# Patient Record
Sex: FEMALE | Race: BLACK OR AFRICAN AMERICAN | NOT HISPANIC OR LATINO | Employment: UNEMPLOYED | URBAN - METROPOLITAN AREA
[De-identification: names, ages, dates, MRNs, and addresses within clinical notes are randomized per-mention and may not be internally consistent; named-entity substitution may affect disease eponyms.]

---

## 2017-05-26 RX ORDER — HYDROXYCHLOROQUINE SULFATE 200 MG/1
200 TABLET, FILM COATED ORAL 2 TIMES DAILY
Qty: 60 TABLET | Refills: 11 | Status: SHIPPED | OUTPATIENT
Start: 2017-05-26 | End: 2017-12-04 | Stop reason: SDUPTHER

## 2017-09-06 DIAGNOSIS — M81.0 OSTEOPOROSIS, UNSPECIFIED OSTEOPOROSIS TYPE, UNSPECIFIED PATHOLOGICAL FRACTURE PRESENCE: ICD-10-CM

## 2017-09-06 DIAGNOSIS — M16.0 PRIMARY OSTEOARTHRITIS OF BOTH HIPS: ICD-10-CM

## 2017-09-06 DIAGNOSIS — M06.9 RHEUMATOID ARTHRITIS OF HAND, UNSPECIFIED LATERALITY, UNSPECIFIED RHEUMATOID FACTOR PRESENCE: ICD-10-CM

## 2017-09-06 RX ORDER — IBUPROFEN AND FAMOTIDINE 26.6; 8 MG/1; MG/1
1 TABLET ORAL 3 TIMES DAILY
Qty: 90 TABLET | Refills: 11 | Status: SHIPPED | OUTPATIENT
Start: 2017-09-06 | End: 2018-04-20 | Stop reason: SDUPTHER

## 2017-09-22 ENCOUNTER — OFFICE VISIT (OUTPATIENT)
Dept: RHEUMATOLOGY | Facility: CLINIC | Age: 62
End: 2017-09-22
Payer: COMMERCIAL

## 2017-09-22 VITALS
SYSTOLIC BLOOD PRESSURE: 115 MMHG | WEIGHT: 234.56 LBS | DIASTOLIC BLOOD PRESSURE: 70 MMHG | BODY MASS INDEX: 41.55 KG/M2 | HEART RATE: 69 BPM

## 2017-09-22 DIAGNOSIS — M16.0 PRIMARY OSTEOARTHRITIS OF BOTH HIPS: ICD-10-CM

## 2017-09-22 DIAGNOSIS — M81.0 OSTEOPOROSIS, UNSPECIFIED OSTEOPOROSIS TYPE, UNSPECIFIED PATHOLOGICAL FRACTURE PRESENCE: ICD-10-CM

## 2017-09-22 DIAGNOSIS — M06.9 RHEUMATOID ARTHRITIS FLARE: ICD-10-CM

## 2017-09-22 DIAGNOSIS — K21.00 GASTROESOPHAGEAL REFLUX DISEASE WITH ESOPHAGITIS: ICD-10-CM

## 2017-09-22 PROCEDURE — 99214 OFFICE O/P EST MOD 30 MIN: CPT | Mod: S$GLB,,, | Performed by: INTERNAL MEDICINE

## 2017-09-22 PROCEDURE — 3008F BODY MASS INDEX DOCD: CPT | Mod: S$GLB,,, | Performed by: INTERNAL MEDICINE

## 2017-09-22 PROCEDURE — 99999 PR PBB SHADOW E&M-EST. PATIENT-LVL III: CPT | Mod: PBBFAC,,, | Performed by: INTERNAL MEDICINE

## 2017-09-22 RX ORDER — CHOLECALCIFEROL (VITAMIN D3) 25 MCG
2000 TABLET ORAL DAILY
COMMUNITY

## 2017-09-22 RX ORDER — DEXLANSOPRAZOLE 60 MG/1
60 CAPSULE, DELAYED RELEASE ORAL DAILY
Qty: 30 CAPSULE | Refills: 11 | Status: SHIPPED | OUTPATIENT
Start: 2017-09-22 | End: 2018-04-20 | Stop reason: SDUPTHER

## 2017-09-22 RX ORDER — PREDNISONE 5 MG/1
TABLET ORAL
Qty: 90 TABLET | Refills: 6 | Status: SHIPPED | OUTPATIENT
Start: 2017-09-22 | End: 2018-04-20 | Stop reason: SDUPTHER

## 2017-09-22 RX ORDER — CLOTRIMAZOLE AND BETAMETHASONE DIPROPIONATE 10; .64 MG/G; MG/G
CREAM TOPICAL 2 TIMES DAILY
Qty: 45 G | Refills: 3 | Status: SHIPPED | OUTPATIENT
Start: 2017-09-22 | End: 2019-01-14 | Stop reason: SDUPTHER

## 2017-09-22 RX ORDER — AMLODIPINE BESYLATE 5 MG/1
5 TABLET ORAL DAILY
COMMUNITY
End: 2018-04-20 | Stop reason: SDUPTHER

## 2017-09-22 NOTE — PROGRESS NOTES
Subjective:       Patient ID: Sobeida Valencia is a 62 y.o. female.    Chief Complaint: Follow-up      Follow up   Rheumatoid arthritis   Patient complains of arthralgias and myalgias for which has been present for a few years. Pain is located in multiple joints, both shoulder(s), both elbow(s), both wrist(s), both MCP(s): 1st, 2nd, 3rd, 4th and 5th, both PIP(s): 1st, 2nd, 3rd, 4th and 5th, both DIP(s): 1st and 2nd, both hip(s), both knee(s) and both MTP(s): 1st, 2nd, 3rd, 4th and 5th, is described as aching, pulsating, shooting and throbbing, and is constant, moderate .  Associated symptoms include: crepitation, decreased range of motion, edema, effusion, tenderness and warmth.  The patient is on plaquenil but not taking it routinely and duxesis. .          Review of Systems   Constitutional: Positive for activity change. Negative for appetite change, chills, diaphoresis and unexpected weight change.   HENT: Negative for congestion, dental problem, ear discharge, ear pain, facial swelling, mouth sores, nosebleeds, postnasal drip, rhinorrhea, sinus pressure, sneezing, sore throat, tinnitus and voice change.    Eyes: Negative for photophobia, pain, discharge, redness and itching.   Respiratory: Negative for apnea, cough, chest tightness, shortness of breath and wheezing.    Cardiovascular: Positive for leg swelling. Negative for chest pain and palpitations.   Gastrointestinal: Negative for abdominal distention, abdominal pain, constipation, diarrhea, nausea and vomiting.   Endocrine: Negative for cold intolerance, heat intolerance, polydipsia and polyuria.   Genitourinary: Negative for decreased urine volume, difficulty urinating, flank pain, frequency, hematuria and urgency.   Musculoskeletal: Positive for arthralgias, back pain, gait problem, neck pain and neck stiffness.   Skin: Negative for pallor, rash and wound.   Allergic/Immunologic: Negative for immunocompromised state.   Neurological: Negative for dizziness,  tremors, weakness and numbness.   Hematological: Negative for adenopathy. Does not bruise/bleed easily.   Psychiatric/Behavioral: Negative for sleep disturbance. The patient is not nervous/anxious.          Objective:     /70 (BP Location: Left arm, Patient Position: Sitting, BP Method: Large (Automatic))   Pulse 69   Wt 106.4 kg (234 lb 9.1 oz)   BMI 41.55 kg/m²      Physical Exam   Nursing note and vitals reviewed.  Constitutional: She is oriented to person, place, and time and well-developed, well-nourished, and in no distress.   HENT:   Head: Normocephalic and atraumatic.   Mouth/Throat: Oropharynx is clear and moist.   Eyes: EOM are normal. Pupils are equal, round, and reactive to light.   Neck: Neck supple. No thyromegaly present.   Cardiovascular: Normal rate, regular rhythm and normal heart sounds.  Exam reveals no gallop and no friction rub.    No murmur heard.  Pulmonary/Chest: Breath sounds normal. She has no wheezes. She has no rales. She exhibits no tenderness.   Abdominal: There is no tenderness. There is no rebound and no guarding.       Right Side Rheumatological Exam     Examination finds the elbow normal.    The patient is tender to palpation of the 1st PIP, 1st MCP, 2nd PIP, 2nd MCP, 3rd PIP, 3rd MCP, 4th PIP, 4th MCP, 5th PIP and 5th MCP    She has swelling of the 1st PIP, 1st MCP, 2nd PIP, 2nd MCP, 3rd PIP, 3rd MCP, 4th PIP, 4th MCP, 5th PIP and 5th MCP    Shoulder Exam   Tenderness Location: no tenderness    Range of Motion   Active Abduction: abnormal   Adduction: abnormal  Sensation: normal    Knee Exam   Patellofemoral Crepitus: positive  Effusion: negative  Sensation: normal    Hip Exam   Tenderness Location: posterior  Sensation: normal    Elbow/Wrist Exam   Tenderness Location: no tenderness  Sensation: normal    Left Side Rheumatological Exam     Examination finds the elbow normal.    The patient is tender to palpation of the 1st PIP, 1st MCP, 2nd PIP, 2nd MCP, 3rd PIP, 3rd MCP,  4th PIP, 4th MCP, 5th PIP and 5th MCP.    She has swelling of the 1st PIP, 1st MCP, 2nd PIP, 2nd MCP, 3rd PIP, 3rd MCP, 4th PIP, 4th MCP, 5th PIP and 5th MCP    Shoulder Exam   Tenderness Location: no tenderness    Range of Motion   Active Abduction: abnormal   Sensation: normal    Knee Exam     Patellofemoral Crepitus: positive  Effusion: negative  Sensation: normal    Hip Exam   Tenderness Location: posterior  Sensation: normal    Elbow/Wrist Exam   Sensation: normal      Back/Neck Exam   General Inspection   Gait: normal         Lymphadenopathy:     She has no cervical adenopathy.   Neurological: She is alert and oriented to person, place, and time. Gait normal.   Skin: No rash noted. No erythema. No pallor.     Psychiatric: Mood and affect normal.   Musculoskeletal: She exhibits edema and tenderness.          Assessment:     Plan:     Subjective:         Review of Systems   Constitutional: Positive for fatigue. Negative for fever, chills, weight loss, diaphoresis, activity change, appetite change and unexpected weight change.   HENT: Negative for congestion, dental problem, ear discharge, ear pain, facial swelling, mouth sores, nosebleeds, postnasal drip, rhinorrhea, sinus pressure, sneezing, sore throat, tinnitus, trouble swallowing and voice change.    Eyes: Negative for photophobia, pain, discharge, redness and itching.   Respiratory: Negative for apnea, cough, chest tightness, shortness of breath and wheezing.    Cardiovascular: Positive for leg swelling. Negative for chest pain and palpitations.   Gastrointestinal: Negative for nausea, vomiting, abdominal pain, diarrhea, constipation and abdominal distention.   Endocrine: Negative for cold intolerance, heat intolerance, polydipsia and polyuria.   Genitourinary: Negative for dysuria, urgency, frequency, hematuria, flank pain, decreased urine volume and difficulty urinating.   Musculoskeletal: Positive for myalgias, back pain, joint swelling, arthralgias,  gait problem, stiffness, neck pain and neck stiffness.   Skin: Negative for pallor, rash and wound.   Allergic/Immunologic: Negative for immunocompromised state.   Neurological: Negative for dizziness, tremors, weakness, numbness and headaches.   Hematological: Negative for adenopathy. Does not bruise/bleed easily.   Psychiatric/Behavioral: Negative for sleep disturbance. The patient is not nervous/anxious.          Objective:     /70 (BP Location: Left arm, Patient Position: Sitting, BP Method: Large (Automatic))   Pulse 69   Wt 106.4 kg (234 lb 9.1 oz)   BMI 41.55 kg/m²      Physical Exam   Nursing note and vitals reviewed.  Constitutional: She is oriented to person, place, and time and well-developed, well-nourished, and in no distress.   HENT:   Head: Normocephalic and atraumatic.   Mouth/Throat: Oropharynx is clear and moist.   Eyes: EOM are normal. Pupils are equal, round, and reactive to light.   Neck: Neck supple. No thyromegaly present.   Cardiovascular: Normal rate, regular rhythm and normal heart sounds.  Exam reveals no gallop and no friction rub.    No murmur heard.  Pulmonary/Chest: Breath sounds normal. She has no wheezes. She has no rales. She exhibits no tenderness.   Abdominal: There is no tenderness. There is no rebound and no guarding.       Right Side Rheumatological Exam     Examination finds the elbow normal.    The patient is tender to palpation of the shoulder, wrist, knee, 1st PIP, 1st MCP, 2nd PIP, 2nd MCP, 3rd PIP, 3rd MCP, 4th PIP, 4th MCP, 5th PIP and 5th MCP    She has swelling of the 1st PIP, 1st MCP, 2nd PIP, 2nd MCP, 3rd PIP, 3rd MCP, 4th PIP, 4th MCP, 5th PIP and 5th MCP    Shoulder Exam   Tenderness Location: no tenderness    Range of Motion   Active Abduction: abnormal   Adduction: abnormal  Effusion: negative  Sensation: normal    Knee Exam   Patellofemoral Crepitus: positive  Effusion: negative  Range of Motion: normal range of motion  Sensation: normal    Hip Exam    Tenderness Location: posterior  Sensation: normal    Elbow/Wrist Exam   Tenderness Location: no tenderness  Sensation: normal    Left Side Rheumatological Exam     Examination finds the elbow normal.    The patient is tender to palpation of the shoulder, wrist, knee, 1st PIP, 1st MCP, 2nd PIP, 2nd MCP, 3rd PIP, 3rd MCP, 4th PIP, 4th MCP, 5th PIP and 5th MCP.    She has swelling of the 1st PIP, 1st MCP, 2nd PIP, 2nd MCP, 3rd PIP, 3rd MCP, 4th PIP, 4th MCP, 5th PIP and 5th MCP    Shoulder Exam   Tenderness Location: no tenderness    Range of Motion   Active Abduction: abnormal   Sensation: normal    Knee Exam     Patellofemoral Crepitus: positive  Effusion: negative  Range of Motion: decreased range of motion  Sensation: normal    Hip Exam   Tenderness Location: posterior  Sensation: normal    Elbow/Wrist Exam   Sensation: normal      Back/Neck Exam   General Inspection   Gait: normal         Lymphadenopathy:     She has no cervical adenopathy.   Neurological: She is alert and oriented to person, place, and time. Gait normal.   Skin: No rash noted. No erythema. No pallor.     Psychiatric: Mood and affect normal.      ra 12 esr 25   Assessment:       Encounter Diagnoses   Name Primary?    Primary osteoarthritis of both hips     Osteoporosis, unspecified osteoporosis type, unspecified pathological fracture presence     Gastroesophageal reflux disease with esophagitis     Rheumatoid arthritis flare          Plan:     Sobeida was seen today for follow-up.    Diagnoses and all orders for this visit:    Primary osteoarthritis of both hips  -     clotrimazole-betamethasone 1-0.05% (LOTRISONE) cream; Apply topically 2 (two) times daily.  -     dexlansoprazole (DEXILANT) 60 mg capsule; Take 1 capsule (60 mg total) by mouth once daily.  -     predniSONE (DELTASONE) 5 MG tablet; 1 tab to 3 tab po in prn swelling  -     X-Ray Hand Complete Bilateral; Future  -     DEBO; Future  -     Sjogrens syndrome-A extractable  nuclear antibody; Future  -     Sjogrens syndrome-B extractable nuclear antibody; Future  -     TSH; Future  -     T4, free; Future  -     T3, free; Future  -     CBC auto differential; Future  -     Comprehensive metabolic panel; Future  -     Sedimentation rate, manual; Future  -     Rheumatoid factor; Future  -     C-reactive protein; Future  -     Cyclic citrul peptide antibody, IgG; Future  -     X-Ray Hand Complete Bilateral  -     DEBO  -     Sjogrens syndrome-A extractable nuclear antibody  -     Sjogrens syndrome-B extractable nuclear antibody  -     TSH  -     T4, free  -     T3, free  -     CBC auto differential  -     Comprehensive metabolic panel  -     Sedimentation rate, manual  -     Rheumatoid factor  -     C-reactive protein  -     Cyclic citrul peptide antibody, IgG    Osteoporosis, unspecified osteoporosis type, unspecified pathological fracture presence  -     clotrimazole-betamethasone 1-0.05% (LOTRISONE) cream; Apply topically 2 (two) times daily.  -     dexlansoprazole (DEXILANT) 60 mg capsule; Take 1 capsule (60 mg total) by mouth once daily.  -     predniSONE (DELTASONE) 5 MG tablet; 1 tab to 3 tab po in prn swelling  -     X-Ray Hand Complete Bilateral; Future  -     DEBO; Future  -     Sjogrens syndrome-A extractable nuclear antibody; Future  -     Sjogrens syndrome-B extractable nuclear antibody; Future  -     TSH; Future  -     T4, free; Future  -     T3, free; Future  -     CBC auto differential; Future  -     Comprehensive metabolic panel; Future  -     Sedimentation rate, manual; Future  -     Rheumatoid factor; Future  -     C-reactive protein; Future  -     Cyclic citrul peptide antibody, IgG; Future  -     X-Ray Hand Complete Bilateral  -     DEBO  -     Sjogrens syndrome-A extractable nuclear antibody  -     Sjogrens syndrome-B extractable nuclear antibody  -     TSH  -     T4, free  -     T3, free  -     CBC auto differential  -     Comprehensive metabolic panel  -      Sedimentation rate, manual  -     Rheumatoid factor  -     C-reactive protein  -     Cyclic citrul peptide antibody, IgG    Gastroesophageal reflux disease with esophagitis  -     clotrimazole-betamethasone 1-0.05% (LOTRISONE) cream; Apply topically 2 (two) times daily.  -     dexlansoprazole (DEXILANT) 60 mg capsule; Take 1 capsule (60 mg total) by mouth once daily.  -     predniSONE (DELTASONE) 5 MG tablet; 1 tab to 3 tab po in prn swelling  -     X-Ray Hand Complete Bilateral; Future  -     DEBO; Future  -     Sjogrens syndrome-A extractable nuclear antibody; Future  -     Sjogrens syndrome-B extractable nuclear antibody; Future  -     TSH; Future  -     T4, free; Future  -     T3, free; Future  -     CBC auto differential; Future  -     Comprehensive metabolic panel; Future  -     Sedimentation rate, manual; Future  -     Rheumatoid factor; Future  -     C-reactive protein; Future  -     Cyclic citrul peptide antibody, IgG; Future  -     X-Ray Hand Complete Bilateral  -     DEBO  -     Sjogrens syndrome-A extractable nuclear antibody  -     Sjogrens syndrome-B extractable nuclear antibody  -     TSH  -     T4, free  -     T3, free  -     CBC auto differential  -     Comprehensive metabolic panel  -     Sedimentation rate, manual  -     Rheumatoid factor  -     C-reactive protein  -     Cyclic citrul peptide antibody, IgG    Rheumatoid arthritis flare  -     clotrimazole-betamethasone 1-0.05% (LOTRISONE) cream; Apply topically 2 (two) times daily.  -     dexlansoprazole (DEXILANT) 60 mg capsule; Take 1 capsule (60 mg total) by mouth once daily.  -     predniSONE (DELTASONE) 5 MG tablet; 1 tab to 3 tab po in prn swelling  -     X-Ray Hand Complete Bilateral; Future  -     DEBO; Future  -     Sjogrens syndrome-A extractable nuclear antibody; Future  -     Sjogrens syndrome-B extractable nuclear antibody; Future  -     TSH; Future  -     T4, free; Future  -     T3, free; Future  -     CBC auto differential; Future  -      Comprehensive metabolic panel; Future  -     Sedimentation rate, manual; Future  -     Rheumatoid factor; Future  -     C-reactive protein; Future  -     Cyclic citrul peptide antibody, IgG; Future  -     X-Ray Hand Complete Bilateral  -     DEBO  -     Sjogrens syndrome-A extractable nuclear antibody  -     Sjogrens syndrome-B extractable nuclear antibody  -     TSH  -     T4, free  -     T3, free  -     CBC auto differential  -     Comprehensive metabolic panel  -     Sedimentation rate, manual  -     Rheumatoid factor  -     C-reactive protein  -     Cyclic citrul peptide antibody, IgG    Plaquenil for ra slow progression   prednisone only when bad

## 2017-11-14 ENCOUNTER — HISTORICAL (OUTPATIENT)
Dept: ENDOSCOPY | Facility: HOSPITAL | Age: 62
End: 2017-11-14

## 2017-12-05 RX ORDER — HYDROXYCHLOROQUINE SULFATE 200 MG/1
200 TABLET, FILM COATED ORAL 2 TIMES DAILY
Qty: 60 TABLET | Refills: 11 | Status: SHIPPED | OUTPATIENT
Start: 2017-12-05 | End: 2018-04-20 | Stop reason: SDUPTHER

## 2018-04-19 LAB
ALBUMIN SERPL-MCNC: 4 G/DL (ref 3.6–4.8)
ALBUMIN/GLOB SERPL: 1.4 {RATIO} (ref 1.2–2.2)
ALP SERPL-CCNC: 173 IU/L (ref 39–117)
ALT SERPL-CCNC: 13 IU/L (ref 0–32)
ANA TITR SER IF: NEGATIVE {TITER}
AST SERPL-CCNC: 16 IU/L (ref 0–40)
BASOPHILS # BLD AUTO: 0.1 X10E3/UL (ref 0–0.2)
BASOPHILS NFR BLD AUTO: 1 %
BILIRUB SERPL-MCNC: 0.3 MG/DL (ref 0–1.2)
BUN SERPL-MCNC: 14 MG/DL (ref 8–27)
BUN/CREAT SERPL: 18 (ref 12–28)
CALCIUM SERPL-MCNC: 9.4 MG/DL (ref 8.7–10.3)
CCP IGA+IGG SERPL IA-ACNC: 6 UNITS (ref 0–19)
CHLORIDE SERPL-SCNC: 104 MMOL/L (ref 96–106)
CO2 SERPL-SCNC: 24 MMOL/L (ref 18–29)
CREAT SERPL-MCNC: 0.8 MG/DL (ref 0.57–1)
CRP SERPL-MCNC: 9.4 MG/L (ref 0–4.9)
ENA SS-A AB SER-ACNC: 0.2 AI (ref 0–0.9)
ENA SS-B AB SER-ACNC: <0.2 AI (ref 0–0.9)
EOSINOPHIL # BLD AUTO: 0.6 X10E3/UL (ref 0–0.4)
EOSINOPHIL NFR BLD AUTO: 6 %
ERYTHROCYTE [DISTWIDTH] IN BLOOD BY AUTOMATED COUNT: 14.3 % (ref 12.3–15.4)
ERYTHROCYTE [SEDIMENTATION RATE] IN BLOOD BY WESTERGREN METHOD: 27 MM/HR (ref 0–40)
GFR SERPLBLD CREATININE-BSD FMLA CKD-EPI: 79 ML/MIN/1.73
GFR SERPLBLD CREATININE-BSD FMLA CKD-EPI: 91 ML/MIN/1.73
GLOBULIN SER CALC-MCNC: 2.8 G/DL (ref 1.5–4.5)
GLUCOSE SERPL-MCNC: 92 MG/DL (ref 65–99)
HCT VFR BLD AUTO: 42.1 % (ref 34–46.6)
HGB BLD-MCNC: 13.2 G/DL (ref 11.1–15.9)
LYMPHOCYTES # BLD AUTO: 2 X10E3/UL (ref 0.7–3.1)
LYMPHOCYTES NFR BLD AUTO: 20 %
MCH RBC QN AUTO: 29.6 PG (ref 26.6–33)
MCHC RBC AUTO-ENTMCNC: 31.5 G/DL (ref 31.5–35.7)
MCV RBC AUTO: 94 FL (ref 79–97)
MONOCYTES # BLD AUTO: 0.7 X10E3/UL (ref 0.1–0.9)
MONOCYTES NFR BLD AUTO: 8 %
NEUTROPHILS # BLD AUTO: 6.1 X10E3/UL (ref 1.4–7)
NEUTROPHILS NFR BLD AUTO: 65 %
PLATELET # BLD AUTO: 254 X10E3/UL (ref 150–379)
POTASSIUM SERPL-SCNC: 3.8 MMOL/L (ref 3.5–5.2)
PROT SERPL-MCNC: 6.8 G/DL (ref 6–8.5)
RBC # BLD AUTO: 4.47 X10E6/UL (ref 3.77–5.28)
RHEUMATOID FACT SERPL-ACNC: 14.1 IU/ML (ref 0–13.9)
SODIUM SERPL-SCNC: 142 MMOL/L (ref 134–144)
T3FREE SERPL-MCNC: 3.2 PG/ML (ref 2–4.4)
T4 FREE SERPL-MCNC: 1.22 NG/DL (ref 0.82–1.77)
TSH SERPL DL<=0.005 MIU/L-ACNC: 2.07 UIU/ML (ref 0.45–4.5)
WBC # BLD AUTO: 9.7 X10E3/UL (ref 3.4–10.8)

## 2018-04-20 ENCOUNTER — OFFICE VISIT (OUTPATIENT)
Dept: RHEUMATOLOGY | Facility: CLINIC | Age: 63
End: 2018-04-20
Payer: COMMERCIAL

## 2018-04-20 VITALS
HEIGHT: 63 IN | DIASTOLIC BLOOD PRESSURE: 71 MMHG | BODY MASS INDEX: 41.21 KG/M2 | WEIGHT: 232.56 LBS | SYSTOLIC BLOOD PRESSURE: 141 MMHG | HEART RATE: 70 BPM

## 2018-04-20 DIAGNOSIS — M06.9 RHEUMATOID ARTHRITIS FLARE: ICD-10-CM

## 2018-04-20 DIAGNOSIS — K21.00 GASTROESOPHAGEAL REFLUX DISEASE WITH ESOPHAGITIS: ICD-10-CM

## 2018-04-20 DIAGNOSIS — M16.0 PRIMARY OSTEOARTHRITIS OF BOTH HIPS: ICD-10-CM

## 2018-04-20 DIAGNOSIS — M81.0 OSTEOPOROSIS, UNSPECIFIED OSTEOPOROSIS TYPE, UNSPECIFIED PATHOLOGICAL FRACTURE PRESENCE: ICD-10-CM

## 2018-04-20 DIAGNOSIS — M06.9 RHEUMATOID ARTHRITIS OF HAND, UNSPECIFIED LATERALITY, UNSPECIFIED RHEUMATOID FACTOR PRESENCE: ICD-10-CM

## 2018-04-20 PROCEDURE — 99999 PR PBB SHADOW E&M-EST. PATIENT-LVL III: CPT | Mod: PBBFAC,,, | Performed by: INTERNAL MEDICINE

## 2018-04-20 PROCEDURE — 99214 OFFICE O/P EST MOD 30 MIN: CPT | Mod: S$GLB,,, | Performed by: INTERNAL MEDICINE

## 2018-04-20 RX ORDER — AMLODIPINE BESYLATE 5 MG/1
5 TABLET ORAL DAILY
Qty: 30 TABLET | Refills: 11 | Status: SHIPPED | OUTPATIENT
Start: 2018-04-20 | End: 2019-07-08 | Stop reason: SDUPTHER

## 2018-04-20 RX ORDER — PREDNISONE 5 MG/1
TABLET ORAL
Qty: 90 TABLET | Refills: 6 | Status: SHIPPED | OUTPATIENT
Start: 2018-04-20 | End: 2018-10-26 | Stop reason: SDUPTHER

## 2018-04-20 RX ORDER — DEXLANSOPRAZOLE 60 MG/1
60 CAPSULE, DELAYED RELEASE ORAL DAILY
Qty: 30 CAPSULE | Refills: 11 | Status: SHIPPED | OUTPATIENT
Start: 2018-04-20 | End: 2019-07-08 | Stop reason: SDUPTHER

## 2018-04-20 RX ORDER — IBUPROFEN AND FAMOTIDINE 26.6; 8 MG/1; MG/1
1 TABLET ORAL 3 TIMES DAILY
Qty: 90 TABLET | Refills: 11 | Status: SHIPPED | OUTPATIENT
Start: 2018-04-20 | End: 2018-07-13 | Stop reason: SDUPTHER

## 2018-04-20 RX ORDER — HYDROXYCHLOROQUINE SULFATE 200 MG/1
200 TABLET, FILM COATED ORAL 2 TIMES DAILY
Qty: 60 TABLET | Refills: 11 | Status: SHIPPED | OUTPATIENT
Start: 2018-04-20 | End: 2018-10-26 | Stop reason: SDUPTHER

## 2018-04-20 ASSESSMENT — ROUTINE ASSESSMENT OF PATIENT INDEX DATA (RAPID3)
MDHAQ FUNCTION SCORE: 1
TOTAL RAPID3 SCORE: 4.44
PSYCHOLOGICAL DISTRESS SCORE: 2.2
PATIENT GLOBAL ASSESSMENT SCORE: 5
AM STIFFNESS SCORE: 1, YES
PAIN SCORE: 5
FATIGUE SCORE: 4

## 2018-04-20 NOTE — PROGRESS NOTES
Subjective:       Patient ID: Sobeida Valencia is a 63 y.o. female.    Chief Complaint: Disease Management (6 mth f/u for OA/ RA) and Pain      Follow up   Rheumatoid arthritis   Patient complains of arthralgias and myalgias for which has been present for a few years. Pain is located in multiple joints, both shoulder(s), both elbow(s), both wrist(s), both MCP(s): 1st, 2nd, 3rd, 4th and 5th, both PIP(s): 1st, 2nd, 3rd, 4th and 5th, both DIP(s): 1st and 2nd, both hip(s), both knee(s) and both MTP(s): 1st, 2nd, 3rd, 4th and 5th, is described as aching, pulsating, shooting and throbbing, and is constant, moderate .  Associated symptoms include: crepitation, decreased range of motion, edema, effusion, tenderness and warmth.  The patient is on plaquenil but not taking it routinely and duxesis. .          Review of Systems   Constitutional: Positive for activity change. Negative for appetite change, chills, diaphoresis and unexpected weight change.   HENT: Negative for congestion, dental problem, ear discharge, ear pain, facial swelling, mouth sores, nosebleeds, postnasal drip, rhinorrhea, sinus pressure, sneezing, sore throat, tinnitus and voice change.    Eyes: Negative for photophobia, pain, discharge, redness and itching.   Respiratory: Negative for apnea, cough, chest tightness, shortness of breath and wheezing.    Cardiovascular: Positive for leg swelling. Negative for chest pain and palpitations.   Gastrointestinal: Negative for abdominal distention, abdominal pain, constipation, diarrhea, nausea and vomiting.   Endocrine: Negative for cold intolerance, heat intolerance, polydipsia and polyuria.   Genitourinary: Negative for decreased urine volume, difficulty urinating, flank pain, frequency, hematuria and urgency.   Musculoskeletal: Positive for arthralgias, back pain, gait problem, neck pain and neck stiffness.   Skin: Negative for pallor, rash and wound.   Allergic/Immunologic: Negative for immunocompromised  "state.   Neurological: Negative for dizziness, tremors, weakness and numbness.   Hematological: Negative for adenopathy. Does not bruise/bleed easily.   Psychiatric/Behavioral: Negative for sleep disturbance. The patient is not nervous/anxious.          Objective:     BP (!) 141/71 (BP Location: Left leg, Patient Position: Sitting, BP Method: Large (Automatic))   Pulse 70   Ht 5' 3" (1.6 m)   Wt 105.5 kg (232 lb 9.4 oz)   BMI 41.20 kg/m²      Physical Exam   Nursing note and vitals reviewed.  Constitutional: She is oriented to person, place, and time and well-developed, well-nourished, and in no distress.   HENT:   Head: Normocephalic and atraumatic.   Mouth/Throat: Oropharynx is clear and moist.   Eyes: EOM are normal. Pupils are equal, round, and reactive to light.   Neck: Neck supple. No thyromegaly present.   Cardiovascular: Normal rate, regular rhythm and normal heart sounds.  Exam reveals no gallop and no friction rub.    No murmur heard.  Pulmonary/Chest: Breath sounds normal. She has no wheezes. She has no rales. She exhibits no tenderness.   Abdominal: There is no tenderness. There is no rebound and no guarding.       Right Side Rheumatological Exam     Examination finds the elbow normal.    The patient is tender to palpation of the 1st PIP, 1st MCP, 2nd PIP, 2nd MCP, 3rd PIP, 3rd MCP, 4th PIP, 4th MCP, 5th PIP and 5th MCP    She has swelling of the 1st PIP, 1st MCP, 2nd PIP, 2nd MCP, 3rd PIP, 3rd MCP, 4th PIP, 4th MCP, 5th PIP and 5th MCP    Shoulder Exam   Tenderness Location: no tenderness    Range of Motion   Active Abduction: abnormal   Adduction: abnormal  Sensation: normal    Knee Exam   Patellofemoral Crepitus: positive  Effusion: negative  Sensation: normal    Hip Exam   Tenderness Location: posterior  Sensation: normal    Elbow/Wrist Exam   Tenderness Location: no tenderness  Sensation: normal    Left Side Rheumatological Exam     Examination finds the elbow normal.    The patient is tender to " palpation of the 1st PIP, 1st MCP, 2nd PIP, 2nd MCP, 3rd PIP, 3rd MCP, 4th PIP, 4th MCP, 5th PIP and 5th MCP.    She has swelling of the 1st PIP, 1st MCP, 2nd PIP, 2nd MCP, 3rd PIP, 3rd MCP, 4th PIP, 4th MCP, 5th PIP and 5th MCP    Shoulder Exam   Tenderness Location: no tenderness    Range of Motion   Active Abduction: abnormal   Sensation: normal    Knee Exam     Patellofemoral Crepitus: positive  Effusion: negative  Sensation: normal    Hip Exam   Tenderness Location: posterior  Sensation: normal    Elbow/Wrist Exam   Sensation: normal      Back/Neck Exam   General Inspection   Gait: normal         Lymphadenopathy:     She has no cervical adenopathy.   Neurological: She is alert and oriented to person, place, and time. Gait normal.   Skin: No rash noted. No erythema. No pallor.     Psychiatric: Mood and affect normal.   Musculoskeletal: She exhibits edema and tenderness.                  Assessment:       Encounter Diagnoses   Name Primary?    Rheumatoid arthritis flare     Primary osteoarthritis of both hips     Osteoporosis, unspecified osteoporosis type, unspecified pathological fracture presence     Gastroesophageal reflux disease with esophagitis     Rheumatoid arthritis of hand, unspecified laterality, unspecified rheumatoid factor presence          Plan:     Sobeida was seen today for disease management and pain.    Diagnoses and all orders for this visit:    Rheumatoid arthritis flare  -     predniSONE (DELTASONE) 5 MG tablet; 1 tab to 3 tab po in prn swelling  -     dexlansoprazole (DEXILANT) 60 mg capsule; Take 1 capsule (60 mg total) by mouth once daily.  -     CBC auto differential; Future  -     Comprehensive metabolic panel; Future  -     C-reactive protein; Future  -     Sedimentation rate, manual; Future  -     Rheumatoid factor; Future  -     Cyclic citrul peptide antibody, IgG; Future  -     Vitamin D; Future  -     TSH; Future  -     CBC auto differential  -     Comprehensive metabolic  panel  -     C-reactive protein  -     Sedimentation rate, manual  -     Rheumatoid factor  -     Cyclic citrul peptide antibody, IgG  -     Vitamin D  -     TSH    Primary osteoarthritis of both hips  -     predniSONE (DELTASONE) 5 MG tablet; 1 tab to 3 tab po in prn swelling  -     ibuprofen-famotidine (DUEXIS) 800-26.6 mg Tab; Take 1 tablet by mouth 3 (three) times daily.  -     dexlansoprazole (DEXILANT) 60 mg capsule; Take 1 capsule (60 mg total) by mouth once daily.  -     CBC auto differential; Future  -     Comprehensive metabolic panel; Future  -     C-reactive protein; Future  -     Sedimentation rate, manual; Future  -     Rheumatoid factor; Future  -     Cyclic citrul peptide antibody, IgG; Future  -     Vitamin D; Future  -     TSH; Future  -     CBC auto differential  -     Comprehensive metabolic panel  -     C-reactive protein  -     Sedimentation rate, manual  -     Rheumatoid factor  -     Cyclic citrul peptide antibody, IgG  -     Vitamin D  -     TSH    Osteoporosis, unspecified osteoporosis type, unspecified pathological fracture presence  -     predniSONE (DELTASONE) 5 MG tablet; 1 tab to 3 tab po in prn swelling  -     ibuprofen-famotidine (DUEXIS) 800-26.6 mg Tab; Take 1 tablet by mouth 3 (three) times daily.  -     dexlansoprazole (DEXILANT) 60 mg capsule; Take 1 capsule (60 mg total) by mouth once daily.  -     CBC auto differential; Future  -     Comprehensive metabolic panel; Future  -     C-reactive protein; Future  -     Sedimentation rate, manual; Future  -     Rheumatoid factor; Future  -     Cyclic citrul peptide antibody, IgG; Future  -     Vitamin D; Future  -     TSH; Future  -     CBC auto differential  -     Comprehensive metabolic panel  -     C-reactive protein  -     Sedimentation rate, manual  -     Rheumatoid factor  -     Cyclic citrul peptide antibody, IgG  -     Vitamin D  -     TSH    Gastroesophageal reflux disease with esophagitis  -     predniSONE (DELTASONE) 5 MG  tablet; 1 tab to 3 tab po in prn swelling  -     dexlansoprazole (DEXILANT) 60 mg capsule; Take 1 capsule (60 mg total) by mouth once daily.  -     CBC auto differential; Future  -     Comprehensive metabolic panel; Future  -     C-reactive protein; Future  -     Sedimentation rate, manual; Future  -     Rheumatoid factor; Future  -     Cyclic citrul peptide antibody, IgG; Future  -     Vitamin D; Future  -     TSH; Future  -     CBC auto differential  -     Comprehensive metabolic panel  -     C-reactive protein  -     Sedimentation rate, manual  -     Rheumatoid factor  -     Cyclic citrul peptide antibody, IgG  -     Vitamin D  -     TSH    Rheumatoid arthritis of hand, unspecified laterality, unspecified rheumatoid factor presence  -     ibuprofen-famotidine (DUEXIS) 800-26.6 mg Tab; Take 1 tablet by mouth 3 (three) times daily.  -     CBC auto differential; Future  -     Comprehensive metabolic panel; Future  -     C-reactive protein; Future  -     Sedimentation rate, manual; Future  -     Rheumatoid factor; Future  -     Cyclic citrul peptide antibody, IgG; Future  -     Vitamin D; Future  -     TSH; Future  -     CBC auto differential  -     Comprehensive metabolic panel  -     C-reactive protein  -     Sedimentation rate, manual  -     Rheumatoid factor  -     Cyclic citrul peptide antibody, IgG  -     Vitamin D  -     TSH    Other orders  -     hydroxychloroquine (PLAQUENIL) 200 mg tablet; Take 1 tablet (200 mg total) by mouth 2 (two) times daily.  -     amLODIPine (NORVASC) 5 MG tablet; Take 1 tablet (5 mg total) by mouth once daily.    Plaquenil for ra slow progression   prednisone only when bad

## 2018-05-03 ENCOUNTER — TELEPHONE (OUTPATIENT)
Dept: RHEUMATOLOGY | Facility: CLINIC | Age: 63
End: 2018-05-03

## 2018-05-03 NOTE — TELEPHONE ENCOUNTER
----- Message from Clinton Pulido MD sent at 4/29/2018  7:12 PM CDT -----  Your results are essential normal. Except RA factor and crp are mildly elevated. Please inflrm pt and fax results to her PCP in [place]

## 2018-05-03 NOTE — TELEPHONE ENCOUNTER
Spoke to pt about results. She inquires what else can she do to manage her RA and inflammation? She reports taking the plaquenil as directed, duexis 3 times daily, and prednisone 10mg daily.

## 2018-07-13 DIAGNOSIS — M16.0 PRIMARY OSTEOARTHRITIS OF BOTH HIPS: ICD-10-CM

## 2018-07-13 DIAGNOSIS — M81.0 OSTEOPOROSIS, UNSPECIFIED OSTEOPOROSIS TYPE, UNSPECIFIED PATHOLOGICAL FRACTURE PRESENCE: ICD-10-CM

## 2018-07-13 DIAGNOSIS — M06.9 RHEUMATOID ARTHRITIS OF HAND, UNSPECIFIED LATERALITY, UNSPECIFIED RHEUMATOID FACTOR PRESENCE: ICD-10-CM

## 2018-07-19 RX ORDER — IBUPROFEN AND FAMOTIDINE 26.6; 8 MG/1; MG/1
1 TABLET ORAL 3 TIMES DAILY
Qty: 90 TABLET | Refills: 11 | Status: SHIPPED | OUTPATIENT
Start: 2018-07-19 | End: 2019-07-08 | Stop reason: SDUPTHER

## 2018-10-26 ENCOUNTER — TELEPHONE (OUTPATIENT)
Dept: RHEUMATOLOGY | Facility: CLINIC | Age: 63
End: 2018-10-26

## 2018-10-26 DIAGNOSIS — M16.0 PRIMARY OSTEOARTHRITIS OF BOTH HIPS: ICD-10-CM

## 2018-10-26 DIAGNOSIS — K21.00 GASTROESOPHAGEAL REFLUX DISEASE WITH ESOPHAGITIS: ICD-10-CM

## 2018-10-26 DIAGNOSIS — M06.9 RHEUMATOID ARTHRITIS FLARE: ICD-10-CM

## 2018-10-26 DIAGNOSIS — M81.0 OSTEOPOROSIS, UNSPECIFIED OSTEOPOROSIS TYPE, UNSPECIFIED PATHOLOGICAL FRACTURE PRESENCE: ICD-10-CM

## 2018-10-26 RX ORDER — PREDNISONE 5 MG/1
TABLET ORAL
Qty: 90 TABLET | Refills: 6 | Status: SHIPPED | OUTPATIENT
Start: 2018-10-26 | End: 2019-07-08 | Stop reason: SDUPTHER

## 2018-10-26 RX ORDER — HYDROXYCHLOROQUINE SULFATE 200 MG/1
200 TABLET, FILM COATED ORAL 2 TIMES DAILY
Qty: 60 TABLET | Refills: 11 | Status: SHIPPED | OUTPATIENT
Start: 2018-10-26 | End: 2019-07-08 | Stop reason: SDUPTHER

## 2018-10-26 NOTE — TELEPHONE ENCOUNTER
----- Message from Ed De Jesus sent at 10/26/2018  3:53 PM CDT -----  Type:  Pharmacy Calling to Clarify an RX    Name of Caller:  Ida  Pharmacy Name:    [facility] [place] [id number] [facility]  [address] [place] [address]  [place] [address]  Phone: [phone] Fax: [phone]    Prescription Name:  predniSONE (DELTASONE) 5 MG tablet       What do they need to clarify?:  Directions, 1-3 by mouth or 1, 3XDay  Best Call Back Number:  [phone]   Additional Information:  Please call to advise

## 2019-01-14 DIAGNOSIS — M81.0 OSTEOPOROSIS, UNSPECIFIED OSTEOPOROSIS TYPE, UNSPECIFIED PATHOLOGICAL FRACTURE PRESENCE: ICD-10-CM

## 2019-01-14 DIAGNOSIS — M06.9 RHEUMATOID ARTHRITIS FLARE: ICD-10-CM

## 2019-01-14 DIAGNOSIS — M16.0 PRIMARY OSTEOARTHRITIS OF BOTH HIPS: ICD-10-CM

## 2019-01-14 DIAGNOSIS — K21.00 GASTROESOPHAGEAL REFLUX DISEASE WITH ESOPHAGITIS: ICD-10-CM

## 2019-01-14 DIAGNOSIS — R21 RASH: Primary | ICD-10-CM

## 2019-01-15 RX ORDER — CLOTRIMAZOLE AND BETAMETHASONE DIPROPIONATE 10; .64 MG/G; MG/G
CREAM TOPICAL 2 TIMES DAILY
Qty: 45 G | Refills: 3 | Status: SHIPPED | OUTPATIENT
Start: 2019-01-15 | End: 2019-07-18 | Stop reason: SDUPTHER

## 2019-07-02 ENCOUNTER — TELEPHONE (OUTPATIENT)
Dept: RHEUMATOLOGY | Facility: CLINIC | Age: 64
End: 2019-07-02

## 2019-07-02 NOTE — TELEPHONE ENCOUNTER
----- Message from Sarah Moralez sent at 7/2/2019  9:11 AM CDT -----   Type:  Sooner Apoointment Request    Caller is requesting a sooner appointment.  Caller declined first available appointment listed below.  Caller will not accept being placed on the waitlist and is requesting a message be sent to doctor.    Name of Caller:  pt  When is the first available appointment?   Several  Months  out  Symptoms:   rhemu arthritis   Best Call Back Number:  [phone]  Additional Information:   Pt  Is asking to  Be  Fitted in around the third   Week of this month // please call  For all details

## 2019-07-02 NOTE — TELEPHONE ENCOUNTER
Contacted patient and informed there is appointment available Monday the 8th at 1130 am to see Dr Pulido. Patient stated she could make that appointment. Appointment scheduled

## 2019-07-08 ENCOUNTER — OFFICE VISIT (OUTPATIENT)
Dept: RHEUMATOLOGY | Facility: CLINIC | Age: 64
End: 2019-07-08
Payer: COMMERCIAL

## 2019-07-08 VITALS
BODY MASS INDEX: 36.32 KG/M2 | HEART RATE: 59 BPM | SYSTOLIC BLOOD PRESSURE: 146 MMHG | WEIGHT: 205 LBS | HEIGHT: 63 IN | DIASTOLIC BLOOD PRESSURE: 86 MMHG

## 2019-07-08 DIAGNOSIS — M81.0 OSTEOPOROSIS, UNSPECIFIED OSTEOPOROSIS TYPE, UNSPECIFIED PATHOLOGICAL FRACTURE PRESENCE: ICD-10-CM

## 2019-07-08 DIAGNOSIS — M16.0 PRIMARY OSTEOARTHRITIS OF BOTH HIPS: ICD-10-CM

## 2019-07-08 DIAGNOSIS — M06.9 RHEUMATOID ARTHRITIS FLARE: ICD-10-CM

## 2019-07-08 DIAGNOSIS — M06.9 RHEUMATOID ARTHRITIS OF HAND, UNSPECIFIED LATERALITY, UNSPECIFIED RHEUMATOID FACTOR PRESENCE: ICD-10-CM

## 2019-07-08 DIAGNOSIS — K21.00 GASTROESOPHAGEAL REFLUX DISEASE WITH ESOPHAGITIS: ICD-10-CM

## 2019-07-08 PROCEDURE — 3008F BODY MASS INDEX DOCD: CPT | Mod: CPTII,S$GLB,, | Performed by: INTERNAL MEDICINE

## 2019-07-08 PROCEDURE — 99999 PR PBB SHADOW E&M-EST. PATIENT-LVL III: CPT | Mod: PBBFAC,,, | Performed by: INTERNAL MEDICINE

## 2019-07-08 PROCEDURE — 99999 PR PBB SHADOW E&M-EST. PATIENT-LVL III: ICD-10-PCS | Mod: PBBFAC,,, | Performed by: INTERNAL MEDICINE

## 2019-07-08 PROCEDURE — 3008F PR BODY MASS INDEX (BMI) DOCUMENTED: ICD-10-PCS | Mod: CPTII,S$GLB,, | Performed by: INTERNAL MEDICINE

## 2019-07-08 PROCEDURE — 99214 PR OFFICE/OUTPT VISIT, EST, LEVL IV, 30-39 MIN: ICD-10-PCS | Mod: S$GLB,,, | Performed by: INTERNAL MEDICINE

## 2019-07-08 PROCEDURE — 99214 OFFICE O/P EST MOD 30 MIN: CPT | Mod: S$GLB,,, | Performed by: INTERNAL MEDICINE

## 2019-07-08 RX ORDER — DEXLANSOPRAZOLE 60 MG/1
60 CAPSULE, DELAYED RELEASE ORAL DAILY
Qty: 30 CAPSULE | Refills: 11 | Status: SHIPPED | OUTPATIENT
Start: 2019-07-08 | End: 2019-12-31 | Stop reason: SDUPTHER

## 2019-07-08 RX ORDER — AZELASTINE 1 MG/ML
SPRAY, METERED NASAL
COMMUNITY
Start: 2019-05-01

## 2019-07-08 RX ORDER — HYDROXYCHLOROQUINE SULFATE 200 MG/1
200 TABLET, FILM COATED ORAL 2 TIMES DAILY
Qty: 60 TABLET | Refills: 11 | Status: SHIPPED | OUTPATIENT
Start: 2019-07-08 | End: 2019-12-31 | Stop reason: SDUPTHER

## 2019-07-08 RX ORDER — IBUPROFEN AND FAMOTIDINE 26.6; 8 MG/1; MG/1
1 TABLET ORAL 3 TIMES DAILY
Qty: 90 TABLET | Refills: 11 | Status: SHIPPED | OUTPATIENT
Start: 2019-07-08 | End: 2019-09-04 | Stop reason: SDUPTHER

## 2019-07-08 RX ORDER — MONTELUKAST SODIUM 10 MG/1
TABLET ORAL
COMMUNITY
Start: 2019-05-01 | End: 2023-12-28

## 2019-07-08 RX ORDER — PREDNISONE 5 MG/1
TABLET ORAL
Qty: 90 TABLET | Refills: 6 | Status: SHIPPED | OUTPATIENT
Start: 2019-07-08 | End: 2019-12-31 | Stop reason: SDUPTHER

## 2019-07-08 RX ORDER — RISEDRONATE SODIUM 30.1; 4.9 MG/1; MG/1
1 TABLET, DELAYED RELEASE ORAL
Qty: 4 TABLET | Refills: 6 | Status: SHIPPED | OUTPATIENT
Start: 2019-07-08 | End: 2019-12-31 | Stop reason: SDUPTHER

## 2019-07-08 RX ORDER — AMLODIPINE BESYLATE 5 MG/1
5 TABLET ORAL DAILY
Qty: 30 TABLET | Refills: 11 | Status: SHIPPED | OUTPATIENT
Start: 2019-07-08 | End: 2019-12-31 | Stop reason: SDUPTHER

## 2019-07-08 NOTE — PROGRESS NOTES
Subjective:       Patient ID: Sobeida Valencia is a 64 y.o. female.    Chief Complaint: Rheumatoid Arthritis and Osteoarthritis (of both hips )      Follow up   Rheumatoid arthritis   She had some episode  Falling but improved with anthistimine.  Pain is located in multiple joints, both shoulder(s), both elbow(s), both wrist(s), both MCP(s): 1st, 2nd, 3rd, 4th and 5th, both PIP(s): 1st, 2nd, 3rd, 4th and 5th, both DIP(s): 1st and 2nd, both hip(s), both knee(s) and both MTP(s): 1st, 2nd, 3rd, 4th and 5th, is described as aching, pulsating, shooting and throbbing, and is constant, moderate .  Associated symptoms include: crepitation, decreased range of motion, edema, effusion, tenderness and warmth.  The patient is on plaquenil but not taking it routinely and duxesis. .        Review of Systems   Constitutional: Positive for activity change. Negative for appetite change and unexpected weight change.   HENT: Negative for dental problem, ear discharge, ear pain, facial swelling, mouth sores, nosebleeds, postnasal drip, rhinorrhea, sinus pressure, sneezing, tinnitus and voice change.    Eyes: Negative for photophobia, pain, discharge, redness and itching.   Respiratory: Negative for apnea, chest tightness, shortness of breath and wheezing.    Cardiovascular: Positive for leg swelling. Negative for palpitations.   Gastrointestinal: Negative for abdominal distention, constipation and diarrhea.   Endocrine: Negative for cold intolerance, heat intolerance, polydipsia and polyuria.   Genitourinary: Negative for decreased urine volume, difficulty urinating, flank pain, frequency, hematuria and urgency.   Musculoskeletal: Positive for back pain, gait problem and neck stiffness.   Skin: Negative for pallor and wound.   Allergic/Immunologic: Negative for immunocompromised state.   Neurological: Negative for dizziness and tremors.   Hematological: Negative for adenopathy. Does not bruise/bleed easily.   Psychiatric/Behavioral:  "Negative for sleep disturbance. The patient is not nervous/anxious.          Objective:     BP (!) 146/86 (BP Location: Left arm, Patient Position: Sitting, BP Method: Small (Automatic))   Pulse (!) 59   Ht 5' 3" (1.6 m)   Wt 93 kg (205 lb)   BMI 36.31 kg/m²      Physical Exam   Nursing note and vitals reviewed.  Constitutional: She is oriented to person, place, and time and well-developed, well-nourished, and in no distress.   HENT:   Head: Normocephalic and atraumatic.   Mouth/Throat: Oropharynx is clear and moist.   Eyes: EOM are normal. Pupils are equal, round, and reactive to light.   Neck: Neck supple. No thyromegaly present.   Cardiovascular: Normal rate, regular rhythm and normal heart sounds.  Exam reveals no gallop and no friction rub.    No murmur heard.  Pulmonary/Chest: Breath sounds normal. She has no wheezes. She has no rales. She exhibits no tenderness.   Abdominal: There is no tenderness. There is no rebound and no guarding.       Right Side Rheumatological Exam     Examination finds the elbow normal.    The patient is tender to palpation of the 1st PIP, 1st MCP, 2nd PIP, 2nd MCP, 3rd PIP, 3rd MCP, 4th PIP, 4th MCP, 5th PIP and 5th MCP    She has swelling of the 1st PIP, 1st MCP, 2nd PIP, 2nd MCP, 3rd PIP, 3rd MCP, 4th PIP, 4th MCP, 5th PIP and 5th MCP    Shoulder Exam   Tenderness Location: no tenderness    Range of Motion   Active abduction: abnormal   Adduction: abnormal  Sensation: normal    Knee Exam   Patellofemoral Crepitus: positive  Effusion: negative  Sensation: normal    Hip Exam   Tenderness Location: posterior  Sensation: normal    Elbow/Wrist Exam   Tenderness Location: no tenderness  Sensation: normal    Left Side Rheumatological Exam     Examination finds the elbow normal.    The patient is tender to palpation of the 1st PIP, 1st MCP, 2nd PIP, 2nd MCP, 3rd PIP, 3rd MCP, 4th PIP, 4th MCP, 5th PIP and 5th MCP.    She has swelling of the 1st PIP, 1st MCP, 2nd PIP, 2nd MCP, 3rd PIP, " 3rd MCP, 4th PIP, 4th MCP, 5th PIP and 5th MCP    Shoulder Exam   Tenderness Location: no tenderness    Range of Motion   Active abduction: abnormal   Sensation: normal    Knee Exam     Patellofemoral Crepitus: positive  Effusion: negative  Sensation: normal    Hip Exam   Tenderness Location: posterior  Sensation: normal    Elbow/Wrist Exam   Sensation: normal      Back/Neck Exam   General Inspection   Gait: normal         Lymphadenopathy:     She has no cervical adenopathy.   Neurological: She is alert and oriented to person, place, and time. Gait normal.   Skin: No rash noted. No erythema. No pallor.     Psychiatric: Mood and affect normal.   Musculoskeletal: She exhibits edema and tenderness.                  Assessment:       Encounter Diagnoses   Name Primary?    Rheumatoid arthritis of hand, unspecified laterality, unspecified rheumatoid factor presence     Primary osteoarthritis of both hips     Osteoporosis, unspecified osteoporosis type, unspecified pathological fracture presence     Rheumatoid arthritis flare     Gastroesophageal reflux disease with esophagitis          Plan:     Sobeida was seen today for rheumatoid arthritis and osteoarthritis.    Diagnoses and all orders for this visit:    Rheumatoid arthritis of hand, unspecified laterality, unspecified rheumatoid factor presence  -     amLODIPine (NORVASC) 5 MG tablet; Take 1 tablet (5 mg total) by mouth once daily.  -     ATELVIA 35 mg TbEC; Take 1 tablet (35 mg total) by mouth every 7 days.  -     ibuprofen-famotidine (DUEXIS) 800-26.6 mg Tab; Take 1 tablet by mouth 3 (three) times daily.  -     predniSONE (DELTASONE) 5 MG tablet; 1 tab to 3 tab po in prn swelling  -     hydroxychloroquine (PLAQUENIL) 200 mg tablet; Take 1 tablet (200 mg total) by mouth 2 (two) times daily.  -     CBC auto differential; Future  -     Comprehensive metabolic panel; Future  -     C-reactive protein; Future  -     Sedimentation rate, automated; Future  -      Rheumatoid factor; Future  -     Cyclic citrul peptide antibody, IgG; Future  -     Hemoglobin A1c; Future  -     Lipid panel; Future  -     TSH; Future  -     Vitamin D; Future  -     CBC auto differential  -     Comprehensive metabolic panel  -     C-reactive protein  -     Sedimentation rate, automated  -     Rheumatoid factor  -     Cyclic citrul peptide antibody, IgG  -     Hemoglobin A1c  -     Lipid panel  -     TSH  -     Vitamin D    Primary osteoarthritis of both hips  -     amLODIPine (NORVASC) 5 MG tablet; Take 1 tablet (5 mg total) by mouth once daily.  -     ATELVIA 35 mg TbEC; Take 1 tablet (35 mg total) by mouth every 7 days.  -     ibuprofen-famotidine (DUEXIS) 800-26.6 mg Tab; Take 1 tablet by mouth 3 (three) times daily.  -     predniSONE (DELTASONE) 5 MG tablet; 1 tab to 3 tab po in prn swelling  -     hydroxychloroquine (PLAQUENIL) 200 mg tablet; Take 1 tablet (200 mg total) by mouth 2 (two) times daily.  -     CBC auto differential; Future  -     Comprehensive metabolic panel; Future  -     C-reactive protein; Future  -     Sedimentation rate, automated; Future  -     Rheumatoid factor; Future  -     Cyclic citrul peptide antibody, IgG; Future  -     Hemoglobin A1c; Future  -     Lipid panel; Future  -     TSH; Future  -     Vitamin D; Future  -     CBC auto differential  -     Comprehensive metabolic panel  -     C-reactive protein  -     Sedimentation rate, automated  -     Rheumatoid factor  -     Cyclic citrul peptide antibody, IgG  -     Hemoglobin A1c  -     Lipid panel  -     TSH  -     Vitamin D  -     dexlansoprazole (DEXILANT) 60 mg capsule; Take 1 capsule (60 mg total) by mouth once daily.    Osteoporosis, unspecified osteoporosis type, unspecified pathological fracture presence  -     amLODIPine (NORVASC) 5 MG tablet; Take 1 tablet (5 mg total) by mouth once daily.  -     ATELVIA 35 mg TbEC; Take 1 tablet (35 mg total) by mouth every 7 days.  -     ibuprofen-famotidine (DUEXIS)  800-26.6 mg Tab; Take 1 tablet by mouth 3 (three) times daily.  -     predniSONE (DELTASONE) 5 MG tablet; 1 tab to 3 tab po in prn swelling  -     hydroxychloroquine (PLAQUENIL) 200 mg tablet; Take 1 tablet (200 mg total) by mouth 2 (two) times daily.  -     CBC auto differential; Future  -     Comprehensive metabolic panel; Future  -     C-reactive protein; Future  -     Sedimentation rate, automated; Future  -     Rheumatoid factor; Future  -     Cyclic citrul peptide antibody, IgG; Future  -     Hemoglobin A1c; Future  -     Lipid panel; Future  -     TSH; Future  -     Vitamin D; Future  -     CBC auto differential  -     Comprehensive metabolic panel  -     C-reactive protein  -     Sedimentation rate, automated  -     Rheumatoid factor  -     Cyclic citrul peptide antibody, IgG  -     Hemoglobin A1c  -     Lipid panel  -     TSH  -     Vitamin D  -     dexlansoprazole (DEXILANT) 60 mg capsule; Take 1 capsule (60 mg total) by mouth once daily.    Rheumatoid arthritis flare  -     predniSONE (DELTASONE) 5 MG tablet; 1 tab to 3 tab po in prn swelling  -     hydroxychloroquine (PLAQUENIL) 200 mg tablet; Take 1 tablet (200 mg total) by mouth 2 (two) times daily.  -     CBC auto differential; Future  -     Comprehensive metabolic panel; Future  -     C-reactive protein; Future  -     Sedimentation rate, automated; Future  -     Rheumatoid factor; Future  -     Cyclic citrul peptide antibody, IgG; Future  -     Hemoglobin A1c; Future  -     Lipid panel; Future  -     TSH; Future  -     Vitamin D; Future  -     CBC auto differential  -     Comprehensive metabolic panel  -     C-reactive protein  -     Sedimentation rate, automated  -     Rheumatoid factor  -     Cyclic citrul peptide antibody, IgG  -     Hemoglobin A1c  -     Lipid panel  -     TSH  -     Vitamin D  -     dexlansoprazole (DEXILANT) 60 mg capsule; Take 1 capsule (60 mg total) by mouth once daily.    Gastroesophageal reflux disease with esophagitis  -      amLODIPine (NORVASC) 5 MG tablet; Take 1 tablet (5 mg total) by mouth once daily.  -     ATELVIA 35 mg TbEC; Take 1 tablet (35 mg total) by mouth every 7 days.  -     ibuprofen-famotidine (DUEXIS) 800-26.6 mg Tab; Take 1 tablet by mouth 3 (three) times daily.  -     predniSONE (DELTASONE) 5 MG tablet; 1 tab to 3 tab po in prn swelling  -     hydroxychloroquine (PLAQUENIL) 200 mg tablet; Take 1 tablet (200 mg total) by mouth 2 (two) times daily.  -     CBC auto differential; Future  -     Comprehensive metabolic panel; Future  -     C-reactive protein; Future  -     Sedimentation rate, automated; Future  -     Rheumatoid factor; Future  -     Cyclic citrul peptide antibody, IgG; Future  -     Hemoglobin A1c; Future  -     Lipid panel; Future  -     TSH; Future  -     Vitamin D; Future  -     CBC auto differential  -     Comprehensive metabolic panel  -     C-reactive protein  -     Sedimentation rate, automated  -     Rheumatoid factor  -     Cyclic citrul peptide antibody, IgG  -     Hemoglobin A1c  -     Lipid panel  -     TSH  -     Vitamin D  -     dexlansoprazole (DEXILANT) 60 mg capsule; Take 1 capsule (60 mg total) by mouth once daily.    Plaquenil for ra slow progression   prednisone only when bad

## 2019-07-11 LAB
25(OH)D3+25(OH)D2 SERPL-MCNC: 38.6 NG/ML (ref 30–100)
ALBUMIN SERPL-MCNC: 4.5 G/DL (ref 3.6–4.8)
ALBUMIN/GLOB SERPL: 1.5 {RATIO} (ref 1.2–2.2)
ALP SERPL-CCNC: 130 IU/L (ref 39–117)
ALT SERPL-CCNC: 12 IU/L (ref 0–32)
AST SERPL-CCNC: 18 IU/L (ref 0–40)
BASOPHILS # BLD AUTO: 0.1 X10E3/UL (ref 0–0.2)
BASOPHILS NFR BLD AUTO: 0 %
BILIRUB SERPL-MCNC: 0.4 MG/DL (ref 0–1.2)
BUN SERPL-MCNC: 17 MG/DL (ref 8–27)
BUN/CREAT SERPL: 25 (ref 12–28)
CALCIUM SERPL-MCNC: 10.1 MG/DL (ref 8.7–10.3)
CCP IGA+IGG SERPL IA-ACNC: 60 UNITS (ref 0–19)
CHLORIDE SERPL-SCNC: 105 MMOL/L (ref 96–106)
CHOLEST SERPL-MCNC: 203 MG/DL (ref 100–199)
CO2 SERPL-SCNC: 22 MMOL/L (ref 20–29)
CREAT SERPL-MCNC: 0.67 MG/DL (ref 0.57–1)
CRP SERPL-MCNC: 3 MG/L (ref 0–10)
EOSINOPHIL # BLD AUTO: 0.1 X10E3/UL (ref 0–0.4)
EOSINOPHIL NFR BLD AUTO: 0 %
ERYTHROCYTE [DISTWIDTH] IN BLOOD BY AUTOMATED COUNT: 13.6 % (ref 12.3–15.4)
ERYTHROCYTE [SEDIMENTATION RATE] IN BLOOD BY WESTERGREN METHOD: 30 MM/HR (ref 0–40)
GLOBULIN SER CALC-MCNC: 3.1 G/DL (ref 1.5–4.5)
GLUCOSE SERPL-MCNC: 102 MG/DL (ref 65–99)
HBA1C MFR BLD: 5.4 % (ref 4.8–5.6)
HCT VFR BLD AUTO: 45.5 % (ref 34–46.6)
HDLC SERPL-MCNC: 81 MG/DL
HGB BLD-MCNC: 15 G/DL (ref 11.1–15.9)
LDLC SERPL CALC-MCNC: 110 MG/DL (ref 0–99)
LYMPHOCYTES # BLD AUTO: 1.8 X10E3/UL (ref 0.7–3.1)
LYMPHOCYTES NFR BLD AUTO: 10 %
MCH RBC QN AUTO: 30.5 PG (ref 26.6–33)
MCHC RBC AUTO-ENTMCNC: 33 G/DL (ref 31.5–35.7)
MCV RBC AUTO: 93 FL (ref 79–97)
MONOCYTES # BLD AUTO: 1.1 X10E3/UL (ref 0.1–0.9)
MONOCYTES NFR BLD AUTO: 6 %
NEUTROPHILS # BLD AUTO: 15.1 X10E3/UL (ref 1.4–7)
NEUTROPHILS NFR BLD AUTO: 84 %
PLATELET # BLD AUTO: 302 X10E3/UL (ref 150–450)
POTASSIUM SERPL-SCNC: 4 MMOL/L (ref 3.5–5.2)
PROT SERPL-MCNC: 7.6 G/DL (ref 6–8.5)
RBC # BLD AUTO: 4.91 X10E6/UL (ref 3.77–5.28)
RHEUMATOID FACT SERPL-ACNC: 14.1 IU/ML (ref 0–13.9)
SODIUM SERPL-SCNC: 144 MMOL/L (ref 134–144)
TRIGL SERPL-MCNC: 60 MG/DL (ref 0–149)
TSH SERPL DL<=0.005 MIU/L-ACNC: 0.49 UIU/ML (ref 0.45–4.5)
VLDLC SERPL CALC-MCNC: 12 MG/DL (ref 5–40)
WBC # BLD AUTO: 18.4 X10E3/UL (ref 3.4–10.8)

## 2019-07-18 DIAGNOSIS — R21 RASH: ICD-10-CM

## 2019-07-19 RX ORDER — CLOTRIMAZOLE AND BETAMETHASONE DIPROPIONATE 10; .64 MG/G; MG/G
CREAM TOPICAL 2 TIMES DAILY
Qty: 45 G | Refills: 3 | Status: SHIPPED | OUTPATIENT
Start: 2019-07-19 | End: 2020-07-01 | Stop reason: SDUPTHER

## 2019-07-24 ENCOUNTER — TELEPHONE (OUTPATIENT)
Dept: RHEUMATOLOGY | Facility: CLINIC | Age: 64
End: 2019-07-24

## 2019-07-24 DIAGNOSIS — D72.829 LEUKOCYTOSIS, UNSPECIFIED TYPE: Primary | ICD-10-CM

## 2019-07-24 NOTE — TELEPHONE ENCOUNTER
States feeling fine at this time and at time of blood draw was feeling fine. Pt states she has minor seasonal allergies, but again was feeling at time of blood draw.   Pt advised to star milk thistle. Requested results be mailed to confirmed address. Results mailed.

## 2019-07-24 NOTE — TELEPHONE ENCOUNTER
----- Message from Clinton Pulido MD sent at 7/13/2019 11:04 PM CDT -----  Pt wbc count is elevated, ccp is elevated and alk phos that is elevated, add milk thistle and she may need an abx

## 2019-08-08 NOTE — TELEPHONE ENCOUNTER
Spoke with pt, advises she will need to repeat CBS to reacheck labs. Pt is currently in [place] with family. She would like Lab order mailed to her as she will not be back home until maybe November. Pt provided address of [address] [place] [address]. Order printed and placed in mail.

## 2019-08-22 ENCOUNTER — TELEPHONE (OUTPATIENT)
Dept: RHEUMATOLOGY | Facility: CLINIC | Age: 64
End: 2019-08-22

## 2019-08-22 NOTE — TELEPHONE ENCOUNTER
----- Message from Giovanna Bowman sent at 8/22/2019  9:07 AM CDT -----  Type: Needs Medical Advice    Who Called:  Patient  Pharmacy name and phone #:    [email or web address] - [place] - [address] [facility]  [address] [place] [address]  [place] [address]  Phone: [phone] Fax: [phone]    Additional Information: Patient is out of town and will be for awhile. It is getting expensive for her  to keep mailing her medications to her. She is asking for all the medications that Dr. Pulido prescribes for her to be sent for a 90 day supply so that it will be easier for her to get. Please call to advise.

## 2019-08-22 NOTE — TELEPHONE ENCOUNTER
Pt notified that pharmacy advised that 90 day supply for medicaitons approved. Pt verbalized understanding.

## 2019-08-23 ENCOUNTER — DOCUMENTATION ONLY (OUTPATIENT)
Dept: RHEUMATOLOGY | Facility: CLINIC | Age: 64
End: 2019-08-23

## 2019-08-23 ENCOUNTER — TELEPHONE (OUTPATIENT)
Dept: RHEUMATOLOGY | Facility: CLINIC | Age: 64
End: 2019-08-23

## 2019-08-23 NOTE — TELEPHONE ENCOUNTER
----- Message from Desiree Adan sent at 8/23/2019 10:06 AM CDT -----  Contact: [facility]  Type:  Pharmacy Calling to Clarify an RX    Name of Caller:  Shaniqua  Pharmacy Name:  [facility]  Prescription Name:  ibuprofen-famotidine (DUEXIS) 800-26.6 mg Tab  What do they need to clarify?:  Medication needs PA--will be faxing over  Best Call Back Number:  [phone]  Additional Information:  Will be faxing over PA--please advise--thank you

## 2019-08-23 NOTE — PROGRESS NOTES
PA started for duexis via telephone with [facility] awaiting insurance decision. [facility] pharmacy contacted and informed PA is process

## 2019-08-23 NOTE — TELEPHONE ENCOUNTER
Returned pharmacy call regarding PA on Duexis. Nurse confirmed pharmacy has correct fax number. Advised will try to get it approved. Office was informed patient is waiting to have it mailed. Needed to know once PA went through so medication can be sent.

## 2019-08-26 ENCOUNTER — DOCUMENTATION ONLY (OUTPATIENT)
Dept: RHEUMATOLOGY | Facility: CLINIC | Age: 64
End: 2019-08-26

## 2019-08-26 NOTE — PROGRESS NOTES
Received denial letter for Duexis. Patient needs to try 3 NSAIDs with acid blocker separately. Letter sent to scan

## 2019-09-04 DIAGNOSIS — M16.0 PRIMARY OSTEOARTHRITIS OF BOTH HIPS: ICD-10-CM

## 2019-09-04 DIAGNOSIS — M81.0 OSTEOPOROSIS, UNSPECIFIED OSTEOPOROSIS TYPE, UNSPECIFIED PATHOLOGICAL FRACTURE PRESENCE: ICD-10-CM

## 2019-09-04 DIAGNOSIS — K21.00 GASTROESOPHAGEAL REFLUX DISEASE WITH ESOPHAGITIS: ICD-10-CM

## 2019-09-04 DIAGNOSIS — M06.9 RHEUMATOID ARTHRITIS OF HAND, UNSPECIFIED LATERALITY, UNSPECIFIED RHEUMATOID FACTOR PRESENCE: ICD-10-CM

## 2019-09-04 NOTE — TELEPHONE ENCOUNTER
----- Message from Yamileth Chamberlain sent at 9/4/2019  3:16 PM CDT -----  Contact: self   Patient need to speak to nurse regarding insurance denied ibuprofen-famotidine (DUEXIS) 800-26.6 mg Tab     Patient requesting to speak to nurse       Patient contact is [phone] (home)

## 2019-09-05 RX ORDER — IBUPROFEN AND FAMOTIDINE 26.6; 8 MG/1; MG/1
1 TABLET ORAL 3 TIMES DAILY PRN
Qty: 90 TABLET | Refills: 5 | Status: SHIPPED | OUTPATIENT
Start: 2019-09-05 | End: 2019-11-13 | Stop reason: SDUPTHER

## 2019-09-09 ENCOUNTER — TELEPHONE (OUTPATIENT)
Dept: RHEUMATOLOGY | Facility: CLINIC | Age: 64
End: 2019-09-09

## 2019-09-09 DIAGNOSIS — D72.829 LEUKOCYTOSIS, UNSPECIFIED TYPE: ICD-10-CM

## 2019-09-09 DIAGNOSIS — M06.9 RHEUMATOID ARTHRITIS OF HAND, UNSPECIFIED LATERALITY, UNSPECIFIED RHEUMATOID FACTOR PRESENCE: Primary | ICD-10-CM

## 2019-09-09 NOTE — TELEPHONE ENCOUNTER
Contacted patient regarding lab results. Informed WBC still elevated but improving. Nurse asked if she had lab orders for next appointment. Patient stated she does not have lab orders would like orders sent to [facility]. Stated will be home around thanksgiving and will have them done then.

## 2019-10-17 ENCOUNTER — TELEPHONE (OUTPATIENT)
Dept: RHEUMATOLOGY | Facility: CLINIC | Age: 64
End: 2019-10-17

## 2019-10-17 NOTE — TELEPHONE ENCOUNTER
----- Message from Jam Chacon sent at 10/17/2019 12:43 PM CDT -----  Type: Needs Medical Advice    Who Called: self   Symptoms (please be specific):    How long has patient had these symptoms:    Pharmacy name and phone #:  [facility]  Best Call Back Number: [phone]  Additional Information: Patient states the insurance will not cover rx duexis

## 2019-10-21 NOTE — TELEPHONE ENCOUNTER
----- Message from Meek Preston sent at 10/21/2019  2:03 PM CDT -----  Contact: Patient  Type: Needs Medical Advice    Who Called:  Patient  Best Call Back Number: [phone]  Additional Information: Patient would like to verify status of previous message. Please call to advise. Thanks!

## 2019-10-21 NOTE — TELEPHONE ENCOUNTER
Spoke with [facility] for Duexis, she advises [facility] is not under the [facility] program only [facility] is under program in [place]. [facility] # [phone]. Contacted [facility], [place]  Advises they have been shipping to , last filled 09/09/19. Confirmed that labs were mailed to her in [place], we will also fax them to [facility]. No further questions.

## 2019-11-13 DIAGNOSIS — M16.0 PRIMARY OSTEOARTHRITIS OF BOTH HIPS: ICD-10-CM

## 2019-11-13 DIAGNOSIS — M06.9 RHEUMATOID ARTHRITIS OF HAND, UNSPECIFIED LATERALITY, UNSPECIFIED RHEUMATOID FACTOR PRESENCE: ICD-10-CM

## 2019-11-13 NOTE — TELEPHONE ENCOUNTER
----- Message from Ankitjl Al sent at 11/13/2019  4:05 PM CST -----  Contact: [facility]   [facility] called, she need to speak with a nurse regarding a prior authorization for rx ibuprofen-famotidine (DUEXIS) 800-26.6 mg Tab. Please call back at [phone] or fax over the last chart notes

## 2019-11-14 RX ORDER — IBUPROFEN AND FAMOTIDINE 26.6; 8 MG/1; MG/1
1 TABLET ORAL 3 TIMES DAILY PRN
Qty: 90 TABLET | Refills: 3 | Status: SHIPPED | OUTPATIENT
Start: 2019-11-14 | End: 2020-06-18 | Stop reason: SDUPTHER

## 2019-12-12 ENCOUNTER — DOCUMENTATION ONLY (OUTPATIENT)
Dept: RHEUMATOLOGY | Facility: CLINIC | Age: 64
End: 2019-12-12

## 2019-12-12 ENCOUNTER — TELEPHONE (OUTPATIENT)
Dept: RHEUMATOLOGY | Facility: CLINIC | Age: 64
End: 2019-12-12

## 2019-12-12 NOTE — TELEPHONE ENCOUNTER
Contacted patient regarding lab orders. Nurse informed orders will be printed and faxed to [facility] in [place] at [phone]. Patient verbalized understanding.

## 2019-12-12 NOTE — TELEPHONE ENCOUNTER
----- Message from Trupti Szymanski sent at 12/12/2019 11:10 AM CST -----  Contact: pt  ..Type: Needs Medical Advice    Who Called: Pt  Best Call Back Number: .[phone] (home)   Additional Information: Pt requesting to speak with you regarding her lab orders. She stated she never received her orders in the mail.  Please call to advise.  Thanks

## 2019-12-16 ENCOUNTER — TELEPHONE (OUTPATIENT)
Dept: RHEUMATOLOGY | Facility: CLINIC | Age: 64
End: 2019-12-16

## 2019-12-16 NOTE — TELEPHONE ENCOUNTER
----- Message from Ventura Crespo sent at 12/16/2019  1:30 PM CST -----  Contact: same  Patient called in and stated she spoke to nurse last week about faxing over lab orders to [facility] in [place], [place].  Patient stated she went today & [facility] did not have them.    Patient would like a call back at [phone]

## 2019-12-16 NOTE — TELEPHONE ENCOUNTER
Returned patient regarding lab orders. Nurse informed orders were faxed to [phone]. Patient stated that is the correct fax number. Patient requested lab orders to be mailed to home address instead. Will be home soon and will have them done on Saturday.

## 2019-12-23 LAB
ALBUMIN SERPL-MCNC: 4.1 G/DL (ref 3.6–4.8)
ALBUMIN/GLOB SERPL: 1.5 {RATIO} (ref 1.2–2.2)
ALP SERPL-CCNC: 113 IU/L (ref 39–117)
ALT SERPL-CCNC: 11 IU/L (ref 0–32)
AST SERPL-CCNC: 17 IU/L (ref 0–40)
BASOPHILS # BLD AUTO: 0.1 X10E3/UL (ref 0–0.2)
BASOPHILS NFR BLD AUTO: 1 %
BILIRUB SERPL-MCNC: 0.3 MG/DL (ref 0–1.2)
BUN SERPL-MCNC: 16 MG/DL (ref 8–27)
BUN/CREAT SERPL: 25 (ref 12–28)
CALCIUM SERPL-MCNC: 9.7 MG/DL (ref 8.7–10.3)
CHLORIDE SERPL-SCNC: 105 MMOL/L (ref 96–106)
CO2 SERPL-SCNC: 25 MMOL/L (ref 20–29)
CREAT SERPL-MCNC: 0.65 MG/DL (ref 0.57–1)
CRP SERPL-MCNC: 7 MG/L (ref 0–10)
EOSINOPHIL # BLD AUTO: 0.4 X10E3/UL (ref 0–0.4)
EOSINOPHIL NFR BLD AUTO: 3 %
ERYTHROCYTE [DISTWIDTH] IN BLOOD BY AUTOMATED COUNT: 13.2 % (ref 12.3–15.4)
ERYTHROCYTE [SEDIMENTATION RATE] IN BLOOD BY WESTERGREN METHOD: 27 MM/HR (ref 0–40)
GLOBULIN SER CALC-MCNC: 2.7 G/DL (ref 1.5–4.5)
GLUCOSE SERPL-MCNC: 96 MG/DL (ref 65–99)
HCT VFR BLD AUTO: 44.1 % (ref 34–46.6)
HGB BLD-MCNC: 13.8 G/DL (ref 11.1–15.9)
LYMPHOCYTES # BLD AUTO: 2.3 X10E3/UL (ref 0.7–3.1)
LYMPHOCYTES NFR BLD AUTO: 17 %
MCH RBC QN AUTO: 29.3 PG (ref 26.6–33)
MCHC RBC AUTO-ENTMCNC: 31.3 G/DL (ref 31.5–35.7)
MCV RBC AUTO: 94 FL (ref 79–97)
MONOCYTES # BLD AUTO: 1.1 X10E3/UL (ref 0.1–0.9)
MONOCYTES NFR BLD AUTO: 8 %
NEUTROPHILS # BLD AUTO: 9.2 X10E3/UL (ref 1.4–7)
NEUTROPHILS NFR BLD AUTO: 71 %
PLATELET # BLD AUTO: 252 X10E3/UL (ref 150–450)
POTASSIUM SERPL-SCNC: 3.9 MMOL/L (ref 3.5–5.2)
PROT SERPL-MCNC: 6.8 G/DL (ref 6–8.5)
RBC # BLD AUTO: 4.71 X10E6/UL (ref 3.77–5.28)
SODIUM SERPL-SCNC: 147 MMOL/L (ref 134–144)
WBC # BLD AUTO: 13.1 X10E3/UL (ref 3.4–10.8)

## 2019-12-27 ENCOUNTER — TELEPHONE (OUTPATIENT)
Dept: RHEUMATOLOGY | Facility: CLINIC | Age: 64
End: 2019-12-27

## 2019-12-27 NOTE — TELEPHONE ENCOUNTER
----- Message from Jam Chacon sent at 12/27/2019 12:02 PM CST -----  Type:  Patient Returning Call    Who Called:  Self   Who Left Message for Patient: Karla Does the patient know what this is regarding?:    Best Call Back Number:  [phone]  Additional Information:

## 2019-12-27 NOTE — TELEPHONE ENCOUNTER
Spoke to pt confirmed her and sister's appts for 12/31/19 with Dr. Pulido. Moved appts closer together for 11am and 11:30am.

## 2019-12-31 ENCOUNTER — OFFICE VISIT (OUTPATIENT)
Dept: RHEUMATOLOGY | Facility: CLINIC | Age: 64
End: 2019-12-31
Payer: COMMERCIAL

## 2019-12-31 VITALS
BODY MASS INDEX: 35.2 KG/M2 | DIASTOLIC BLOOD PRESSURE: 87 MMHG | WEIGHT: 198.63 LBS | HEART RATE: 58 BPM | HEIGHT: 63 IN | SYSTOLIC BLOOD PRESSURE: 154 MMHG

## 2019-12-31 DIAGNOSIS — M06.9 RHEUMATOID ARTHRITIS OF HAND, UNSPECIFIED LATERALITY, UNSPECIFIED RHEUMATOID FACTOR PRESENCE: Primary | ICD-10-CM

## 2019-12-31 DIAGNOSIS — K21.00 GASTROESOPHAGEAL REFLUX DISEASE WITH ESOPHAGITIS: ICD-10-CM

## 2019-12-31 DIAGNOSIS — M81.0 OSTEOPOROSIS, UNSPECIFIED OSTEOPOROSIS TYPE, UNSPECIFIED PATHOLOGICAL FRACTURE PRESENCE: ICD-10-CM

## 2019-12-31 DIAGNOSIS — M16.0 PRIMARY OSTEOARTHRITIS OF BOTH HIPS: ICD-10-CM

## 2019-12-31 PROCEDURE — 96372 THER/PROPH/DIAG INJ SC/IM: CPT | Mod: S$GLB,,, | Performed by: INTERNAL MEDICINE

## 2019-12-31 PROCEDURE — 3008F PR BODY MASS INDEX (BMI) DOCUMENTED: ICD-10-PCS | Mod: CPTII,S$GLB,, | Performed by: INTERNAL MEDICINE

## 2019-12-31 PROCEDURE — 99214 OFFICE O/P EST MOD 30 MIN: CPT | Mod: 25,S$GLB,, | Performed by: INTERNAL MEDICINE

## 2019-12-31 PROCEDURE — 3008F BODY MASS INDEX DOCD: CPT | Mod: CPTII,S$GLB,, | Performed by: INTERNAL MEDICINE

## 2019-12-31 PROCEDURE — 96372 PR INJECTION,THERAP/PROPH/DIAG2ST, IM OR SUBCUT: ICD-10-PCS | Mod: S$GLB,,, | Performed by: INTERNAL MEDICINE

## 2019-12-31 PROCEDURE — 99999 PR PBB SHADOW E&M-EST. PATIENT-LVL IV: ICD-10-PCS | Mod: PBBFAC,,, | Performed by: INTERNAL MEDICINE

## 2019-12-31 PROCEDURE — 99999 PR PBB SHADOW E&M-EST. PATIENT-LVL IV: CPT | Mod: PBBFAC,,, | Performed by: INTERNAL MEDICINE

## 2019-12-31 PROCEDURE — 99214 PR OFFICE/OUTPT VISIT, EST, LEVL IV, 30-39 MIN: ICD-10-PCS | Mod: 25,S$GLB,, | Performed by: INTERNAL MEDICINE

## 2019-12-31 RX ORDER — AMLODIPINE BESYLATE 5 MG/1
5 TABLET ORAL DAILY
Qty: 30 TABLET | Refills: 11 | Status: SHIPPED | OUTPATIENT
Start: 2019-12-31 | End: 2020-07-07 | Stop reason: SDUPTHER

## 2019-12-31 RX ORDER — RISEDRONATE SODIUM 30.1; 4.9 MG/1; MG/1
1 TABLET, DELAYED RELEASE ORAL
Qty: 4 TABLET | Refills: 6 | Status: SHIPPED | OUTPATIENT
Start: 2019-12-31 | End: 2020-07-07 | Stop reason: SDUPTHER

## 2019-12-31 RX ORDER — HYDROXYCHLOROQUINE SULFATE 200 MG/1
200 TABLET, FILM COATED ORAL 2 TIMES DAILY
Qty: 60 TABLET | Refills: 11 | Status: SHIPPED | OUTPATIENT
Start: 2019-12-31 | End: 2020-07-07 | Stop reason: SDUPTHER

## 2019-12-31 RX ORDER — DEXLANSOPRAZOLE 60 MG/1
60 CAPSULE, DELAYED RELEASE ORAL DAILY
Qty: 30 CAPSULE | Refills: 11 | Status: SHIPPED | OUTPATIENT
Start: 2019-12-31 | End: 2020-06-23 | Stop reason: SDUPTHER

## 2019-12-31 RX ORDER — METHOCARBAMOL 750 MG/1
750 TABLET, FILM COATED ORAL 3 TIMES DAILY
Qty: 90 TABLET | Refills: 3 | Status: SHIPPED | OUTPATIENT
Start: 2019-12-31 | End: 2020-01-30

## 2019-12-31 RX ORDER — KETOROLAC TROMETHAMINE 30 MG/ML
60 INJECTION, SOLUTION INTRAMUSCULAR; INTRAVENOUS
Status: COMPLETED | OUTPATIENT
Start: 2019-12-31 | End: 2019-12-31

## 2019-12-31 RX ORDER — PREDNISONE 5 MG/1
TABLET ORAL
Qty: 90 TABLET | Refills: 6 | Status: SHIPPED | OUTPATIENT
Start: 2019-12-31 | End: 2020-11-27 | Stop reason: SDUPTHER

## 2019-12-31 RX ORDER — NITROFURANTOIN 25; 75 MG/1; MG/1
100 CAPSULE ORAL 2 TIMES DAILY
Qty: 20 CAPSULE | Refills: 0 | Status: SHIPPED | OUTPATIENT
Start: 2019-12-31 | End: 2020-01-10

## 2019-12-31 RX ORDER — METHYLPREDNISOLONE ACETATE 80 MG/ML
160 INJECTION, SUSPENSION INTRA-ARTICULAR; INTRALESIONAL; INTRAMUSCULAR; SOFT TISSUE
Status: COMPLETED | OUTPATIENT
Start: 2019-12-31 | End: 2019-12-31

## 2019-12-31 RX ADMIN — KETOROLAC TROMETHAMINE 60 MG: 30 INJECTION, SOLUTION INTRAMUSCULAR; INTRAVENOUS at 02:12

## 2019-12-31 RX ADMIN — METHYLPREDNISOLONE ACETATE 160 MG: 80 INJECTION, SUSPENSION INTRA-ARTICULAR; INTRALESIONAL; INTRAMUSCULAR; SOFT TISSUE at 02:12

## 2019-12-31 ASSESSMENT — ROUTINE ASSESSMENT OF PATIENT INDEX DATA (RAPID3)
PAIN SCORE: 8
PATIENT GLOBAL ASSESSMENT SCORE: 7
MDHAQ FUNCTION SCORE: .3
TOTAL RAPID3 SCORE: 5.33
FATIGUE SCORE: 2.2
PSYCHOLOGICAL DISTRESS SCORE: 0

## 2019-12-31 NOTE — PROGRESS NOTES
Administered 2 cc ( 80 mg/ml ) of depomedrol to the right upper outer gluteal. Informed of s/s to report verbalized understanding. No adverse reactions noted.    Lot # [id number]  Expiration 05/2021    Administered 2 cc ( 30 mg/ml ) of toradol to the left upper outer gluteal. Informed of s/s to report verbalized understanding. No adverse reactions noted.    Lot # [id number]  Expiration 1 aug 2020

## 2019-12-31 NOTE — PROGRESS NOTES
Subjective:       Patient ID: Sobeida Valencia is a 64 y.o. female.    Chief Complaint: Disease Management       Rheumatoid arthritis   she has  RA on duexis and plaquenil, Pain is located in multiple joints, both shoulder(s), both elbow(s), both wrist(s), both MCP(s): 1st, 2nd, 3rd, 4th and 5th, both PIP(s): 1st, 2nd, 3rd, 4th and 5th, both DIP(s): 1st and 2nd, both hip(s), both knee(s) and both MTP(s): 1st, 2nd, 3rd, 4th and 5th, is described as aching, pulsating, shooting and throbbing, and is constant, moderate .  Associated symptoms include: crepitation, decreased range of motion, edema, effusion, tenderness and warmth.                Associated symptoms include fatigue.         Review of Systems   Constitutional: Positive for activity change and fatigue. Negative for appetite change and unexpected weight change.   HENT: Negative for dental problem, ear discharge, ear pain, facial swelling, mouth sores, nosebleeds, postnasal drip, rhinorrhea, sinus pressure, sneezing, tinnitus and voice change.    Eyes: Negative for photophobia, pain, discharge, redness and itching.   Respiratory: Negative for apnea, chest tightness, shortness of breath and wheezing.    Cardiovascular: Positive for leg swelling. Negative for palpitations.   Gastrointestinal: Negative for abdominal distention, constipation and diarrhea.   Endocrine: Negative for cold intolerance, heat intolerance, polydipsia and polyuria.   Genitourinary: Negative for decreased urine volume, difficulty urinating, flank pain, frequency, hematuria and urgency.   Musculoskeletal: Positive for back pain, gait problem and neck stiffness.   Skin: Negative for pallor and wound.   Allergic/Immunologic: Negative for immunocompromised state.   Neurological: Negative for dizziness and tremors.   Hematological: Negative for adenopathy. Does not bruise/bleed easily.   Psychiatric/Behavioral: Negative for sleep disturbance. The patient is not nervous/anxious.       "    Objective:     BP (!) 154/87 (BP Location: Left arm, Patient Position: Sitting, BP Method: Medium (Automatic))   Pulse (!) 58   Ht 5' 3" (1.6 m)   Wt 90.1 kg (198 lb 10.2 oz)   BMI 35.19 kg/m²      Physical Exam   Nursing note and vitals reviewed.  Constitutional: She is oriented to person, place, and time and well-developed, well-nourished, and in no distress.   HENT:   Head: Normocephalic and atraumatic.   Mouth/Throat: Oropharynx is clear and moist.   Eyes: EOM are normal. Pupils are equal, round, and reactive to light.   Neck: Neck supple. No thyromegaly present.   Cardiovascular: Normal rate, regular rhythm and normal heart sounds.  Exam reveals no gallop and no friction rub.    No murmur heard.  Pulmonary/Chest: Breath sounds normal. She has no wheezes. She has no rales. She exhibits no tenderness.   Abdominal: There is no tenderness. There is no rebound and no guarding.       Right Side Rheumatological Exam     Examination finds the elbow normal.    The patient is tender to palpation of the 1st PIP, 1st MCP, 2nd PIP, 2nd MCP, 3rd PIP, 3rd MCP, 4th PIP, 4th MCP, 5th PIP and 5th MCP    She has swelling of the 1st PIP, 1st MCP, 2nd PIP, 2nd MCP, 3rd PIP, 3rd MCP, 4th PIP, 4th MCP, 5th PIP and 5th MCP    Shoulder Exam   Tenderness Location: no tenderness    Range of Motion   Active abduction: abnormal   Adduction: abnormal  Sensation: normal    Knee Exam   Patellofemoral Crepitus: positive  Effusion: negative  Sensation: normal    Hip Exam   Tenderness Location: posterior  Sensation: normal    Elbow/Wrist Exam   Tenderness Location: no tenderness  Sensation: normal    Left Side Rheumatological Exam     Examination finds the elbow normal.    The patient is tender to palpation of the 1st PIP, 1st MCP, 2nd PIP, 2nd MCP, 3rd PIP, 3rd MCP, 4th PIP, 4th MCP, 5th PIP and 5th MCP.    She has swelling of the 1st PIP, 1st MCP, 2nd PIP, 2nd MCP, 3rd PIP, 3rd MCP, 4th PIP, 4th MCP, 5th PIP and 5th MCP    Shoulder " Exam   Tenderness Location: no tenderness    Range of Motion   Active abduction: abnormal   Sensation: normal    Knee Exam     Patellofemoral Crepitus: positive  Effusion: negative  Sensation: normal    Hip Exam   Tenderness Location: posterior  Sensation: normal    Elbow/Wrist Exam   Sensation: normal      Back/Neck Exam   General Inspection   Gait: normal         Lymphadenopathy:     She has no cervical adenopathy.   Neurological: She is alert and oriented to person, place, and time. Gait normal.   Skin: No rash noted. No erythema. No pallor.     Psychiatric: Mood and affect normal.   Musculoskeletal: She exhibits edema, tenderness and deformity.          Results for orders placed or performed in visit on 09/09/19   CBC auto differential   Result Value Ref Range    WBC 13.1 (H) 3.4 - 10.8 x10E3/uL    RBC 4.71 3.77 - 5.28 x10E6/uL    Hemoglobin 13.8 11.1 - 15.9 g/dL    Hematocrit 44.1 34.0 - 46.6 %    Mean Corpuscular Volume 94 79 - 97 fL    Mean Corpuscular Hemoglobin 29.3 26.6 - 33.0 pg    Mean Corpuscular Hemoglobin Conc 31.3 (L) 31.5 - 35.7 g/dL    RDW 13.2 12.3 - 15.4 %    Platelets 252 150 - 450 x10E3/uL    Neutrophils 71 Not Estab. %    Lymph% 17 Not Estab. %    Mono% 8 Not Estab. %    Eosinophil% 3 Not Estab. %    Basophil% 1 Not Estab. %    Neutrophils Absolute 9.2 (H) 1.4 - 7.0 x10E3/uL    Lymph # 2.3 0.7 - 3.1 x10E3/uL    Mono # 1.1 (H) 0.1 - 0.9 x10E3/uL    Eos # 0.4 0.0 - 0.4 x10E3/uL    Baso # 0.1 0.0 - 0.2 x10E3/uL   Comprehensive metabolic panel   Result Value Ref Range    Glucose 96 65 - 99 mg/dL    BUN, Bld 16 8 - 27 mg/dL    Creatinine 0.65 0.57 - 1.00 mg/dL    eGFR if non African American 94 >59 mL/min/1.73    eGFR if African American 109 >59 mL/min/1.73    BUN/Creatinine Ratio 25 12 - 28    Sodium 147 (H) 134 - 144 mmol/L    Potassium 3.9 3.5 - 5.2 mmol/L    Chloride 105 96 - 106 mmol/L    CO2 25 20 - 29 mmol/L    Calcium 9.7 8.7 - 10.3 mg/dL    Total Protein 6.8 6.0 - 8.5 g/dL    Albumin 4.1  3.6 - 4.8 g/dL    Globulin, Total 2.7 1.5 - 4.5 g/dL    Albumin/Globulin Ratio 1.5 1.2 - 2.2    Total Bilirubin 0.3 0.0 - 1.2 mg/dL    Alkaline Phosphatase 113 39 - 117 IU/L    AST 17 0 - 40 IU/L    ALT 11 0 - 32 IU/L   C-reactive protein   Result Value Ref Range    CRP 7 0 - 10 mg/L   Sedimentation rate, automated   Result Value Ref Range    Sed Rate 27 0 - 40 mm/hr       reviewed labs with patient during this visit           Assessment:       Encounter Diagnoses   Name Primary?    Rheumatoid arthritis of hand, unspecified laterality, unspecified rheumatoid factor presence Yes    Primary osteoarthritis of both hips     Gastroesophageal reflux disease with esophagitis     Osteoporosis, unspecified osteoporosis type, unspecified pathological fracture presence          Plan:     Sobeida was seen today for disease management.    Diagnoses and all orders for this visit:    Rheumatoid arthritis of hand, unspecified laterality, unspecified rheumatoid factor presence  -     Urinalysis; Future  -     Urine culture; Future  -     Urinalysis  -     Urine culture  -     nitrofurantoin, macrocrystal-monohydrate, (MACROBID) 100 MG capsule; Take 1 capsule (100 mg total) by mouth 2 (two) times daily. for 10 days  -     predniSONE (DELTASONE) 5 MG tablet; 1 tab to 3 tab po in prn swelling  -     hydroxychloroquine (PLAQUENIL) 200 mg tablet; Take 1 tablet (200 mg total) by mouth 2 (two) times daily.  -     amLODIPine (NORVASC) 5 MG tablet; Take 1 tablet (5 mg total) by mouth once daily.  -     ATELVIA 35 mg TbEC; Take 1 tablet (35 mg total) by mouth every 7 days.  -     Cancel: DXA Bone Density Spine And Hip; Future  -     Cancel: DXA Bone Density Spine And Hip  -     methocarbamol (ROBAXIN) 750 MG Tab; Take 1 tablet (750 mg total) by mouth 3 (three) times daily.  -     methylPREDNISolone acetate injection 160 mg  -     ketorolac injection 60 mg  -     Uric acid; Future  -     Uric acid  -     DXA Bone Density Spine And Hip;  Future    Primary osteoarthritis of both hips  -     Urinalysis; Future  -     Urine culture; Future  -     Urinalysis  -     Urine culture  -     nitrofurantoin, macrocrystal-monohydrate, (MACROBID) 100 MG capsule; Take 1 capsule (100 mg total) by mouth 2 (two) times daily. for 10 days  -     predniSONE (DELTASONE) 5 MG tablet; 1 tab to 3 tab po in prn swelling  -     hydroxychloroquine (PLAQUENIL) 200 mg tablet; Take 1 tablet (200 mg total) by mouth 2 (two) times daily.  -     amLODIPine (NORVASC) 5 MG tablet; Take 1 tablet (5 mg total) by mouth once daily.  -     dexlansoprazole (DEXILANT) 60 mg capsule; Take 1 capsule (60 mg total) by mouth once daily.  -     ATELVIA 35 mg TbEC; Take 1 tablet (35 mg total) by mouth every 7 days.  -     Cancel: DXA Bone Density Spine And Hip; Future  -     Cancel: DXA Bone Density Spine And Hip  -     methocarbamol (ROBAXIN) 750 MG Tab; Take 1 tablet (750 mg total) by mouth 3 (three) times daily.  -     methylPREDNISolone acetate injection 160 mg  -     ketorolac injection 60 mg  -     Uric acid; Future  -     Uric acid  -     DXA Bone Density Spine And Hip; Future    Gastroesophageal reflux disease with esophagitis  -     Urinalysis; Future  -     Urine culture; Future  -     Urinalysis  -     Urine culture  -     nitrofurantoin, macrocrystal-monohydrate, (MACROBID) 100 MG capsule; Take 1 capsule (100 mg total) by mouth 2 (two) times daily. for 10 days  -     predniSONE (DELTASONE) 5 MG tablet; 1 tab to 3 tab po in prn swelling  -     hydroxychloroquine (PLAQUENIL) 200 mg tablet; Take 1 tablet (200 mg total) by mouth 2 (two) times daily.  -     amLODIPine (NORVASC) 5 MG tablet; Take 1 tablet (5 mg total) by mouth once daily.  -     dexlansoprazole (DEXILANT) 60 mg capsule; Take 1 capsule (60 mg total) by mouth once daily.  -     ATELVIA 35 mg TbEC; Take 1 tablet (35 mg total) by mouth every 7 days.  -     Cancel: DXA Bone Density Spine And Hip; Future  -     Cancel: DXA Bone  Density Spine And Hip  -     methocarbamol (ROBAXIN) 750 MG Tab; Take 1 tablet (750 mg total) by mouth 3 (three) times daily.  -     methylPREDNISolone acetate injection 160 mg  -     ketorolac injection 60 mg  -     Uric acid; Future  -     Uric acid  -     DXA Bone Density Spine And Hip; Future    Osteoporosis, unspecified osteoporosis type, unspecified pathological fracture presence  -     predniSONE (DELTASONE) 5 MG tablet; 1 tab to 3 tab po in prn swelling  -     hydroxychloroquine (PLAQUENIL) 200 mg tablet; Take 1 tablet (200 mg total) by mouth 2 (two) times daily.  -     amLODIPine (NORVASC) 5 MG tablet; Take 1 tablet (5 mg total) by mouth once daily.  -     dexlansoprazole (DEXILANT) 60 mg capsule; Take 1 capsule (60 mg total) by mouth once daily.  -     ATELVIA 35 mg TbEC; Take 1 tablet (35 mg total) by mouth every 7 days.  -     Cancel: DXA Bone Density Spine And Hip; Future  -     Cancel: DXA Bone Density Spine And Hip  -     methocarbamol (ROBAXIN) 750 MG Tab; Take 1 tablet (750 mg total) by mouth 3 (three) times daily.  -     methylPREDNISolone acetate injection 160 mg  -     ketorolac injection 60 mg  -     Uric acid; Future  -     Uric acid  -     DXA Bone Density Spine And Hip; Future    Plaquenil for ra slow progression   prednisone only when bad

## 2020-01-13 ENCOUNTER — TELEPHONE (OUTPATIENT)
Dept: RHEUMATOLOGY | Facility: CLINIC | Age: 65
End: 2020-01-13

## 2020-01-13 NOTE — TELEPHONE ENCOUNTER
Spoke to pt, she can not remember which medication Dr. Pulido wanted her to hold off on taking until after her labs. Reviewed which rxns she received at her visit. Advised will clarify with Dr. Pulido.

## 2020-01-13 NOTE — TELEPHONE ENCOUNTER
----- Message from Mary Tristan sent at 1/13/2020  3:00 PM CST -----  Contact: self  Type: Needs Medical Advice    Who Called:  self  Symptoms (please be specific):    How long has patient had these symptoms:    Pharmacy name and phone #:    Best Call Back Number: [phone] (home)   Additional Information: Patient would like a call back regarding the labs and medication she spoke to the doctor about on 12/31/19. Please call patient to advise. Thanks!

## 2020-01-15 NOTE — TELEPHONE ENCOUNTER
Spoke to pt, advised priscila Pulido to not take the indomethacin and duexis together. Pt advises she is no longer taking the indometacin. Advised she can take the Macrobid and Robaxin. Pt will fill these tomorrow. She advises she had lab work done yesterday 1/14/2020. Advised as soon as we receive results we will call back with further advisement. Pt verbalized understanding.

## 2020-01-16 LAB
APPEARANCE UR: CLEAR
BACTERIA UR CULT: ABNORMAL
BACTERIA UR CULT: ABNORMAL
BILIRUB UR QL STRIP: NEGATIVE
COLOR UR: YELLOW
GLUCOSE UR QL: NEGATIVE
HGB UR QL STRIP: NEGATIVE
KETONES UR QL STRIP: NEGATIVE
LEUKOCYTE ESTERASE UR QL STRIP: NEGATIVE
MICRO URNS: NORMAL
NITRITE UR QL STRIP: NEGATIVE
OTHER ANTIBIOTIC SUSC ISLT: ABNORMAL
PH UR STRIP: 5.5 [PH] (ref 5–7.5)
PROT UR QL STRIP: NORMAL
SP GR UR: 1.03 (ref 1–1.03)
URATE SERPL-MCNC: 3.9 MG/DL (ref 2.5–7.1)
UROBILINOGEN UR STRIP-MCNC: 0.2 MG/DL (ref 0.2–1)

## 2020-06-18 DIAGNOSIS — M06.9 RHEUMATOID ARTHRITIS OF HAND, UNSPECIFIED LATERALITY, UNSPECIFIED RHEUMATOID FACTOR PRESENCE: ICD-10-CM

## 2020-06-18 DIAGNOSIS — M16.0 PRIMARY OSTEOARTHRITIS OF BOTH HIPS: ICD-10-CM

## 2020-06-22 RX ORDER — IBUPROFEN AND FAMOTIDINE 800; 26.6 MG/1; MG/1
1 TABLET, COATED ORAL 3 TIMES DAILY PRN
Qty: 90 TABLET | Refills: 3 | Status: SHIPPED | OUTPATIENT
Start: 2020-06-22 | End: 2020-07-07 | Stop reason: SDUPTHER

## 2020-06-23 DIAGNOSIS — M81.0 OSTEOPOROSIS, UNSPECIFIED OSTEOPOROSIS TYPE, UNSPECIFIED PATHOLOGICAL FRACTURE PRESENCE: ICD-10-CM

## 2020-06-23 DIAGNOSIS — K21.00 GASTROESOPHAGEAL REFLUX DISEASE WITH ESOPHAGITIS: ICD-10-CM

## 2020-06-23 DIAGNOSIS — M16.0 PRIMARY OSTEOARTHRITIS OF BOTH HIPS: ICD-10-CM

## 2020-06-23 NOTE — TELEPHONE ENCOUNTER
----- Message from Gisele Roe sent at 6/23/2020  9:44 AM CDT -----  Regarding: refill  Contact: CHRISTOFER TORREZ [[id number]]  CHRISTOFER TORREZ [[id number]]    Contact # [phone]     Patient requesting a refill on DEXILANT.    Patient will be using [facility] # [phone].     Thanks!

## 2020-06-24 RX ORDER — DEXLANSOPRAZOLE 60 MG/1
60 CAPSULE, DELAYED RELEASE ORAL DAILY
Qty: 30 CAPSULE | Refills: 3 | Status: SHIPPED | OUTPATIENT
Start: 2020-06-24 | End: 2021-03-05 | Stop reason: SDUPTHER

## 2020-07-01 DIAGNOSIS — R21 RASH: ICD-10-CM

## 2020-07-02 RX ORDER — CLOTRIMAZOLE AND BETAMETHASONE DIPROPIONATE 10; .64 MG/G; MG/G
CREAM TOPICAL 2 TIMES DAILY
Qty: 45 G | Refills: 3 | Status: SHIPPED | OUTPATIENT
Start: 2020-07-02 | End: 2021-10-04 | Stop reason: SDUPTHER

## 2020-07-07 ENCOUNTER — TELEPHONE (OUTPATIENT)
Dept: RHEUMATOLOGY | Facility: CLINIC | Age: 65
End: 2020-07-07

## 2020-07-07 DIAGNOSIS — D72.829 LEUKOCYTOSIS, UNSPECIFIED TYPE: ICD-10-CM

## 2020-07-07 DIAGNOSIS — M06.9 RHEUMATOID ARTHRITIS OF HAND, UNSPECIFIED LATERALITY, UNSPECIFIED RHEUMATOID FACTOR PRESENCE: Primary | ICD-10-CM

## 2020-07-07 DIAGNOSIS — M81.0 OSTEOPOROSIS, UNSPECIFIED OSTEOPOROSIS TYPE, UNSPECIFIED PATHOLOGICAL FRACTURE PRESENCE: ICD-10-CM

## 2020-07-07 DIAGNOSIS — M16.0 PRIMARY OSTEOARTHRITIS OF BOTH HIPS: ICD-10-CM

## 2020-07-07 DIAGNOSIS — K21.00 GASTROESOPHAGEAL REFLUX DISEASE WITH ESOPHAGITIS: ICD-10-CM

## 2020-07-07 RX ORDER — AMLODIPINE BESYLATE 5 MG/1
5 TABLET ORAL DAILY
Qty: 30 TABLET | Refills: 11 | Status: SHIPPED | OUTPATIENT
Start: 2020-07-07 | End: 2021-07-29 | Stop reason: SDUPTHER

## 2020-07-07 RX ORDER — RISEDRONATE SODIUM 30.1; 4.9 MG/1; MG/1
1 TABLET, DELAYED RELEASE ORAL
Qty: 4 TABLET | Refills: 6 | Status: SHIPPED | OUTPATIENT
Start: 2020-07-07 | End: 2022-02-04 | Stop reason: SDUPTHER

## 2020-07-07 RX ORDER — HYDROXYCHLOROQUINE SULFATE 200 MG/1
200 TABLET, FILM COATED ORAL 2 TIMES DAILY
Qty: 60 TABLET | Refills: 11 | Status: SHIPPED | OUTPATIENT
Start: 2020-07-07 | End: 2020-11-27 | Stop reason: SDUPTHER

## 2020-07-07 RX ORDER — IBUPROFEN AND FAMOTIDINE 800; 26.6 MG/1; MG/1
1 TABLET, COATED ORAL 3 TIMES DAILY PRN
Qty: 90 TABLET | Refills: 3 | Status: SHIPPED | OUTPATIENT
Start: 2020-07-07 | End: 2020-11-27 | Stop reason: SDUPTHER

## 2020-07-09 ENCOUNTER — TELEPHONE (OUTPATIENT)
Dept: RHEUMATOLOGY | Facility: CLINIC | Age: 65
End: 2020-07-09

## 2020-07-09 DIAGNOSIS — M54.2 CERVICALGIA: ICD-10-CM

## 2020-07-09 DIAGNOSIS — M48.02 SPINAL STENOSIS, CERVICAL REGION: ICD-10-CM

## 2020-07-09 NOTE — TELEPHONE ENCOUNTER
Pt reports had to go to ED in [place] for neck pain and unable to turn neck for 3 days. She reports cervical xray indicated degenerative detieriation and advised to see ortho and have MRI. She is still in [place], [place] and would like referrals for near there. She reports she is in significant pain and would like done as soon as possible.

## 2020-07-09 NOTE — TELEPHONE ENCOUNTER
----- Message from Estlela Berman sent at 7/9/2020 10:09 AM CDT -----  Regarding: Need medical advice  Contact: pt  Type: Need medical advice      Who Called:  pt  Best Call Back Number:  [phone]  Additional Information:   Pt would like a call back, pt said she have a pain in her neck and had to go to the ED last night

## 2020-07-12 NOTE — TELEPHONE ENCOUNTER
She already had a bone density scan MRI please however she has not come in town my understanding is until November and she also lives in the [place] area so her test may need to be scheduled in [place]

## 2020-07-14 NOTE — TELEPHONE ENCOUNTER
Spoke with pt, advised that we have order for MRI cervical. She advises it has been more than 2 years on the Bone density. She advises the she is set up to see a PCP on 7/25 and she will get the information for us to fax orders over there. Pt will be having her bloodwork done and ask that as soon as we have results to please call with advisement.

## 2020-07-17 LAB
25(OH)D3+25(OH)D2 SERPL-MCNC: 28.2 NG/ML (ref 30–100)
ALBUMIN SERPL-MCNC: 4 G/DL (ref 3.8–4.8)
ALBUMIN/GLOB SERPL: 1.5 {RATIO} (ref 1.2–2.2)
ALP SERPL-CCNC: 124 IU/L (ref 39–117)
ALT SERPL-CCNC: 10 IU/L (ref 0–32)
ANA TITR SER IF: NEGATIVE {TITER}
AST SERPL-CCNC: 13 IU/L (ref 0–40)
BASOPHILS # BLD AUTO: 0.1 X10E3/UL (ref 0–0.2)
BASOPHILS NFR BLD AUTO: 1 %
BILIRUB SERPL-MCNC: 0.2 MG/DL (ref 0–1.2)
BUN SERPL-MCNC: 17 MG/DL (ref 8–27)
BUN/CREAT SERPL: 23 (ref 12–28)
CALCIUM SERPL-MCNC: 9.7 MG/DL (ref 8.7–10.3)
CCP IGA+IGG SERPL IA-ACNC: 5 UNITS (ref 0–19)
CHLORIDE SERPL-SCNC: 106 MMOL/L (ref 96–106)
CO2 SERPL-SCNC: 27 MMOL/L (ref 20–29)
CREAT SERPL-MCNC: 0.75 MG/DL (ref 0.57–1)
CRP SERPL-MCNC: 5 MG/L (ref 0–10)
DSDNA AB SER-ACNC: <1 IU/ML (ref 0–9)
EOSINOPHIL # BLD AUTO: 0.4 X10E3/UL (ref 0–0.4)
EOSINOPHIL NFR BLD AUTO: 3 %
ERYTHROCYTE [DISTWIDTH] IN BLOOD BY AUTOMATED COUNT: 13.2 % (ref 11.7–15.4)
ERYTHROCYTE [SEDIMENTATION RATE] IN BLOOD BY WESTERGREN METHOD: 21 MM/HR (ref 0–40)
GLOBULIN SER CALC-MCNC: 2.7 G/DL (ref 1.5–4.5)
GLUCOSE SERPL-MCNC: 85 MG/DL (ref 65–99)
HBA1C MFR BLD: 5.6 % (ref 4.8–5.6)
HCT VFR BLD AUTO: 43.4 % (ref 34–46.6)
HGB BLD-MCNC: 14.2 G/DL (ref 11.1–15.9)
IMM GRANULOCYTES # BLD AUTO: 0.1 X10E3/UL (ref 0–0.1)
IMM GRANULOCYTES NFR BLD AUTO: 1 %
LABORATORY COMMENT REPORT: NORMAL
LYMPHOCYTES # BLD AUTO: 4.1 X10E3/UL (ref 0.7–3.1)
LYMPHOCYTES NFR BLD AUTO: 29 %
MCH RBC QN AUTO: 30.1 PG (ref 26.6–33)
MCHC RBC AUTO-ENTMCNC: 32.7 G/DL (ref 31.5–35.7)
MCV RBC AUTO: 92 FL (ref 79–97)
MONOCYTES # BLD AUTO: 1.1 X10E3/UL (ref 0.1–0.9)
MONOCYTES NFR BLD AUTO: 8 %
NEUTROPHILS # BLD AUTO: 8.5 X10E3/UL (ref 1.4–7)
NEUTROPHILS NFR BLD AUTO: 58 %
PLATELET # BLD AUTO: 271 X10E3/UL (ref 150–450)
POTASSIUM SERPL-SCNC: 4.1 MMOL/L (ref 3.5–5.2)
PROT SERPL-MCNC: 6.7 G/DL (ref 6–8.5)
RBC # BLD AUTO: 4.71 X10E6/UL (ref 3.77–5.28)
RHEUMATOID FACT SERPL-ACNC: 19.9 IU/ML (ref 0–13.9)
SODIUM SERPL-SCNC: 147 MMOL/L (ref 134–144)
T3FREE SERPL-MCNC: 3.1 PG/ML (ref 2–4.4)
T4 FREE SERPL-MCNC: 1.24 NG/DL (ref 0.82–1.77)
TSH SERPL DL<=0.005 MIU/L-ACNC: 3.17 UIU/ML (ref 0.45–4.5)
WBC # BLD AUTO: 14.2 X10E3/UL (ref 3.4–10.8)

## 2020-11-16 ENCOUNTER — OFFICE VISIT (OUTPATIENT)
Dept: RHEUMATOLOGY | Facility: CLINIC | Age: 65
End: 2020-11-16

## 2020-11-16 DIAGNOSIS — M54.2 CERVICALGIA: ICD-10-CM

## 2020-11-16 DIAGNOSIS — M48.02 SPINAL STENOSIS, CERVICAL REGION: ICD-10-CM

## 2020-11-16 DIAGNOSIS — M81.0 OSTEOPOROSIS, UNSPECIFIED OSTEOPOROSIS TYPE, UNSPECIFIED PATHOLOGICAL FRACTURE PRESENCE: ICD-10-CM

## 2020-11-16 DIAGNOSIS — K21.00 GASTROESOPHAGEAL REFLUX DISEASE WITH ESOPHAGITIS WITHOUT HEMORRHAGE: ICD-10-CM

## 2020-11-16 DIAGNOSIS — M16.0 PRIMARY OSTEOARTHRITIS OF BOTH HIPS: ICD-10-CM

## 2020-11-16 DIAGNOSIS — M06.9 RHEUMATOID ARTHRITIS FLARE: Primary | ICD-10-CM

## 2020-11-16 PROCEDURE — 99214 PR OFFICE/OUTPT VISIT, EST, LEVL IV, 30-39 MIN: ICD-10-PCS | Mod: 95,,, | Performed by: INTERNAL MEDICINE

## 2020-11-16 PROCEDURE — 99214 OFFICE O/P EST MOD 30 MIN: CPT | Mod: 95,,, | Performed by: INTERNAL MEDICINE

## 2020-11-16 RX ORDER — IBUPROFEN AND FAMOTIDINE 800; 26.6 MG/1; MG/1
1 TABLET, COATED ORAL 3 TIMES DAILY PRN
Qty: 90 TABLET | Refills: 12 | Status: CANCELLED | OUTPATIENT
Start: 2020-11-16

## 2020-11-16 RX ORDER — HYDROXYCHLOROQUINE SULFATE 200 MG/1
200 TABLET, FILM COATED ORAL 2 TIMES DAILY
Qty: 60 TABLET | Refills: 11 | Status: CANCELLED | OUTPATIENT
Start: 2020-11-16

## 2020-11-16 RX ORDER — PREDNISONE 5 MG/1
TABLET ORAL
Qty: 90 TABLET | Refills: 6 | Status: CANCELLED | OUTPATIENT
Start: 2020-11-16

## 2020-11-27 RX ORDER — HYDROXYCHLOROQUINE SULFATE 200 MG/1
200 TABLET, FILM COATED ORAL 2 TIMES DAILY
Qty: 60 TABLET | Refills: 11 | Status: SHIPPED | OUTPATIENT
Start: 2020-11-27 | End: 2021-09-24 | Stop reason: SDUPTHER

## 2020-11-27 RX ORDER — PREDNISONE 5 MG/1
TABLET ORAL
Qty: 90 TABLET | Refills: 6 | Status: SHIPPED | OUTPATIENT
Start: 2020-11-27 | End: 2022-10-28

## 2020-11-27 RX ORDER — IBUPROFEN AND FAMOTIDINE 800; 26.6 MG/1; MG/1
1 TABLET, COATED ORAL 3 TIMES DAILY PRN
Qty: 90 TABLET | Refills: 12 | Status: SHIPPED | OUTPATIENT
Start: 2020-11-27 | End: 2021-09-24 | Stop reason: SDUPTHER

## 2020-11-27 NOTE — PROGRESS NOTES
Subjective:       Patient ID: Sobeida Valencia is a 65 y.o. female.    Chief Complaint: No chief complaint on file.       Rheumatoid arthritis   she has  RA on duexis and plaquenil, patient [place] rheumatologist [place] was taken off of her Plaquenil she ended up in the ER in pain now she has restarted her treatment Pain is located in multiple joints, both shoulder(s), both elbow(s), both wrist(s), both MCP(s): 1st, 2nd, 3rd, 4th and 5th, both PIP(s): 1st, 2nd, 3rd, 4th and 5th, both DIP(s): 1st and 2nd, both hip(s), both knee(s) and both MTP(s): 1st, 2nd, 3rd, 4th and 5th, is described as aching, pulsating, shooting and throbbing, and is constant, moderate .  Associated symptoms include: crepitation, decreased range of motion, edema, effusion, tenderness and warmth.                  She complains of joint swelling. Associated symptoms include fatigue and myalgias. Pertinent negatives include no dysuria, fever, trouble swallowing or headaches.         Review of Systems   Constitutional: Positive for activity change and fatigue. Negative for appetite change, chills, diaphoresis, fever and unexpected weight change.   HENT: Negative for congestion, dental problem, ear discharge, ear pain, facial swelling, mouth sores, nosebleeds, postnasal drip, rhinorrhea, sinus pressure, sneezing, sore throat, tinnitus, trouble swallowing and voice change.    Eyes: Negative for photophobia, pain, discharge, redness and itching.   Respiratory: Negative for apnea, cough, chest tightness, shortness of breath and wheezing.    Cardiovascular: Positive for leg swelling. Negative for chest pain and palpitations.   Gastrointestinal: Positive for abdominal pain. Negative for abdominal distention, constipation, diarrhea, nausea and vomiting.   Endocrine: Negative for cold intolerance, heat intolerance, polydipsia and polyuria.   Genitourinary: Negative for decreased urine volume, difficulty urinating, dysuria, flank pain, frequency,  hematuria and urgency.   Musculoskeletal: Positive for arthralgias, back pain, gait problem, joint swelling, myalgias, neck pain and neck stiffness.   Skin: Negative for pallor, rash and wound.   Allergic/Immunologic: Negative for immunocompromised state.   Neurological: Positive for weakness. Negative for dizziness, tremors, numbness and headaches.   Hematological: Negative for adenopathy. Does not bruise/bleed easily.   Psychiatric/Behavioral: Negative for sleep disturbance. The patient is not nervous/anxious.          Objective:     There were no vitals taken for this visit.     Physical Exam   Constitutional: She is oriented to person, place, and time and well-developed, well-nourished, and in no distress.   Neurological: She is alert and oriented to person, place, and time.   Psychiatric: Mood, affect and judgment normal.        Results for orders placed or performed in visit on 07/07/20   DEBO by IFA, w/Rflx   Result Value Ref Range    DEBO Negative     Please Note Comment    CBC auto differential   Result Value Ref Range    WBC 14.2 (H) 3.4 - 10.8 x10E3/uL    RBC 4.71 3.77 - 5.28 x10E6/uL    Hemoglobin 14.2 11.1 - 15.9 g/dL    Hematocrit 43.4 34.0 - 46.6 %    MCV 92 79 - 97 fL    MCH 30.1 26.6 - 33.0 pg    MCHC 32.7 31.5 - 35.7 g/dL    RDW 13.2 11.7 - 15.4 %    Platelets 271 150 - 450 x10E3/uL    Neutrophils 58 Not Estab. %    Lymph % 29 Not Estab. %    Mono % 8 Not Estab. %    Eosinophil % 3 Not Estab. %    Basophil % 1 Not Estab. %    Neutrophils Absolute 8.5 (H) 1.4 - 7.0 x10E3/uL    Lymph # 4.1 (H) 0.7 - 3.1 x10E3/uL    Mono # 1.1 (H) 0.1 - 0.9 x10E3/uL    Eos # 0.4 0.0 - 0.4 x10E3/uL    Baso # 0.1 0.0 - 0.2 x10E3/uL    Immature Granulocytes 1 Not Estab. %    Immature Grans (Abs) 0.1 0.0 - 0.1 x10E3/uL   Comprehensive metabolic panel   Result Value Ref Range    Glucose 85 65 - 99 mg/dL    BUN 17 8 - 27 mg/dL    Creatinine 0.75 0.57 - 1.00 mg/dL    eGFR if non African American 84 >59 mL/min/1.73    eGFR if   97 >59 mL/min/1.73    BUN/Creatinine Ratio 23 12 - 28    Sodium 147 (H) 134 - 144 mmol/L    Potassium 4.1 3.5 - 5.2 mmol/L    Chloride 106 96 - 106 mmol/L    CO2 27 20 - 29 mmol/L    Calcium 9.7 8.7 - 10.3 mg/dL    Total Protein 6.7 6.0 - 8.5 g/dL    Albumin 4.0 3.8 - 4.8 g/dL    Globulin, Total 2.7 1.5 - 4.5 g/dL    Albumin/Globulin Ratio 1.5 1.2 - 2.2    Total Bilirubin 0.2 0.0 - 1.2 mg/dL    Alkaline Phosphatase 124 (H) 39 - 117 IU/L    AST 13 0 - 40 IU/L    ALT 10 0 - 32 IU/L   Hemoglobin A1C   Result Value Ref Range    Hemoglobin A1C 5.6 4.8 - 5.6 %   T4, free   Result Value Ref Range    T4, Free 1.24 0.82 - 1.77 ng/dL   TSH   Result Value Ref Range    TSH 3.170 0.450 - 4.500 uIU/mL   Rheumatoid factor   Result Value Ref Range    Rheumatoid Factor 19.9 (H) 0.0 - 13.9 IU/mL   Vitamin D   Result Value Ref Range    Vit D, 25-Hydroxy 28.2 (L) 30.0 - 100.0 ng/mL   Anti-DNA antibody, double-stranded   Result Value Ref Range    ds DNA Ab <1 0 - 9 IU/mL   Cyclic Citrullinated Peptide Antibody, IgG   Result Value Ref Range    CCP Antibodies IgG/IgA 5 0 - 19 units   Sedimentation rate, automated   Result Value Ref Range    Sed Rate 21 0 - 40 mm/hr   C-Reactive Protein   Result Value Ref Range    CRP 5 0 - 10 mg/L   T3, free   Result Value Ref Range    T3, Free 3.1 2.0 - 4.4 pg/mL       reviewed labs with patient during this visit           Assessment:       Encounter Diagnoses   Name Primary?    Rheumatoid arthritis flare Yes    Spinal stenosis, cervical region     Primary osteoarthritis of both hips     Cervicalgia     Osteoporosis, unspecified osteoporosis type, unspecified pathological fracture presence     Gastroesophageal reflux disease with esophagitis     Rheumatoid arthritis of hand          Plan:     Diagnoses and all orders for this visit:    Rheumatoid arthritis flare  -     CBC Auto Differential; Future  -     Comprehensive Metabolic Panel; Future  -     C-Reactive Protein; Future  -      Sedimentation rate; Future  -     Rheumatoid Factor; Future  -     Sjogrens syndrome-A extractable nuclear antibody; Future  -     Sjogrens syndrome-B extractable nuclear antibody; Future    Spinal stenosis, cervical region  -     CBC Auto Differential; Future  -     Comprehensive Metabolic Panel; Future  -     C-Reactive Protein; Future  -     Sedimentation rate; Future  -     Rheumatoid Factor; Future  -     Sjogrens syndrome-A extractable nuclear antibody; Future  -     Sjogrens syndrome-B extractable nuclear antibody; Future    Primary osteoarthritis of both hips  -     CBC Auto Differential; Future  -     Comprehensive Metabolic Panel; Future  -     C-Reactive Protein; Future  -     Sedimentation rate; Future  -     Rheumatoid Factor; Future  -     Sjogrens syndrome-A extractable nuclear antibody; Future  -     Sjogrens syndrome-B extractable nuclear antibody; Future  -     predniSONE (DELTASONE) 5 MG tablet; 1 tab to 3 tab po in prn swelling  -     ibuprofen-famotidine (DUEXIS) 800-26.6 mg Tab; Take 1 tablet by mouth 3 (three) times daily as needed.  -     hydrOXYchloroQUINE (PLAQUENIL) 200 mg tablet; Take 1 tablet (200 mg total) by mouth 2 (two) times daily.    Cervicalgia  -     CBC Auto Differential; Future  -     Comprehensive Metabolic Panel; Future  -     C-Reactive Protein; Future  -     Sedimentation rate; Future  -     Rheumatoid Factor; Future  -     Sjogrens syndrome-A extractable nuclear antibody; Future  -     Sjogrens syndrome-B extractable nuclear antibody; Future    Osteoporosis, unspecified osteoporosis type, unspecified pathological fracture presence  -     predniSONE (DELTASONE) 5 MG tablet; 1 tab to 3 tab po in prn swelling  -     hydrOXYchloroQUINE (PLAQUENIL) 200 mg tablet; Take 1 tablet (200 mg total) by mouth 2 (two) times daily.    Gastroesophageal reflux disease with esophagitis  -     predniSONE (DELTASONE) 5 MG tablet; 1 tab to 3 tab po in prn swelling  -     hydrOXYchloroQUINE  (PLAQUENIL) 200 mg tablet; Take 1 tablet (200 mg total) by mouth 2 (two) times daily.    Rheumatoid arthritis of hand  -     predniSONE (DELTASONE) 5 MG tablet; 1 tab to 3 tab po in prn swelling  -     ibuprofen-famotidine (DUEXIS) 800-26.6 mg Tab; Take 1 tablet by mouth 3 (three) times daily as needed.  -     hydrOXYchloroQUINE (PLAQUENIL) 200 mg tablet; Take 1 tablet (200 mg total) by mouth 2 (two) times daily.    Other orders  -     Cancel: predniSONE (DELTASONE) 5 MG tablet; 1 tab to 3 tab po in prn swelling  -     Cancel: hydrOXYchloroQUINE (PLAQUENIL) 200 mg tablet; Take 1 tablet (200 mg total) by mouth 2 (two) times daily.  -     Cancel: ibuprofen-famotidine (DUEXIS) 800-26.6 mg Tab; Take 1 tablet by mouth 3 (three) times daily as needed.    Plaquenil for ra slow progression   prednisone only when bad    The patient location is: home  The chief complaint leading to consultation is: ra    Visit type: audiovisual    Face to Face time with patient: 25   minutes of total time spent on the encounter, which includes face to face time and non-face to face time preparing to see the patient (eg, review of tests), Obtaining and/or reviewing separately obtained history, Documenting clinical information in the electronic or other health record, Independently interpreting results (not separately reported) and communicating results to the patient/family/caregiver, or Care coordination (not separately reported).         Each patient to whom he or she provides medical services by telemedicine is:  (1) informed of the relationship between the physician and patient and the respective role of any other health care provider with respect to management of the patient; and (2) notified that he or she may decline to receive medical services by telemedicine and may withdraw from such care at any time.    Notes:

## 2021-03-05 DIAGNOSIS — K21.00 GASTROESOPHAGEAL REFLUX DISEASE WITH ESOPHAGITIS: ICD-10-CM

## 2021-03-05 RX ORDER — DEXLANSOPRAZOLE 60 MG/1
60 CAPSULE, DELAYED RELEASE ORAL DAILY
Qty: 30 CAPSULE | Refills: 3 | Status: SHIPPED | OUTPATIENT
Start: 2021-03-05 | End: 2021-09-03 | Stop reason: SDUPTHER

## 2021-03-05 RX ORDER — DEXLANSOPRAZOLE 60 MG/1
60 CAPSULE, DELAYED RELEASE ORAL DAILY
Qty: 30 CAPSULE | Refills: 3 | Status: CANCELLED | OUTPATIENT
Start: 2021-03-05 | End: 2021-07-03

## 2021-07-01 ENCOUNTER — PATIENT MESSAGE (OUTPATIENT)
Dept: ADMINISTRATIVE | Facility: OTHER | Age: 66
End: 2021-07-01

## 2021-07-29 DIAGNOSIS — M81.0 OSTEOPOROSIS, UNSPECIFIED OSTEOPOROSIS TYPE, UNSPECIFIED PATHOLOGICAL FRACTURE PRESENCE: ICD-10-CM

## 2021-07-29 DIAGNOSIS — M16.0 PRIMARY OSTEOARTHRITIS OF BOTH HIPS: ICD-10-CM

## 2021-07-29 DIAGNOSIS — K21.00 GASTROESOPHAGEAL REFLUX DISEASE WITH ESOPHAGITIS: ICD-10-CM

## 2021-07-29 DIAGNOSIS — M06.9 RHEUMATOID ARTHRITIS OF HAND: ICD-10-CM

## 2021-07-29 DIAGNOSIS — I10 HYPERTENSION, UNSPECIFIED TYPE: Primary | ICD-10-CM

## 2021-08-03 RX ORDER — AMLODIPINE BESYLATE 5 MG/1
5 TABLET ORAL DAILY
Qty: 30 TABLET | Refills: 5 | Status: SHIPPED | OUTPATIENT
Start: 2021-08-03 | End: 2022-01-28 | Stop reason: SDUPTHER

## 2021-09-03 ENCOUNTER — PATIENT MESSAGE (OUTPATIENT)
Dept: RHEUMATOLOGY | Facility: CLINIC | Age: 66
End: 2021-09-03

## 2021-09-03 RX ORDER — DEXLANSOPRAZOLE 60 MG/1
60 CAPSULE, DELAYED RELEASE ORAL DAILY
Qty: 30 CAPSULE | Refills: 1 | Status: SHIPPED | OUTPATIENT
Start: 2021-09-03 | End: 2021-09-24 | Stop reason: SDUPTHER

## 2021-09-07 ENCOUNTER — TELEPHONE (OUTPATIENT)
Dept: RHEUMATOLOGY | Facility: CLINIC | Age: 66
End: 2021-09-07

## 2021-09-24 ENCOUNTER — OFFICE VISIT (OUTPATIENT)
Dept: RHEUMATOLOGY | Facility: CLINIC | Age: 66
End: 2021-09-24
Payer: MEDICARE

## 2021-09-24 VITALS
HEART RATE: 59 BPM | SYSTOLIC BLOOD PRESSURE: 156 MMHG | WEIGHT: 205 LBS | BODY MASS INDEX: 36.32 KG/M2 | HEIGHT: 63 IN | DIASTOLIC BLOOD PRESSURE: 80 MMHG

## 2021-09-24 DIAGNOSIS — M54.2 CERVICALGIA: ICD-10-CM

## 2021-09-24 DIAGNOSIS — M16.0 PRIMARY OSTEOARTHRITIS OF BOTH HIPS: ICD-10-CM

## 2021-09-24 DIAGNOSIS — R94.6 ABNORMAL RESULTS OF THYROID FUNCTION STUDIES: ICD-10-CM

## 2021-09-24 DIAGNOSIS — K21.00 GASTROESOPHAGEAL REFLUX DISEASE WITH ESOPHAGITIS, UNSPECIFIED WHETHER HEMORRHAGE: ICD-10-CM

## 2021-09-24 DIAGNOSIS — M06.9 RHEUMATOID ARTHRITIS FLARE: ICD-10-CM

## 2021-09-24 DIAGNOSIS — M81.0 OSTEOPOROSIS, UNSPECIFIED OSTEOPOROSIS TYPE, UNSPECIFIED PATHOLOGICAL FRACTURE PRESENCE: ICD-10-CM

## 2021-09-24 DIAGNOSIS — M47.817 LUMBAR AND SACRAL OSTEOARTHRITIS: Primary | ICD-10-CM

## 2021-09-24 DIAGNOSIS — K21.00 GASTROESOPHAGEAL REFLUX DISEASE WITH ESOPHAGITIS WITHOUT HEMORRHAGE: ICD-10-CM

## 2021-09-24 PROCEDURE — 99999 PR PBB SHADOW E&M-EST. PATIENT-LVL III: ICD-10-PCS | Mod: PBBFAC,,, | Performed by: INTERNAL MEDICINE

## 2021-09-24 PROCEDURE — 1125F PR PAIN SEVERITY QUANTIFIED, PAIN PRESENT: ICD-10-PCS | Mod: CPTII,S$GLB,, | Performed by: INTERNAL MEDICINE

## 2021-09-24 PROCEDURE — 96372 THER/PROPH/DIAG INJ SC/IM: CPT | Mod: S$GLB,,, | Performed by: INTERNAL MEDICINE

## 2021-09-24 PROCEDURE — 1159F PR MEDICATION LIST DOCUMENTED IN MEDICAL RECORD: ICD-10-PCS | Mod: CPTII,S$GLB,, | Performed by: INTERNAL MEDICINE

## 2021-09-24 PROCEDURE — 3079F PR MOST RECENT DIASTOLIC BLOOD PRESSURE 80-89 MM HG: ICD-10-PCS | Mod: CPTII,S$GLB,, | Performed by: INTERNAL MEDICINE

## 2021-09-24 PROCEDURE — 3008F BODY MASS INDEX DOCD: CPT | Mod: CPTII,S$GLB,, | Performed by: INTERNAL MEDICINE

## 2021-09-24 PROCEDURE — 3077F SYST BP >= 140 MM HG: CPT | Mod: CPTII,S$GLB,, | Performed by: INTERNAL MEDICINE

## 2021-09-24 PROCEDURE — 1159F MED LIST DOCD IN RCRD: CPT | Mod: CPTII,S$GLB,, | Performed by: INTERNAL MEDICINE

## 2021-09-24 PROCEDURE — 3008F PR BODY MASS INDEX (BMI) DOCUMENTED: ICD-10-PCS | Mod: CPTII,S$GLB,, | Performed by: INTERNAL MEDICINE

## 2021-09-24 PROCEDURE — 3079F DIAST BP 80-89 MM HG: CPT | Mod: CPTII,S$GLB,, | Performed by: INTERNAL MEDICINE

## 2021-09-24 PROCEDURE — 3288F PR FALLS RISK ASSESSMENT DOCUMENTED: ICD-10-PCS | Mod: CPTII,S$GLB,, | Performed by: INTERNAL MEDICINE

## 2021-09-24 PROCEDURE — 1101F PR PT FALLS ASSESS DOC 0-1 FALLS W/OUT INJ PAST YR: ICD-10-PCS | Mod: CPTII,S$GLB,, | Performed by: INTERNAL MEDICINE

## 2021-09-24 PROCEDURE — 3077F PR MOST RECENT SYSTOLIC BLOOD PRESSURE >= 140 MM HG: ICD-10-PCS | Mod: CPTII,S$GLB,, | Performed by: INTERNAL MEDICINE

## 2021-09-24 PROCEDURE — 99999 PR PBB SHADOW E&M-EST. PATIENT-LVL III: CPT | Mod: PBBFAC,,, | Performed by: INTERNAL MEDICINE

## 2021-09-24 PROCEDURE — 96372 PR INJECTION,THERAP/PROPH/DIAG2ST, IM OR SUBCUT: ICD-10-PCS | Mod: S$GLB,,, | Performed by: INTERNAL MEDICINE

## 2021-09-24 PROCEDURE — 3288F FALL RISK ASSESSMENT DOCD: CPT | Mod: CPTII,S$GLB,, | Performed by: INTERNAL MEDICINE

## 2021-09-24 PROCEDURE — 1101F PT FALLS ASSESS-DOCD LE1/YR: CPT | Mod: CPTII,S$GLB,, | Performed by: INTERNAL MEDICINE

## 2021-09-24 PROCEDURE — 1125F AMNT PAIN NOTED PAIN PRSNT: CPT | Mod: CPTII,S$GLB,, | Performed by: INTERNAL MEDICINE

## 2021-09-24 PROCEDURE — 99214 OFFICE O/P EST MOD 30 MIN: CPT | Mod: 25,S$GLB,, | Performed by: INTERNAL MEDICINE

## 2021-09-24 PROCEDURE — 99214 PR OFFICE/OUTPT VISIT, EST, LEVL IV, 30-39 MIN: ICD-10-PCS | Mod: 25,S$GLB,, | Performed by: INTERNAL MEDICINE

## 2021-09-24 RX ORDER — HYDROXYCHLOROQUINE SULFATE 200 MG/1
200 TABLET, FILM COATED ORAL 2 TIMES DAILY
Qty: 180 TABLET | Refills: 3 | Status: SHIPPED | OUTPATIENT
Start: 2021-09-24 | End: 2022-11-02

## 2021-09-24 RX ORDER — DULOXETIN HYDROCHLORIDE 30 MG/1
30 CAPSULE, DELAYED RELEASE ORAL DAILY
Qty: 90 CAPSULE | Refills: 3 | Status: SHIPPED | OUTPATIENT
Start: 2021-09-24 | End: 2022-01-28

## 2021-09-24 RX ORDER — IBUPROFEN AND FAMOTIDINE 26.6; 8 MG/1; MG/1
1 TABLET ORAL 3 TIMES DAILY PRN
Qty: 90 TABLET | Refills: 12 | Status: SHIPPED | OUTPATIENT
Start: 2021-09-24 | End: 2022-01-28 | Stop reason: SDUPTHER

## 2021-09-24 RX ORDER — KETOROLAC TROMETHAMINE 30 MG/ML
60 INJECTION, SOLUTION INTRAMUSCULAR; INTRAVENOUS
Status: COMPLETED | OUTPATIENT
Start: 2021-09-24 | End: 2021-09-24

## 2021-09-24 RX ORDER — DICLOFENAC SODIUM AND MISOPROSTOL 75; 200 MG/1; UG/1
1 TABLET, DELAYED RELEASE ORAL 2 TIMES DAILY
Qty: 60 TABLET | Refills: 11 | Status: SHIPPED | OUTPATIENT
Start: 2021-09-24 | End: 2022-01-28

## 2021-09-24 RX ORDER — DEXLANSOPRAZOLE 60 MG/1
60 CAPSULE, DELAYED RELEASE ORAL DAILY
Qty: 90 CAPSULE | Refills: 3 | Status: SHIPPED | OUTPATIENT
Start: 2021-09-24 | End: 2022-05-27 | Stop reason: SDUPTHER

## 2021-09-24 RX ORDER — GABAPENTIN 100 MG/1
100 CAPSULE ORAL 3 TIMES DAILY
Qty: 90 CAPSULE | Refills: 11 | Status: SHIPPED | OUTPATIENT
Start: 2021-09-24 | End: 2022-11-18

## 2021-09-24 RX ADMIN — KETOROLAC TROMETHAMINE 60 MG: 30 INJECTION, SOLUTION INTRAMUSCULAR; INTRAVENOUS at 10:09

## 2021-09-24 ASSESSMENT — ROUTINE ASSESSMENT OF PATIENT INDEX DATA (RAPID3)
TOTAL RAPID3 SCORE: 3.56
FATIGUE SCORE: 2.2
PATIENT GLOBAL ASSESSMENT SCORE: 3
PAIN SCORE: 6
MDHAQ FUNCTION SCORE: 0.5
PSYCHOLOGICAL DISTRESS SCORE: 0

## 2021-10-04 DIAGNOSIS — R21 RASH: ICD-10-CM

## 2021-10-06 RX ORDER — CLOTRIMAZOLE AND BETAMETHASONE DIPROPIONATE 10; .64 MG/G; MG/G
CREAM TOPICAL 2 TIMES DAILY
Qty: 45 G | Refills: 3 | Status: SHIPPED | OUTPATIENT
Start: 2021-10-06 | End: 2022-05-10 | Stop reason: SDUPTHER

## 2021-11-08 DIAGNOSIS — M16.0 PRIMARY OSTEOARTHRITIS OF BOTH HIPS: Primary | ICD-10-CM

## 2021-11-08 DIAGNOSIS — M47.817 LUMBAR AND SACRAL OSTEOARTHRITIS: ICD-10-CM

## 2021-11-08 DIAGNOSIS — M06.9 RHEUMATOID ARTHRITIS FLARE: ICD-10-CM

## 2021-12-14 ENCOUNTER — TELEPHONE (OUTPATIENT)
Dept: RHEUMATOLOGY | Facility: CLINIC | Age: 66
End: 2021-12-14
Payer: MEDICARE

## 2022-01-28 ENCOUNTER — OFFICE VISIT (OUTPATIENT)
Dept: RHEUMATOLOGY | Facility: CLINIC | Age: 67
End: 2022-01-28
Payer: MEDICARE

## 2022-01-28 VITALS
DIASTOLIC BLOOD PRESSURE: 78 MMHG | HEART RATE: 54 BPM | WEIGHT: 210.75 LBS | HEIGHT: 63 IN | SYSTOLIC BLOOD PRESSURE: 139 MMHG | BODY MASS INDEX: 37.34 KG/M2

## 2022-01-28 DIAGNOSIS — M81.0 OSTEOPOROSIS, UNSPECIFIED OSTEOPOROSIS TYPE, UNSPECIFIED PATHOLOGICAL FRACTURE PRESENCE: ICD-10-CM

## 2022-01-28 DIAGNOSIS — Z79.899 HIGH RISK MEDICATION USE: ICD-10-CM

## 2022-01-28 DIAGNOSIS — R42 VERTIGO: ICD-10-CM

## 2022-01-28 DIAGNOSIS — M05.9 SEROPOSITIVE RHEUMATOID ARTHRITIS: Primary | ICD-10-CM

## 2022-01-28 DIAGNOSIS — I10 PRIMARY HYPERTENSION: ICD-10-CM

## 2022-01-28 DIAGNOSIS — M19.90 OSTEOARTHRITIS, UNSPECIFIED OSTEOARTHRITIS TYPE, UNSPECIFIED SITE: ICD-10-CM

## 2022-01-28 PROCEDURE — 99999 PR PBB SHADOW E&M-EST. PATIENT-LVL IV: CPT | Mod: PBBFAC,,, | Performed by: PHYSICIAN ASSISTANT

## 2022-01-28 PROCEDURE — 3078F DIAST BP <80 MM HG: CPT | Mod: CPTII,S$GLB,, | Performed by: PHYSICIAN ASSISTANT

## 2022-01-28 PROCEDURE — 1159F MED LIST DOCD IN RCRD: CPT | Mod: CPTII,S$GLB,, | Performed by: PHYSICIAN ASSISTANT

## 2022-01-28 PROCEDURE — 99214 OFFICE O/P EST MOD 30 MIN: CPT | Mod: 25,S$GLB,, | Performed by: PHYSICIAN ASSISTANT

## 2022-01-28 PROCEDURE — 3288F FALL RISK ASSESSMENT DOCD: CPT | Mod: CPTII,S$GLB,, | Performed by: PHYSICIAN ASSISTANT

## 2022-01-28 PROCEDURE — 1100F PR PT FALLS ASSESS DOC 2+ FALLS/FALL W/INJURY/YR: ICD-10-PCS | Mod: CPTII,S$GLB,, | Performed by: PHYSICIAN ASSISTANT

## 2022-01-28 PROCEDURE — 99214 PR OFFICE/OUTPT VISIT, EST, LEVL IV, 30-39 MIN: ICD-10-PCS | Mod: 25,S$GLB,, | Performed by: PHYSICIAN ASSISTANT

## 2022-01-28 PROCEDURE — 1125F PR PAIN SEVERITY QUANTIFIED, PAIN PRESENT: ICD-10-PCS | Mod: CPTII,S$GLB,, | Performed by: PHYSICIAN ASSISTANT

## 2022-01-28 PROCEDURE — 99999 PR PBB SHADOW E&M-EST. PATIENT-LVL IV: ICD-10-PCS | Mod: PBBFAC,,, | Performed by: PHYSICIAN ASSISTANT

## 2022-01-28 PROCEDURE — 3075F SYST BP GE 130 - 139MM HG: CPT | Mod: CPTII,S$GLB,, | Performed by: PHYSICIAN ASSISTANT

## 2022-01-28 PROCEDURE — 3075F PR MOST RECENT SYSTOLIC BLOOD PRESS GE 130-139MM HG: ICD-10-PCS | Mod: CPTII,S$GLB,, | Performed by: PHYSICIAN ASSISTANT

## 2022-01-28 PROCEDURE — 1100F PTFALLS ASSESS-DOCD GE2>/YR: CPT | Mod: CPTII,S$GLB,, | Performed by: PHYSICIAN ASSISTANT

## 2022-01-28 PROCEDURE — 1159F PR MEDICATION LIST DOCUMENTED IN MEDICAL RECORD: ICD-10-PCS | Mod: CPTII,S$GLB,, | Performed by: PHYSICIAN ASSISTANT

## 2022-01-28 PROCEDURE — 3288F PR FALLS RISK ASSESSMENT DOCUMENTED: ICD-10-PCS | Mod: CPTII,S$GLB,, | Performed by: PHYSICIAN ASSISTANT

## 2022-01-28 PROCEDURE — 1160F RVW MEDS BY RX/DR IN RCRD: CPT | Mod: CPTII,S$GLB,, | Performed by: PHYSICIAN ASSISTANT

## 2022-01-28 PROCEDURE — 3078F PR MOST RECENT DIASTOLIC BLOOD PRESSURE < 80 MM HG: ICD-10-PCS | Mod: CPTII,S$GLB,, | Performed by: PHYSICIAN ASSISTANT

## 2022-01-28 PROCEDURE — 1125F AMNT PAIN NOTED PAIN PRSNT: CPT | Mod: CPTII,S$GLB,, | Performed by: PHYSICIAN ASSISTANT

## 2022-01-28 PROCEDURE — 96372 PR INJECTION,THERAP/PROPH/DIAG2ST, IM OR SUBCUT: ICD-10-PCS | Mod: S$GLB,,, | Performed by: PHYSICIAN ASSISTANT

## 2022-01-28 PROCEDURE — 1160F PR REVIEW ALL MEDS BY PRESCRIBER/CLIN PHARMACIST DOCUMENTED: ICD-10-PCS | Mod: CPTII,S$GLB,, | Performed by: PHYSICIAN ASSISTANT

## 2022-01-28 PROCEDURE — 96372 THER/PROPH/DIAG INJ SC/IM: CPT | Mod: S$GLB,,, | Performed by: PHYSICIAN ASSISTANT

## 2022-01-28 PROCEDURE — 3008F PR BODY MASS INDEX (BMI) DOCUMENTED: ICD-10-PCS | Mod: CPTII,S$GLB,, | Performed by: PHYSICIAN ASSISTANT

## 2022-01-28 PROCEDURE — 3008F BODY MASS INDEX DOCD: CPT | Mod: CPTII,S$GLB,, | Performed by: PHYSICIAN ASSISTANT

## 2022-01-28 RX ORDER — KETOROLAC TROMETHAMINE 30 MG/ML
30 INJECTION, SOLUTION INTRAMUSCULAR; INTRAVENOUS
Status: CANCELLED | OUTPATIENT
Start: 2022-01-28 | End: 2022-01-31

## 2022-01-28 RX ORDER — MECLIZINE HYDROCHLORIDE 25 MG/1
25 TABLET ORAL 3 TIMES DAILY PRN
Qty: 90 TABLET | Refills: 0 | Status: SHIPPED | OUTPATIENT
Start: 2022-01-28

## 2022-01-28 RX ORDER — KETOROLAC TROMETHAMINE 30 MG/ML
30 INJECTION, SOLUTION INTRAMUSCULAR; INTRAVENOUS ONCE
Status: COMPLETED | OUTPATIENT
Start: 2022-01-28 | End: 2022-01-28

## 2022-01-28 RX ORDER — ERGOCALCIFEROL 1.25 MG/1
50000 CAPSULE ORAL
Qty: 12 CAPSULE | Refills: 1 | Status: SHIPPED | OUTPATIENT
Start: 2022-01-28 | End: 2022-07-28 | Stop reason: SDUPTHER

## 2022-01-28 RX ORDER — KETOROLAC TROMETHAMINE 30 MG/ML
30 INJECTION, SOLUTION INTRAMUSCULAR; INTRAVENOUS
Status: COMPLETED | OUTPATIENT
Start: 2022-01-28 | End: 2022-01-28

## 2022-01-28 RX ORDER — DEXAMETHASONE SODIUM PHOSPHATE 4 MG/ML
4 INJECTION, SOLUTION INTRA-ARTICULAR; INTRALESIONAL; INTRAMUSCULAR; INTRAVENOUS; SOFT TISSUE
Status: COMPLETED | OUTPATIENT
Start: 2022-01-28 | End: 2022-01-28

## 2022-01-28 RX ORDER — KETOROLAC TROMETHAMINE 30 MG/ML
30 INJECTION, SOLUTION INTRAMUSCULAR; INTRAVENOUS
Status: CANCELLED | OUTPATIENT
Start: 2022-01-28 | End: 2022-01-28

## 2022-01-28 RX ORDER — AMLODIPINE BESYLATE 5 MG/1
5 TABLET ORAL DAILY
Qty: 90 TABLET | Refills: 1 | Status: SHIPPED | OUTPATIENT
Start: 2022-01-28 | End: 2022-07-28 | Stop reason: SDUPTHER

## 2022-01-28 RX ORDER — IBUPROFEN AND FAMOTIDINE 26.6; 8 MG/1; MG/1
1 TABLET ORAL 3 TIMES DAILY PRN
Qty: 90 TABLET | Refills: 5 | Status: SHIPPED | OUTPATIENT
Start: 2022-01-28 | End: 2022-05-27 | Stop reason: SDUPTHER

## 2022-01-28 RX ADMIN — KETOROLAC TROMETHAMINE 30 MG: 30 INJECTION, SOLUTION INTRAMUSCULAR; INTRAVENOUS at 08:01

## 2022-01-28 RX ADMIN — DEXAMETHASONE SODIUM PHOSPHATE 4 MG: 4 INJECTION, SOLUTION INTRA-ARTICULAR; INTRALESIONAL; INTRAMUSCULAR; INTRAVENOUS; SOFT TISSUE at 08:01

## 2022-01-28 ASSESSMENT — ROUTINE ASSESSMENT OF PATIENT INDEX DATA (RAPID3)
FATIGUE SCORE: 2.2
PSYCHOLOGICAL DISTRESS SCORE: 1.1
PATIENT GLOBAL ASSESSMENT SCORE: 6
PAIN SCORE: 6
TOTAL RAPID3 SCORE: 4.89
MDHAQ FUNCTION SCORE: 0.8

## 2022-01-28 NOTE — PROGRESS NOTES
Adminstered Decadron 4mg, 1cc in Right Upper Gluteal    Administered Ketorolac 60mg, 2cc's in Left Upper Gluteal

## 2022-01-28 NOTE — PROGRESS NOTES
Subjective:       Patient ID: Gwendoleparadise Valencia is a 66 y.o. female.    Chief Complaint: Disease Management    Mrs. Valencia is a 66 year old female who presents to clinic for follow up on seropositive rheumatoid arthritis. She is a new patient to me. She is doing fair overall. She has vertigo--had a fall ended up in ER and CT scan head was negative. Meclizine helps some but causes drowsiness. She is taking duexis and plaquenil twice daily and feels her arthritis is stable on this regimen. Arthrotec was added at last visit and I advised her not to take this with duexis but instead of if she wants to continue. Gabapentin helps some with L sided sciatic pain--only tried 1 tab nightly thus far. Not interested in PT. Cymbalta did not agree with her. She is concerned she may have kidney stones. She has noticed more L shoulder pain recently. We reviewed her recent labs in detail.      Current tx:  1. Plaquenil  2. Duexis    Review of Systems   Constitutional: Positive for activity change. Negative for appetite change, chills, fatigue and fever.   Eyes: Negative for visual disturbance.   Respiratory: Negative for cough and shortness of breath.    Cardiovascular: Negative for chest pain, palpitations and leg swelling.   Gastrointestinal: Negative for abdominal pain, constipation, diarrhea, nausea and vomiting.   Musculoskeletal: Positive for arthralgias and joint swelling.   Neurological: Negative for dizziness, weakness, light-headedness and headaches.         Objective:     Vitals:    01/28/22 0806   BP: 139/78   Pulse: (!) 54       Past Medical History:   Diagnosis Date    Acid reflux     GERD (gastroesophageal reflux disease)     Hypertension     Osteoporosis     Rheumatoid arthritis(714.0)     Rheumatoid arthritis     Past Surgical History:   Procedure Laterality Date    ADENOIDECTOMY      ANKLE LIGAMENT RECONSTRUCTION      CARPAL TUNNEL RELEASE Right     CHOLECYSTECTOMY      HYSTERECTOMY            Physical Exam    Constitutional: She is well-developed, well-nourished, and in no distress.   Eyes: Right conjunctiva is not injected. Left conjunctiva is not injected. Right eye exhibits normal extraocular motion. Left eye exhibits normal extraocular motion.   Neck: No JVD present. No thyromegaly present.   Cardiovascular: Normal rate and regular rhythm. Exam reveals no decreased pulses.   Pulmonary/Chest: She has no wheezes. She has no rhonchi. She has no rales.   Abdominal: There is no hepatosplenomegaly.   Musculoskeletal:      Right shoulder: Normal.      Left shoulder: Tenderness present.      Right elbow: Normal.      Left elbow: Normal.      Right wrist: Normal.      Left wrist: Normal.      Right knee: Normal.      Left knee: Normal.   Lymphadenopathy:     She has no cervical adenopathy.   Neurological: Gait normal.   Skin: No rash noted. No cyanosis.   Psychiatric: Mood and affect normal.       Right Side Rheumatological Exam     Examination finds the shoulder, elbow, wrist, knee, 1st MCP, 4th MCP and 5th MCP normal.    The patient is tender to palpation of the 2nd MCP and 3rd MCP    She has swelling of the 2nd MCP and 3rd MCP    The patient has an enlarged 1st PIP, 2nd PIP, 3rd PIP, 4th PIP and 5th PIP    Left Side Rheumatological Exam     Examination finds the elbow, wrist, knee, 1st MCP, 2nd MCP, 3rd MCP, 4th MCP and 5th MCP normal.    The patient is tender to palpation of the shoulder.    The patient has an enlarged 1st PIP, 2nd PIP, 3rd PIP, 4th PIP and 5th PIP.       Labs:  Component      Latest Ref Rng & Units 7/14/2020   WBC      3.4 - 10.8 x10E3/uL 14.2 (H)   RBC      3.77 - 5.28 x10E6/uL 4.71   Hemoglobin      11.1 - 15.9 g/dL 14.2   Hematocrit      34.0 - 46.6 % 43.4   MCV      79 - 97 fL 92   MCH      26.6 - 33.0 pg 30.1   MCHC      31.5 - 35.7 g/dL 32.7   RDW      11.7 - 15.4 % 13.2   Platelets      150 - 450 x10E3/uL 271   Neutrophils      Not Estab. % 58   Lymph %      Not Estab. % 29   Mono %      Not  Estab. % 8   Eosinophil %      Not Estab. % 3   Basophil %      Not Estab. % 1   Neutrophils, Abs      1.4 - 7.0 x10E3/uL 8.5 (H)   Lymph #      0.7 - 3.1 x10E3/uL 4.1 (H)   Mono #      0.1 - 0.9 x10E3/uL 1.1 (H)   Eos #      0.0 - 0.4 x10E3/uL 0.4   Baso #      0.0 - 0.2 x10E3/uL 0.1   Immature Granulocytes      Not Estab. % 1   Immature Grans (Abs)      0.0 - 0.1 x10E3/uL 0.1   Glucose      65 - 99 mg/dL 85   BUN      8 - 27 mg/dL 17   Creatinine      0.57 - 1.00 mg/dL 0.75   eGFR if non African American      >59 mL/min/1.73 84   eGFR if       >59 mL/min/1.73 97   BUN/CREAT RATIO      12 - 28 23   Sodium      134 - 144 mmol/L 147 (H)   Potassium      3.5 - 5.2 mmol/L 4.1   Chloride      96 - 106 mmol/L 106   CO2      20 - 29 mmol/L 27   Calcium      8.7 - 10.3 mg/dL 9.7   PROTEIN TOTAL      6.0 - 8.5 g/dL 6.7   Albumin      3.8 - 4.8 g/dL 4.0   Globulin, Total      1.5 - 4.5 g/dL 2.7   Albumin/Globulin Ratio      1.2 - 2.2 1.5   BILIRUBIN TOTAL      0.0 - 1.2 mg/dL 0.2   Alkaline Phosphatase      39 - 117 IU/L 124 (H)   AST      0 - 40 IU/L 13   ALT      0 - 32 IU/L 10   DEBO       Negative   Please Note:       Comment   Hemoglobin A1C External      4.8 - 5.6 % 5.6   T4, Free      0.82 - 1.77 ng/dL 1.24   TSH      0.450 - 4.500 uIU/mL 3.170   Rheumatoid Factor      0.0 - 13.9 IU/mL 19.9 (H)   Vit D, 25-Hydroxy      30.0 - 100.0 ng/mL 28.2 (L)   ds DNA Ab      0 - 9 IU/mL <1   CCP Antibodies IgG/IgA      0 - 19 units 5   Sed Rate      0 - 40 mm/hr 21   CRP      0 - 10 mg/L 5   T3, Free      2.0 - 4.4 pg/mL 3.1      Ext labs reviewed: WBC 11.4, CMP normal, RF 15.4, Vit D 33, thyroid wnl, amylase/lipase wnl, ESR 43  Assessment:       1. Seropositive rheumatoid arthritis    2. Osteoarthritis, unspecified osteoarthritis type, unspecified site    3. Osteoporosis, unspecified osteoporosis type, unspecified pathological fracture presence    4. High risk medication use    5. Vertigo    6. Primary  hypertension            Plan:       Seropositive rheumatoid arthritis  -     ibuprofen-famotidine (DUEXIS) 800-26.6 mg Tab; Take 1 tablet by mouth 3 (three) times daily as needed.  Dispense: 90 tablet; Refill: 5  -     ketorolac injection 30 mg  -     dexamethasone injection 4 mg    Osteoarthritis, unspecified osteoarthritis type, unspecified site    Osteoporosis, unspecified osteoporosis type, unspecified pathological fracture presence  -     ergocalciferol (ERGOCALCIFEROL) 50,000 unit Cap; Take 1 capsule (50,000 Units total) by mouth every 7 days.  Dispense: 12 capsule; Refill: 1    High risk medication use    Vertigo  -     meclizine (ANTIVERT) 25 mg tablet; Take 1 tablet (25 mg total) by mouth 3 (three) times daily as needed for Dizziness.  Dispense: 90 tablet; Refill: 0    Primary hypertension  -     amLODIPine (NORVASC) 5 MG tablet; Take 1 tablet (5 mg total) by mouth once daily.  Dispense: 90 tablet; Refill: 1        Assessment:  66 year old female  Seropositive (+RF) rheumatoid arthritis on plaquenil  --vitamin D insufficiency  --HTN    Plan:  1. toradol 30, dexa 4 given today  2. Cont plaquenil 200 mg bid  3. Cont duexis tid PRN. D/C arthrotec  4. Norvasc and meclizine refilled  5. Ergo 50,000 once weekly    Follow leukocytosis    Follow up:  4 mo Dr. Pulido w/labs prior

## 2022-02-04 DIAGNOSIS — M81.0 OSTEOPOROSIS, UNSPECIFIED OSTEOPOROSIS TYPE, UNSPECIFIED PATHOLOGICAL FRACTURE PRESENCE: ICD-10-CM

## 2022-02-04 DIAGNOSIS — M06.9 RHEUMATOID ARTHRITIS OF HAND: ICD-10-CM

## 2022-02-04 DIAGNOSIS — K21.00 GASTROESOPHAGEAL REFLUX DISEASE WITH ESOPHAGITIS: ICD-10-CM

## 2022-02-04 DIAGNOSIS — M16.0 PRIMARY OSTEOARTHRITIS OF BOTH HIPS: ICD-10-CM

## 2022-02-04 NOTE — TELEPHONE ENCOUNTER
Pharmacy requesting refill on Atelvia DR 35mg  Pt's LOV 01/28/2022  Pt's NOV 05/27/2022  Medication pending

## 2022-02-07 RX ORDER — RISEDRONATE SODIUM 30.1; 4.9 MG/1; MG/1
1 TABLET, DELAYED RELEASE ORAL
Qty: 4 TABLET | Refills: 6 | Status: SHIPPED | OUTPATIENT
Start: 2022-02-07 | End: 2022-07-20 | Stop reason: SDUPTHER

## 2022-03-10 ENCOUNTER — PATIENT MESSAGE (OUTPATIENT)
Dept: RHEUMATOLOGY | Facility: CLINIC | Age: 67
End: 2022-03-10
Payer: MEDICARE

## 2022-05-10 DIAGNOSIS — R21 RASH: ICD-10-CM

## 2022-05-10 RX ORDER — CLOTRIMAZOLE AND BETAMETHASONE DIPROPIONATE 10; .64 MG/G; MG/G
CREAM TOPICAL 2 TIMES DAILY
Qty: 45 G | Refills: 3 | Status: SHIPPED | OUTPATIENT
Start: 2022-05-10 | End: 2022-10-28 | Stop reason: SDUPTHER

## 2022-05-10 NOTE — TELEPHONE ENCOUNTER
Pharmacy requesting refill on Clotrimazole/Bethamethasone 45gm  Pt's LOV 01/28/2022  Pt's NOV 05/27/2022  Medication pending

## 2022-05-27 ENCOUNTER — OFFICE VISIT (OUTPATIENT)
Dept: RHEUMATOLOGY | Facility: CLINIC | Age: 67
End: 2022-05-27
Payer: MEDICARE

## 2022-05-27 VITALS
WEIGHT: 226.19 LBS | SYSTOLIC BLOOD PRESSURE: 150 MMHG | HEART RATE: 64 BPM | DIASTOLIC BLOOD PRESSURE: 87 MMHG | BODY MASS INDEX: 40.08 KG/M2 | HEIGHT: 63 IN

## 2022-05-27 DIAGNOSIS — R93.9 DIAGNOSTIC IMAGING INCONCLUSIVE DUE TO EXCESS BODY FAT OF PATIENT: ICD-10-CM

## 2022-05-27 DIAGNOSIS — M05.9 SEROPOSITIVE RHEUMATOID ARTHRITIS: Primary | ICD-10-CM

## 2022-05-27 DIAGNOSIS — M06.9 RHEUMATOID ARTHRITIS FLARE: ICD-10-CM

## 2022-05-27 DIAGNOSIS — M19.90 OSTEOARTHRITIS, UNSPECIFIED OSTEOARTHRITIS TYPE, UNSPECIFIED SITE: ICD-10-CM

## 2022-05-27 DIAGNOSIS — K21.00 GASTROESOPHAGEAL REFLUX DISEASE WITH ESOPHAGITIS, UNSPECIFIED WHETHER HEMORRHAGE: ICD-10-CM

## 2022-05-27 DIAGNOSIS — M81.0 OSTEOPOROSIS, UNSPECIFIED OSTEOPOROSIS TYPE, UNSPECIFIED PATHOLOGICAL FRACTURE PRESENCE: ICD-10-CM

## 2022-05-27 DIAGNOSIS — G89.4 CHRONIC PAIN SYNDROME: ICD-10-CM

## 2022-05-27 DIAGNOSIS — M48.02 SPINAL STENOSIS, CERVICAL REGION: ICD-10-CM

## 2022-05-27 PROCEDURE — 1101F PT FALLS ASSESS-DOCD LE1/YR: CPT | Mod: CPTII,S$GLB,, | Performed by: INTERNAL MEDICINE

## 2022-05-27 PROCEDURE — 3008F BODY MASS INDEX DOCD: CPT | Mod: CPTII,S$GLB,, | Performed by: INTERNAL MEDICINE

## 2022-05-27 PROCEDURE — 1125F AMNT PAIN NOTED PAIN PRSNT: CPT | Mod: CPTII,S$GLB,, | Performed by: INTERNAL MEDICINE

## 2022-05-27 PROCEDURE — 99999 PR PBB SHADOW E&M-EST. PATIENT-LVL V: CPT | Mod: PBBFAC,,, | Performed by: INTERNAL MEDICINE

## 2022-05-27 PROCEDURE — 1159F PR MEDICATION LIST DOCUMENTED IN MEDICAL RECORD: ICD-10-PCS | Mod: CPTII,S$GLB,, | Performed by: INTERNAL MEDICINE

## 2022-05-27 PROCEDURE — 3288F FALL RISK ASSESSMENT DOCD: CPT | Mod: CPTII,S$GLB,, | Performed by: INTERNAL MEDICINE

## 2022-05-27 PROCEDURE — 99999 PR PBB SHADOW E&M-EST. PATIENT-LVL V: ICD-10-PCS | Mod: PBBFAC,,, | Performed by: INTERNAL MEDICINE

## 2022-05-27 PROCEDURE — 96372 PR INJECTION,THERAP/PROPH/DIAG2ST, IM OR SUBCUT: ICD-10-PCS | Mod: S$GLB,,, | Performed by: INTERNAL MEDICINE

## 2022-05-27 PROCEDURE — 1159F MED LIST DOCD IN RCRD: CPT | Mod: CPTII,S$GLB,, | Performed by: INTERNAL MEDICINE

## 2022-05-27 PROCEDURE — 96372 THER/PROPH/DIAG INJ SC/IM: CPT | Mod: S$GLB,,, | Performed by: INTERNAL MEDICINE

## 2022-05-27 PROCEDURE — 99215 PR OFFICE/OUTPT VISIT, EST, LEVL V, 40-54 MIN: ICD-10-PCS | Mod: 25,S$GLB,, | Performed by: INTERNAL MEDICINE

## 2022-05-27 PROCEDURE — 3008F PR BODY MASS INDEX (BMI) DOCUMENTED: ICD-10-PCS | Mod: CPTII,S$GLB,, | Performed by: INTERNAL MEDICINE

## 2022-05-27 PROCEDURE — 1125F PR PAIN SEVERITY QUANTIFIED, PAIN PRESENT: ICD-10-PCS | Mod: CPTII,S$GLB,, | Performed by: INTERNAL MEDICINE

## 2022-05-27 PROCEDURE — 3288F PR FALLS RISK ASSESSMENT DOCUMENTED: ICD-10-PCS | Mod: CPTII,S$GLB,, | Performed by: INTERNAL MEDICINE

## 2022-05-27 PROCEDURE — 1101F PR PT FALLS ASSESS DOC 0-1 FALLS W/OUT INJ PAST YR: ICD-10-PCS | Mod: CPTII,S$GLB,, | Performed by: INTERNAL MEDICINE

## 2022-05-27 PROCEDURE — 99215 OFFICE O/P EST HI 40 MIN: CPT | Mod: 25,S$GLB,, | Performed by: INTERNAL MEDICINE

## 2022-05-27 RX ORDER — KETOROLAC TROMETHAMINE 30 MG/ML
60 INJECTION, SOLUTION INTRAMUSCULAR; INTRAVENOUS
Status: COMPLETED | OUTPATIENT
Start: 2022-05-27 | End: 2022-05-27

## 2022-05-27 RX ORDER — CYANOCOBALAMIN 1000 UG/ML
1000 INJECTION, SOLUTION INTRAMUSCULAR; SUBCUTANEOUS
Status: COMPLETED | OUTPATIENT
Start: 2022-05-27 | End: 2022-05-27

## 2022-05-27 RX ORDER — DEXAMETHASONE SODIUM PHOSPHATE 4 MG/ML
4 INJECTION, SOLUTION INTRA-ARTICULAR; INTRALESIONAL; INTRAMUSCULAR; INTRAVENOUS; SOFT TISSUE
Status: COMPLETED | OUTPATIENT
Start: 2022-05-27 | End: 2022-05-27

## 2022-05-27 RX ORDER — TRAMADOL HYDROCHLORIDE 50 MG/1
50 TABLET ORAL EVERY 6 HOURS PRN
COMMUNITY
Start: 2022-05-23 | End: 2022-10-28

## 2022-05-27 RX ORDER — IBUPROFEN AND FAMOTIDINE 26.6; 8 MG/1; MG/1
1 TABLET ORAL 3 TIMES DAILY PRN
Qty: 90 TABLET | Refills: 5 | Status: SHIPPED | OUTPATIENT
Start: 2022-05-27 | End: 2023-02-24 | Stop reason: SDUPTHER

## 2022-05-27 RX ORDER — DOXYCYCLINE HYCLATE 100 MG
100 TABLET ORAL 2 TIMES DAILY
Qty: 20 TABLET | Refills: 0 | Status: SHIPPED | OUTPATIENT
Start: 2022-05-27 | End: 2022-06-06

## 2022-05-27 RX ORDER — DEXLANSOPRAZOLE 60 MG/1
60 CAPSULE, DELAYED RELEASE ORAL DAILY
Qty: 90 CAPSULE | Refills: 3 | Status: SHIPPED | OUTPATIENT
Start: 2022-05-27 | End: 2023-05-27

## 2022-05-27 RX ADMIN — CYANOCOBALAMIN 1000 MCG: 1000 INJECTION, SOLUTION INTRAMUSCULAR; SUBCUTANEOUS at 10:05

## 2022-05-27 RX ADMIN — DEXAMETHASONE SODIUM PHOSPHATE 4 MG: 4 INJECTION, SOLUTION INTRA-ARTICULAR; INTRALESIONAL; INTRAMUSCULAR; INTRAVENOUS; SOFT TISSUE at 10:05

## 2022-05-27 RX ADMIN — KETOROLAC TROMETHAMINE 60 MG: 30 INJECTION, SOLUTION INTRAMUSCULAR; INTRAVENOUS at 10:05

## 2022-05-27 ASSESSMENT — ROUTINE ASSESSMENT OF PATIENT INDEX DATA (RAPID3)
FATIGUE SCORE: 1.1
PATIENT GLOBAL ASSESSMENT SCORE: 8
MDHAQ FUNCTION SCORE: 1.1
PSYCHOLOGICAL DISTRESS SCORE: 0
PAIN SCORE: 9
TOTAL RAPID3 SCORE: 6.89

## 2022-05-27 NOTE — PROGRESS NOTES
Subjective:       Patient ID: Gwadwoa Valencia is a 67 y.o. female.    Chief Complaint: Disease Management    Follow up: 67 year old female who presents to clinic for follow up on seropositive rheumatoid arthritis. She is taking duexis and plaquenil twice daily and feels her arthritis is stable on this regimen. She had an episode sob and L arm numbness and  Sob chest xray showed interstitial marking, elevated wbc count saw Gabapentin helps some with L sided sciatic pain--only tried 1 tab nightly thus far. Not interested in PT. Cymbalta did not agree with her. She is concerned she may have kidney stones. She has noticed more L shoulder pain recently. We reviewed her recent labs in detail.      Current tx:  1. Plaquenil  2. Duexis            She complains of joint swelling. Associated symptoms include fatigue. Pertinent negatives include no fever or headaches.         Review of Systems   Constitutional: Positive for activity change and fatigue. Negative for appetite change, chills and fever.   Eyes: Negative for visual disturbance.   Respiratory: Negative for cough and shortness of breath.    Cardiovascular: Negative for chest pain, palpitations and leg swelling.   Gastrointestinal: Negative for abdominal pain, constipation, diarrhea, nausea and vomiting.   Musculoskeletal: Positive for arthralgias and joint swelling.   Neurological: Negative for dizziness, weakness, light-headedness and headaches.         Objective:     Vitals:    05/27/22 0812   BP: (!) 150/87   Pulse: 64       Past Medical History:   Diagnosis Date    Acid reflux     GERD (gastroesophageal reflux disease)     Hypertension     Osteoporosis     Rheumatoid arthritis(714.0)     Rheumatoid arthritis     Past Surgical History:   Procedure Laterality Date    ADENOIDECTOMY      ANKLE LIGAMENT RECONSTRUCTION      CARPAL TUNNEL RELEASE Right     CHOLECYSTECTOMY      HYSTERECTOMY            Physical Exam   Eyes: Right conjunctiva is not injected.  Left conjunctiva is not injected. Right eye exhibits normal extraocular motion. Left eye exhibits normal extraocular motion.   Neck: No JVD present. No thyromegaly present.   Cardiovascular: Normal rate and regular rhythm. Exam reveals no decreased pulses.   Pulmonary/Chest: She has no wheezes. She has no rhonchi. She has no rales.   Musculoskeletal:         General: Tenderness and deformity present.      Right shoulder: Normal.      Left shoulder: Tenderness present.      Right elbow: Normal.      Left elbow: Normal.      Right wrist: Normal.      Left wrist: Normal.      Right knee: Normal.      Left knee: Normal.      Right lower leg: Edema present.      Left lower leg: Edema present.   Lymphadenopathy:     She has no cervical adenopathy.   Neurological: Gait normal.   Skin: No rash noted.   Psychiatric: Mood and affect normal.       Right Side Rheumatological Exam     Examination finds the shoulder, elbow, wrist, knee, 1st MCP, 4th MCP and 5th MCP normal.    The patient is tender to palpation of the 2nd MCP and 3rd MCP    She has swelling of the 2nd MCP and 3rd MCP    The patient has an enlarged 1st PIP, 2nd PIP, 3rd PIP, 4th PIP and 5th PIP    Left Side Rheumatological Exam     Examination finds the elbow, wrist, knee, 1st MCP, 2nd MCP, 3rd MCP, 4th MCP and 5th MCP normal.    The patient is tender to palpation of the shoulder.    The patient has an enlarged 1st PIP, 2nd PIP, 3rd PIP, 4th PIP and 5th PIP.        cbc 12, troponin wnl,  Esr 34  elevated wbc elevated 12  Assessment:       1. Seropositive rheumatoid arthritis    2. Osteoarthritis, unspecified osteoarthritis type, unspecified site    3. Rheumatoid arthritis flare    4. Spinal stenosis, cervical region    5. Osteoporosis, unspecified osteoporosis type, unspecified pathological fracture presence    6. Diagnostic imaging inconclusive due to excess body fat of patient             Plan:       Seropositive rheumatoid arthritis  -     ketorolac injection  60 mg  -     dexamethasone injection 4 mg  -     cyanocobalamin injection 1,000 mcg  -     doxycycline (VIBRA-TABS) 100 MG tablet; Take 1 tablet (100 mg total) by mouth 2 (two) times daily. for 10 days  Dispense: 20 tablet; Refill: 0  -     X-Ray Cervical Spine AP And Lateral; Future; Expected date: 05/27/2022  -     X-Ray Hips Bilateral 2 [employer] AP Pelvis; Future; Expected date: 05/27/2022  -     Mammo Digital Diagnostic Bilat; Future; Expected date: 05/27/2022    Osteoarthritis, unspecified osteoarthritis type, unspecified site  -     ketorolac injection 60 mg  -     dexamethasone injection 4 mg  -     cyanocobalamin injection 1,000 mcg  -     doxycycline (VIBRA-TABS) 100 MG tablet; Take 1 tablet (100 mg total) by mouth 2 (two) times daily. for 10 days  Dispense: 20 tablet; Refill: 0  -     X-Ray Cervical Spine AP And Lateral; Future; Expected date: 05/27/2022  -     X-Ray Hips Bilateral 2 [employer] AP Pelvis; Future; Expected date: 05/27/2022  -     Mammo Digital Diagnostic Bilat; Future; Expected date: 05/27/2022    Rheumatoid arthritis flare  -     ketorolac injection 60 mg  -     dexamethasone injection 4 mg  -     cyanocobalamin injection 1,000 mcg  -     doxycycline (VIBRA-TABS) 100 MG tablet; Take 1 tablet (100 mg total) by mouth 2 (two) times daily. for 10 days  Dispense: 20 tablet; Refill: 0  -     X-Ray Cervical Spine AP And Lateral; Future; Expected date: 05/27/2022  -     X-Ray Hips Bilateral 2 [employer] AP Pelvis; Future; Expected date: 05/27/2022  -     Mammo Digital Diagnostic Bilat; Future; Expected date: 05/27/2022    Spinal stenosis, cervical region  -     ketorolac injection 60 mg  -     dexamethasone injection 4 mg  -     cyanocobalamin injection 1,000 mcg  -     doxycycline (VIBRA-TABS) 100 MG tablet; Take 1 tablet (100 mg total) by mouth 2 (two) times daily. for 10 days  Dispense: 20 tablet; Refill: 0  -     X-Ray Cervical Spine AP And Lateral; Future; Expected date: 05/27/2022  -     X-Ray  Hips Bilateral 2 [employer] AP Pelvis; Future; Expected date: 05/27/2022  -     Mammo Digital Diagnostic Bilat; Future; Expected date: 05/27/2022    Osteoporosis, unspecified osteoporosis type, unspecified pathological fracture presence  -     DXA Bone Density Spine And Hip; Future; Expected date: 05/27/2022  -     Mammo Digital Diagnostic Bilat; Future; Expected date: 05/27/2022    Diagnostic imaging inconclusive due to excess body fat of patient   -     Mammo Digital Diagnostic Bilat; Future; Expected date: 05/27/2022        Assessment:  66 year old female  Seropositive (+RF) rheumatoid arthritis on plaquenil  --vitamin D insufficiency  --HTN    Plan:  1. toradol 60, dexa 4 given today  2. Cont plaquenil 200 mg bid  3. Cont duexis tid PRN. D/C arthrotec  4. Norvasc and meclizine refilled  5. Ergo 50,000 once weekly

## 2022-05-30 NOTE — PROGRESS NOTES
Administered 1 cc ( 1000 mcg/ml ) of b12 to the right upper outer gluteal. Informed of s/s to report verbalized understanding. No adverse reactions noted.        Administered 1 cc dexamethasone 4mg/cc  to right upper outer gluteal. Pt tolerated well. No acute reaction noted to site. Pt instructed on S/S to report. Pt verbalized understanding.         Administered 2 cc ( 30 mg/ml ) of toradol to the left upper outer gluteal. Informed of s/s to report verbalized understanding. No adverse reactions noted.

## 2022-06-14 ENCOUNTER — TELEPHONE (OUTPATIENT)
Dept: RHEUMATOLOGY | Facility: CLINIC | Age: 67
End: 2022-06-14
Payer: MEDICARE

## 2022-07-20 DIAGNOSIS — M81.0 OSTEOPOROSIS, UNSPECIFIED OSTEOPOROSIS TYPE, UNSPECIFIED PATHOLOGICAL FRACTURE PRESENCE: ICD-10-CM

## 2022-07-20 DIAGNOSIS — M16.0 PRIMARY OSTEOARTHRITIS OF BOTH HIPS: ICD-10-CM

## 2022-07-20 DIAGNOSIS — K21.00 GASTROESOPHAGEAL REFLUX DISEASE WITH ESOPHAGITIS: ICD-10-CM

## 2022-07-20 DIAGNOSIS — M06.9 RHEUMATOID ARTHRITIS OF HAND: ICD-10-CM

## 2022-07-28 DIAGNOSIS — I10 PRIMARY HYPERTENSION: ICD-10-CM

## 2022-07-28 DIAGNOSIS — M81.0 OSTEOPOROSIS, UNSPECIFIED OSTEOPOROSIS TYPE, UNSPECIFIED PATHOLOGICAL FRACTURE PRESENCE: ICD-10-CM

## 2022-07-28 NOTE — TELEPHONE ENCOUNTER
Pharmacy requesting refill on Vitamin D3 [id number] and Amlodipine 5mg  Pt's LOV 05/27/2022  Pt's NOV 10/28/2022  Medication pending

## 2022-07-30 RX ORDER — AMLODIPINE BESYLATE 5 MG/1
5 TABLET ORAL DAILY
Qty: 90 TABLET | Refills: 1 | Status: SHIPPED | OUTPATIENT
Start: 2022-07-30 | End: 2022-10-28 | Stop reason: SDUPTHER

## 2022-07-30 RX ORDER — ERGOCALCIFEROL 1.25 MG/1
50000 CAPSULE ORAL
Qty: 12 CAPSULE | Refills: 1 | Status: SHIPPED | OUTPATIENT
Start: 2022-07-30 | End: 2022-10-28 | Stop reason: SDUPTHER

## 2022-08-08 RX ORDER — RISEDRONATE SODIUM 30.1; 4.9 MG/1; MG/1
1 TABLET, DELAYED RELEASE ORAL
Qty: 4 TABLET | Refills: 6 | Status: SHIPPED | OUTPATIENT
Start: 2022-08-08 | End: 2022-10-28 | Stop reason: SDUPTHER

## 2022-10-28 ENCOUNTER — OFFICE VISIT (OUTPATIENT)
Dept: RHEUMATOLOGY | Facility: CLINIC | Age: 67
End: 2022-10-28
Payer: MEDICARE

## 2022-10-28 VITALS
HEIGHT: 63 IN | HEART RATE: 77 BPM | BODY MASS INDEX: 38.75 KG/M2 | WEIGHT: 218.69 LBS | SYSTOLIC BLOOD PRESSURE: 153 MMHG | DIASTOLIC BLOOD PRESSURE: 91 MMHG

## 2022-10-28 DIAGNOSIS — K21.00 GASTROESOPHAGEAL REFLUX DISEASE WITH ESOPHAGITIS WITHOUT HEMORRHAGE: ICD-10-CM

## 2022-10-28 DIAGNOSIS — M81.0 OSTEOPOROSIS, UNSPECIFIED OSTEOPOROSIS TYPE, UNSPECIFIED PATHOLOGICAL FRACTURE PRESENCE: ICD-10-CM

## 2022-10-28 DIAGNOSIS — M05.9 SEROPOSITIVE RHEUMATOID ARTHRITIS: Primary | ICD-10-CM

## 2022-10-28 DIAGNOSIS — B37.9 CANDIDIASIS: ICD-10-CM

## 2022-10-28 DIAGNOSIS — I10 PRIMARY HYPERTENSION: ICD-10-CM

## 2022-10-28 DIAGNOSIS — G89.29 CHRONIC RIGHT SHOULDER PAIN: ICD-10-CM

## 2022-10-28 DIAGNOSIS — M16.0 PRIMARY OSTEOARTHRITIS OF BOTH HIPS: ICD-10-CM

## 2022-10-28 DIAGNOSIS — M25.511 CHRONIC RIGHT SHOULDER PAIN: ICD-10-CM

## 2022-10-28 DIAGNOSIS — M54.32 SCIATIC NERVE PAIN, LEFT: ICD-10-CM

## 2022-10-28 DIAGNOSIS — M94.9 DISORDER OF CARTILAGE, UNSPECIFIED: ICD-10-CM

## 2022-10-28 DIAGNOSIS — R21 RASH: ICD-10-CM

## 2022-10-28 DIAGNOSIS — M19.90 OSTEOARTHRITIS, UNSPECIFIED OSTEOARTHRITIS TYPE, UNSPECIFIED SITE: ICD-10-CM

## 2022-10-28 PROCEDURE — 99215 OFFICE O/P EST HI 40 MIN: CPT | Mod: 25,S$GLB,, | Performed by: INTERNAL MEDICINE

## 2022-10-28 PROCEDURE — 3288F FALL RISK ASSESSMENT DOCD: CPT | Mod: CPTII,S$GLB,, | Performed by: INTERNAL MEDICINE

## 2022-10-28 PROCEDURE — 3077F SYST BP >= 140 MM HG: CPT | Mod: CPTII,S$GLB,, | Performed by: INTERNAL MEDICINE

## 2022-10-28 PROCEDURE — 1125F AMNT PAIN NOTED PAIN PRSNT: CPT | Mod: CPTII,S$GLB,, | Performed by: INTERNAL MEDICINE

## 2022-10-28 PROCEDURE — 3008F PR BODY MASS INDEX (BMI) DOCUMENTED: ICD-10-PCS | Mod: CPTII,S$GLB,, | Performed by: INTERNAL MEDICINE

## 2022-10-28 PROCEDURE — 1159F MED LIST DOCD IN RCRD: CPT | Mod: CPTII,S$GLB,, | Performed by: INTERNAL MEDICINE

## 2022-10-28 PROCEDURE — 1101F PT FALLS ASSESS-DOCD LE1/YR: CPT | Mod: CPTII,S$GLB,, | Performed by: INTERNAL MEDICINE

## 2022-10-28 PROCEDURE — 3077F PR MOST RECENT SYSTOLIC BLOOD PRESSURE >= 140 MM HG: ICD-10-PCS | Mod: CPTII,S$GLB,, | Performed by: INTERNAL MEDICINE

## 2022-10-28 PROCEDURE — 3288F PR FALLS RISK ASSESSMENT DOCUMENTED: ICD-10-PCS | Mod: CPTII,S$GLB,, | Performed by: INTERNAL MEDICINE

## 2022-10-28 PROCEDURE — 1159F PR MEDICATION LIST DOCUMENTED IN MEDICAL RECORD: ICD-10-PCS | Mod: CPTII,S$GLB,, | Performed by: INTERNAL MEDICINE

## 2022-10-28 PROCEDURE — 96372 PR INJECTION,THERAP/PROPH/DIAG2ST, IM OR SUBCUT: ICD-10-PCS | Mod: S$GLB,,, | Performed by: INTERNAL MEDICINE

## 2022-10-28 PROCEDURE — 3080F DIAST BP >= 90 MM HG: CPT | Mod: CPTII,S$GLB,, | Performed by: INTERNAL MEDICINE

## 2022-10-28 PROCEDURE — 99999 PR PBB SHADOW E&M-EST. PATIENT-LVL III: CPT | Mod: PBBFAC,,, | Performed by: INTERNAL MEDICINE

## 2022-10-28 PROCEDURE — 99999 PR PBB SHADOW E&M-EST. PATIENT-LVL III: ICD-10-PCS | Mod: PBBFAC,,, | Performed by: INTERNAL MEDICINE

## 2022-10-28 PROCEDURE — 96372 THER/PROPH/DIAG INJ SC/IM: CPT | Mod: S$GLB,,, | Performed by: INTERNAL MEDICINE

## 2022-10-28 PROCEDURE — 99215 PR OFFICE/OUTPT VISIT, EST, LEVL V, 40-54 MIN: ICD-10-PCS | Mod: 25,S$GLB,, | Performed by: INTERNAL MEDICINE

## 2022-10-28 PROCEDURE — 1101F PR PT FALLS ASSESS DOC 0-1 FALLS W/OUT INJ PAST YR: ICD-10-PCS | Mod: CPTII,S$GLB,, | Performed by: INTERNAL MEDICINE

## 2022-10-28 PROCEDURE — 3008F BODY MASS INDEX DOCD: CPT | Mod: CPTII,S$GLB,, | Performed by: INTERNAL MEDICINE

## 2022-10-28 PROCEDURE — 3080F PR MOST RECENT DIASTOLIC BLOOD PRESSURE >= 90 MM HG: ICD-10-PCS | Mod: CPTII,S$GLB,, | Performed by: INTERNAL MEDICINE

## 2022-10-28 PROCEDURE — 1125F PR PAIN SEVERITY QUANTIFIED, PAIN PRESENT: ICD-10-PCS | Mod: CPTII,S$GLB,, | Performed by: INTERNAL MEDICINE

## 2022-10-28 RX ORDER — FLUCONAZOLE 150 MG/1
150 TABLET ORAL DAILY
Qty: 7 TABLET | Refills: 3 | Status: SHIPPED | OUTPATIENT
Start: 2022-10-28 | End: 2022-11-04

## 2022-10-28 RX ORDER — MUPIROCIN CALCIUM 20 MG/G
CREAM TOPICAL 2 TIMES DAILY
Qty: 30 G | Refills: 1 | Status: SHIPPED | OUTPATIENT
Start: 2022-10-28 | End: 2022-11-07

## 2022-10-28 RX ORDER — CLOTRIMAZOLE AND BETAMETHASONE DIPROPIONATE 10; .64 MG/G; MG/G
CREAM TOPICAL 2 TIMES DAILY
Qty: 45 G | Refills: 3 | Status: SHIPPED | OUTPATIENT
Start: 2022-10-28 | End: 2023-09-26

## 2022-10-28 RX ORDER — ERGOCALCIFEROL 1.25 MG/1
50000 CAPSULE ORAL
Qty: 12 CAPSULE | Refills: 3 | Status: SHIPPED | OUTPATIENT
Start: 2022-10-28 | End: 2023-10-08

## 2022-10-28 RX ORDER — KETOROLAC TROMETHAMINE 30 MG/ML
60 INJECTION, SOLUTION INTRAMUSCULAR; INTRAVENOUS
Status: COMPLETED | OUTPATIENT
Start: 2022-10-28 | End: 2022-10-28

## 2022-10-28 RX ORDER — AMLODIPINE BESYLATE 5 MG/1
5 TABLET ORAL DAILY
Qty: 90 TABLET | Refills: 3 | Status: SHIPPED | OUTPATIENT
Start: 2022-10-28 | End: 2023-10-19

## 2022-10-28 RX ORDER — RISEDRONATE SODIUM 30.1; 4.9 MG/1; MG/1
1 TABLET, DELAYED RELEASE ORAL
Qty: 12 TABLET | Refills: 3 | Status: SHIPPED | OUTPATIENT
Start: 2022-10-28 | End: 2023-05-04 | Stop reason: SDUPTHER

## 2022-10-28 RX ADMIN — KETOROLAC TROMETHAMINE 60 MG: 30 INJECTION, SOLUTION INTRAMUSCULAR; INTRAVENOUS at 09:10

## 2022-10-28 ASSESSMENT — ROUTINE ASSESSMENT OF PATIENT INDEX DATA (RAPID3)
MDHAQ FUNCTION SCORE: 0.6
FATIGUE SCORE: 2.2
PAIN SCORE: 8
PSYCHOLOGICAL DISTRESS SCORE: 1.1
PATIENT GLOBAL ASSESSMENT SCORE: 5
TOTAL RAPID3 SCORE: 5

## 2022-10-28 NOTE — PROGRESS NOTES
Subjective:       Patient ID: Gwendkatrin Valencia is a 67 y.o. female.    Chief Complaint: Disease Management    Follow up: 67 year old female who presents to clinic for follow up on seropositive rheumatoid arthritis. She is taking duexis and plaquenil twice daily and feels her arthritis is stable on this regimen. She had an episode sob and L arm numbness and  Sob chest xray showed interstitial marking, elevated wbc count saw Gabapentin helps some with L sided sciatic pain--only tried 1 tab nightly thus far. R shoulder is painful. Current tx:  1. Plaquenil  2. Duexis    Review of Systems   Constitutional:  Positive for activity change. Negative for appetite change and chills.   Eyes:  Negative for visual disturbance.   Respiratory:  Negative for cough and shortness of breath.    Cardiovascular:  Negative for chest pain, palpitations and leg swelling.   Gastrointestinal:  Negative for abdominal pain, constipation, diarrhea, nausea and vomiting.   Musculoskeletal:  Positive for arthralgias.   Neurological:  Negative for dizziness, weakness and light-headedness.       Objective:     Vitals:    10/28/22 0816   BP: (!) 153/91   Pulse: 77       Past Medical History:   Diagnosis Date    Acid reflux     GERD (gastroesophageal reflux disease)     Hypertension     Osteoporosis     Rheumatoid arthritis(714.0)     Rheumatoid arthritis     Past Surgical History:   Procedure Laterality Date    ADENOIDECTOMY      ANKLE LIGAMENT RECONSTRUCTION      CARPAL TUNNEL RELEASE Right     CHOLECYSTECTOMY      HYSTERECTOMY            Physical Exam   Eyes: Right conjunctiva is not injected. Left conjunctiva is not injected. Right eye exhibits normal extraocular motion. Left eye exhibits normal extraocular motion.   Neck: No JVD present. No thyromegaly present.   Cardiovascular: Normal rate and regular rhythm. Exam reveals no decreased pulses.   Pulmonary/Chest: She has no wheezes. She has no rhonchi. She has no rales.   Musculoskeletal:          General: Tenderness and deformity present.      Right shoulder: Normal.      Left shoulder: Tenderness present.      Right elbow: Normal.      Left elbow: Normal.      Right wrist: Normal.      Left wrist: Normal.      Right knee: Normal.      Left knee: Normal.      Right lower leg: Edema present.      Left lower leg: Edema present.   Lymphadenopathy:     She has no cervical adenopathy.   Neurological: Gait normal.   Skin: No rash noted.   Psychiatric: Mood and affect normal.       Right Side Rheumatological Exam     Examination finds the shoulder, elbow, wrist, knee, 1st MCP, 4th MCP and 5th MCP normal.    The patient is tender to palpation of the 2nd MCP and 3rd MCP    She has swelling of the 2nd MCP and 3rd MCP    The patient has an enlarged 1st PIP, 2nd PIP, 3rd PIP, 4th PIP and 5th PIP    Left Side Rheumatological Exam     Examination finds the elbow, wrist, knee, 1st MCP, 2nd MCP, 3rd MCP, 4th MCP and 5th MCP normal.    The patient is tender to palpation of the shoulder.    The patient has an enlarged 1st PIP, 2nd PIP, 3rd PIP, 4th PIP and 5th PIP.      cbc 12, troponin wnl,  Esr 34  elevated wbc elevated 12  Assessment:       1. Seropositive rheumatoid arthritis    2. Osteoarthritis, unspecified osteoarthritis type, unspecified site    3. Candidiasis    4. Sciatic nerve pain, left    5. Chronic right shoulder pain    6. Disorder of cartilage, unspecified    7. Primary hypertension    8. Rheumatoid arthritis of hand    9. Primary osteoarthritis of both hips    10. Osteoporosis, unspecified osteoporosis type, unspecified pathological fracture presence    11. Gastroesophageal reflux disease with esophagitis    12. Rash              Plan:       Seropositive rheumatoid arthritis  -     ketorolac injection 60 mg  -     CBC Auto Differential; Future; Expected date: 10/28/2022  -     Comprehensive Metabolic Panel; Future; Expected date: 10/28/2022  -     C-Reactive Protein; Future; Expected date: 10/28/2022  -      Rheumatoid Factor; Future; Expected date: 10/28/2022  -     Cyclic Citrullinated Peptide Antibody, IgG; Future; Expected date: 10/28/2022  -     Vitamin D; Future; Expected date: 10/28/2022  -     Sedimentation Rate; Future; Expected date: 10/28/2022  -     amLODIPine (NORVASC) 5 MG tablet; Take 1 tablet (5 mg total) by mouth once daily.  Dispense: 90 tablet; Refill: 3  -     ATELVIA 35 mg TbEC; Take 1 tablet (35 mg total) by mouth every 7 days.  Dispense: 12 tablet; Refill: 3  -     clotrimazole-betamethasone 1-0.05% (LOTRISONE) cream; Apply topically 2 (two) times daily.  Dispense: 45 g; Refill: 3  -     ergocalciferol (ERGOCALCIFEROL) 50,000 unit Cap; Take 1 capsule (50,000 Units total) by mouth every 7 days.  Dispense: 12 capsule; Refill: 3  -     mupirocin calcium 2% (BACTROBAN) 2 % cream; Apply topically 2 (two) times daily. for 10 days  Dispense: 30 g; Refill: 1    Osteoarthritis, unspecified osteoarthritis type, unspecified site  -     ketorolac injection 60 mg  -     CBC Auto Differential; Future; Expected date: 10/28/2022  -     Comprehensive Metabolic Panel; Future; Expected date: 10/28/2022  -     C-Reactive Protein; Future; Expected date: 10/28/2022  -     Rheumatoid Factor; Future; Expected date: 10/28/2022  -     Cyclic Citrullinated Peptide Antibody, IgG; Future; Expected date: 10/28/2022  -     Vitamin D; Future; Expected date: 10/28/2022  -     Sedimentation Rate; Future; Expected date: 10/28/2022  -     amLODIPine (NORVASC) 5 MG tablet; Take 1 tablet (5 mg total) by mouth once daily.  Dispense: 90 tablet; Refill: 3  -     ATELVIA 35 mg TbEC; Take 1 tablet (35 mg total) by mouth every 7 days.  Dispense: 12 tablet; Refill: 3  -     clotrimazole-betamethasone 1-0.05% (LOTRISONE) cream; Apply topically 2 (two) times daily.  Dispense: 45 g; Refill: 3  -     ergocalciferol (ERGOCALCIFEROL) 50,000 unit Cap; Take 1 capsule (50,000 Units total) by mouth every 7 days.  Dispense: 12 capsule; Refill: 3  -      mupirocin calcium 2% (BACTROBAN) 2 % cream; Apply topically 2 (two) times daily. for 10 days  Dispense: 30 g; Refill: 1    Candidiasis  -     fluconazole (DIFLUCAN) 150 MG Tab; Take 1 tablet (150 mg total) by mouth once daily. for 7 days  Dispense: 7 tablet; Refill: 3  -     ketorolac injection 60 mg  -     CBC Auto Differential; Future; Expected date: 10/28/2022  -     Comprehensive Metabolic Panel; Future; Expected date: 10/28/2022  -     C-Reactive Protein; Future; Expected date: 10/28/2022  -     Rheumatoid Factor; Future; Expected date: 10/28/2022  -     Cyclic Citrullinated Peptide Antibody, IgG; Future; Expected date: 10/28/2022  -     Vitamin D; Future; Expected date: 10/28/2022  -     Sedimentation Rate; Future; Expected date: 10/28/2022  -     amLODIPine (NORVASC) 5 MG tablet; Take 1 tablet (5 mg total) by mouth once daily.  Dispense: 90 tablet; Refill: 3  -     ATELVIA 35 mg TbEC; Take 1 tablet (35 mg total) by mouth every 7 days.  Dispense: 12 tablet; Refill: 3  -     clotrimazole-betamethasone 1-0.05% (LOTRISONE) cream; Apply topically 2 (two) times daily.  Dispense: 45 g; Refill: 3  -     ergocalciferol (ERGOCALCIFEROL) 50,000 unit Cap; Take 1 capsule (50,000 Units total) by mouth every 7 days.  Dispense: 12 capsule; Refill: 3  -     mupirocin calcium 2% (BACTROBAN) 2 % cream; Apply topically 2 (two) times daily. for 10 days  Dispense: 30 g; Refill: 1    Sciatic nerve pain, left  -     ketorolac injection 60 mg  -     CBC Auto Differential; Future; Expected date: 10/28/2022  -     Comprehensive Metabolic Panel; Future; Expected date: 10/28/2022  -     C-Reactive Protein; Future; Expected date: 10/28/2022  -     Rheumatoid Factor; Future; Expected date: 10/28/2022  -     Cyclic Citrullinated Peptide Antibody, IgG; Future; Expected date: 10/28/2022  -     Vitamin D; Future; Expected date: 10/28/2022  -     Sedimentation Rate; Future; Expected date: 10/28/2022  -     amLODIPine (NORVASC) 5 MG tablet;  Take 1 tablet (5 mg total) by mouth once daily.  Dispense: 90 tablet; Refill: 3  -     ATELVIA 35 mg TbEC; Take 1 tablet (35 mg total) by mouth every 7 days.  Dispense: 12 tablet; Refill: 3  -     clotrimazole-betamethasone 1-0.05% (LOTRISONE) cream; Apply topically 2 (two) times daily.  Dispense: 45 g; Refill: 3  -     ergocalciferol (ERGOCALCIFEROL) 50,000 unit Cap; Take 1 capsule (50,000 Units total) by mouth every 7 days.  Dispense: 12 capsule; Refill: 3  -     mupirocin calcium 2% (BACTROBAN) 2 % cream; Apply topically 2 (two) times daily. for 10 days  Dispense: 30 g; Refill: 1    Chronic right shoulder pain  -     ketorolac injection 60 mg  -     CBC Auto Differential; Future; Expected date: 10/28/2022  -     Comprehensive Metabolic Panel; Future; Expected date: 10/28/2022  -     C-Reactive Protein; Future; Expected date: 10/28/2022  -     Rheumatoid Factor; Future; Expected date: 10/28/2022  -     Cyclic Citrullinated Peptide Antibody, IgG; Future; Expected date: 10/28/2022  -     Vitamin D; Future; Expected date: 10/28/2022  -     Sedimentation Rate; Future; Expected date: 10/28/2022  -     amLODIPine (NORVASC) 5 MG tablet; Take 1 tablet (5 mg total) by mouth once daily.  Dispense: 90 tablet; Refill: 3  -     ATELVIA 35 mg TbEC; Take 1 tablet (35 mg total) by mouth every 7 days.  Dispense: 12 tablet; Refill: 3  -     clotrimazole-betamethasone 1-0.05% (LOTRISONE) cream; Apply topically 2 (two) times daily.  Dispense: 45 g; Refill: 3  -     ergocalciferol (ERGOCALCIFEROL) 50,000 unit Cap; Take 1 capsule (50,000 Units total) by mouth every 7 days.  Dispense: 12 capsule; Refill: 3  -     mupirocin calcium 2% (BACTROBAN) 2 % cream; Apply topically 2 (two) times daily. for 10 days  Dispense: 30 g; Refill: 1    Disorder of cartilage, unspecified  -     Vitamin D; Future; Expected date: 10/28/2022  -     amLODIPine (NORVASC) 5 MG tablet; Take 1 tablet (5 mg total) by mouth once daily.  Dispense: 90 tablet; Refill:  3  -     ATELVIA 35 mg TbEC; Take 1 tablet (35 mg total) by mouth every 7 days.  Dispense: 12 tablet; Refill: 3  -     clotrimazole-betamethasone 1-0.05% (LOTRISONE) cream; Apply topically 2 (two) times daily.  Dispense: 45 g; Refill: 3  -     ergocalciferol (ERGOCALCIFEROL) 50,000 unit Cap; Take 1 capsule (50,000 Units total) by mouth every 7 days.  Dispense: 12 capsule; Refill: 3  -     mupirocin calcium 2% (BACTROBAN) 2 % cream; Apply topically 2 (two) times daily. for 10 days  Dispense: 30 g; Refill: 1    Primary hypertension  -     amLODIPine (NORVASC) 5 MG tablet; Take 1 tablet (5 mg total) by mouth once daily.  Dispense: 90 tablet; Refill: 3  -     ATELVIA 35 mg TbEC; Take 1 tablet (35 mg total) by mouth every 7 days.  Dispense: 12 tablet; Refill: 3  -     clotrimazole-betamethasone 1-0.05% (LOTRISONE) cream; Apply topically 2 (two) times daily.  Dispense: 45 g; Refill: 3  -     ergocalciferol (ERGOCALCIFEROL) 50,000 unit Cap; Take 1 capsule (50,000 Units total) by mouth every 7 days.  Dispense: 12 capsule; Refill: 3  -     mupirocin calcium 2% (BACTROBAN) 2 % cream; Apply topically 2 (two) times daily. for 10 days  Dispense: 30 g; Refill: 1    Rheumatoid arthritis of hand  -     amLODIPine (NORVASC) 5 MG tablet; Take 1 tablet (5 mg total) by mouth once daily.  Dispense: 90 tablet; Refill: 3  -     ATELVIA 35 mg TbEC; Take 1 tablet (35 mg total) by mouth every 7 days.  Dispense: 12 tablet; Refill: 3  -     clotrimazole-betamethasone 1-0.05% (LOTRISONE) cream; Apply topically 2 (two) times daily.  Dispense: 45 g; Refill: 3  -     ergocalciferol (ERGOCALCIFEROL) 50,000 unit Cap; Take 1 capsule (50,000 Units total) by mouth every 7 days.  Dispense: 12 capsule; Refill: 3  -     mupirocin calcium 2% (BACTROBAN) 2 % cream; Apply topically 2 (two) times daily. for 10 days  Dispense: 30 g; Refill: 1    Primary osteoarthritis of both hips  -     amLODIPine (NORVASC) 5 MG tablet; Take 1 tablet (5 mg total) by mouth  once daily.  Dispense: 90 tablet; Refill: 3  -     ATELVIA 35 mg TbEC; Take 1 tablet (35 mg total) by mouth every 7 days.  Dispense: 12 tablet; Refill: 3  -     clotrimazole-betamethasone 1-0.05% (LOTRISONE) cream; Apply topically 2 (two) times daily.  Dispense: 45 g; Refill: 3  -     ergocalciferol (ERGOCALCIFEROL) 50,000 unit Cap; Take 1 capsule (50,000 Units total) by mouth every 7 days.  Dispense: 12 capsule; Refill: 3  -     mupirocin calcium 2% (BACTROBAN) 2 % cream; Apply topically 2 (two) times daily. for 10 days  Dispense: 30 g; Refill: 1    Osteoporosis, unspecified osteoporosis type, unspecified pathological fracture presence  -     amLODIPine (NORVASC) 5 MG tablet; Take 1 tablet (5 mg total) by mouth once daily.  Dispense: 90 tablet; Refill: 3  -     ATELVIA 35 mg TbEC; Take 1 tablet (35 mg total) by mouth every 7 days.  Dispense: 12 tablet; Refill: 3  -     clotrimazole-betamethasone 1-0.05% (LOTRISONE) cream; Apply topically 2 (two) times daily.  Dispense: 45 g; Refill: 3  -     ergocalciferol (ERGOCALCIFEROL) 50,000 unit Cap; Take 1 capsule (50,000 Units total) by mouth every 7 days.  Dispense: 12 capsule; Refill: 3  -     mupirocin calcium 2% (BACTROBAN) 2 % cream; Apply topically 2 (two) times daily. for 10 days  Dispense: 30 g; Refill: 1    Gastroesophageal reflux disease with esophagitis  -     amLODIPine (NORVASC) 5 MG tablet; Take 1 tablet (5 mg total) by mouth once daily.  Dispense: 90 tablet; Refill: 3  -     ATELVIA 35 mg TbEC; Take 1 tablet (35 mg total) by mouth every 7 days.  Dispense: 12 tablet; Refill: 3  -     clotrimazole-betamethasone 1-0.05% (LOTRISONE) cream; Apply topically 2 (two) times daily.  Dispense: 45 g; Refill: 3  -     ergocalciferol (ERGOCALCIFEROL) 50,000 unit Cap; Take 1 capsule (50,000 Units total) by mouth every 7 days.  Dispense: 12 capsule; Refill: 3  -     mupirocin calcium 2% (BACTROBAN) 2 % cream; Apply topically 2 (two) times daily. for 10 days  Dispense: 30 g;  Refill: 1    Rash  -     clotrimazole-betamethasone 1-0.05% (LOTRISONE) cream; Apply topically 2 (two) times daily.  Dispense: 45 g; Refill: 3        Assessment:  66 year old female  Seropositive (+RF) rheumatoid arthritis on plaquenil  --vitamin D insufficiency  --HTN    Plan:  1. toradol 60, dexa 4 given today  2. Cont plaquenil 200 mg bid  3. Cont duexis tid PRN. D/C arthrotec  4. Norvasc and meclizine refilled  5. Ergo 50,000 once weekly

## 2022-10-28 NOTE — PROGRESS NOTES
2 pt identifiers used  Allergies reviewed      Administered 2 cc ( 30 mg/ml ) of toradol to the right upper outer gluteal. Patient tolerated injections well. Informed of s/s to report verbalized understanding. No adverse reactions noted. Left facility in stable condition.

## 2022-11-13 LAB
25(OH)D3+25(OH)D2 SERPL-MCNC: 79.4 NG/ML (ref 30–100)
ALBUMIN SERPL-MCNC: 4 G/DL (ref 3.8–4.8)
ALBUMIN/GLOB SERPL: 1.6 {RATIO} (ref 1.2–2.2)
ALP SERPL-CCNC: 119 IU/L (ref 44–121)
ALT SERPL-CCNC: 8 IU/L (ref 0–32)
AST SERPL-CCNC: 12 IU/L (ref 0–40)
BASOPHILS # BLD AUTO: 0.1 X10E3/UL (ref 0–0.2)
BASOPHILS NFR BLD AUTO: 1 %
BILIRUB SERPL-MCNC: 0.4 MG/DL (ref 0–1.2)
BUN SERPL-MCNC: 17 MG/DL (ref 8–27)
BUN/CREAT SERPL: 26 (ref 12–28)
CALCIUM SERPL-MCNC: 9.5 MG/DL (ref 8.7–10.3)
CCP IGA+IGG SERPL IA-ACNC: <20 UNITS
CHLORIDE SERPL-SCNC: 104 MMOL/L (ref 96–106)
CO2 SERPL-SCNC: 21 MMOL/L (ref 20–29)
CREAT SERPL-MCNC: 0.66 MG/DL (ref 0.57–1)
CRP SERPL-MCNC: 7 MG/L (ref 0–10)
EOSINOPHIL # BLD AUTO: 0.3 X10E3/UL (ref 0–0.4)
EOSINOPHIL NFR BLD AUTO: 2 %
ERYTHROCYTE [DISTWIDTH] IN BLOOD BY AUTOMATED COUNT: 14.2 % (ref 11.7–15.4)
ERYTHROCYTE [SEDIMENTATION RATE] IN BLOOD BY WESTERGREN METHOD: 51 MM/HR (ref 0–40)
EST. GFR  (NO RACE VARIABLE): 96 ML/MIN/1.73
GLOBULIN SER CALC-MCNC: 2.5 G/DL (ref 1.5–4.5)
GLUCOSE SERPL-MCNC: 79 MG/DL (ref 70–99)
HCT VFR BLD AUTO: 42.9 % (ref 34–46.6)
HGB BLD-MCNC: 13.7 G/DL (ref 11.1–15.9)
IMM GRANULOCYTES NFR BLD AUTO: 1 %
LYMPHOCYTES # BLD AUTO: 2.7 X10E3/UL (ref 0.7–3.1)
LYMPHOCYTES NFR BLD AUTO: 19 %
MCH RBC QN AUTO: 29.7 PG (ref 26.6–33)
MCHC RBC AUTO-ENTMCNC: 31.9 G/DL (ref 31.5–35.7)
MCV RBC AUTO: 93 FL (ref 79–97)
MONOCYTES # BLD AUTO: 1.4 X10E3/UL (ref 0.1–0.9)
MONOCYTES NFR BLD AUTO: 10 %
NEUTROPHILS # BLD AUTO: 9.9 X10E3/UL (ref 1.4–7)
NEUTROPHILS NFR BLD AUTO: 67 %
PLATELET # BLD AUTO: 259 X10E3/UL (ref 150–450)
POTASSIUM SERPL-SCNC: 3.8 MMOL/L (ref 3.5–5.2)
PROT SERPL-MCNC: 6.5 G/DL (ref 6–8.5)
RBC # BLD AUTO: 4.62 X10E6/UL (ref 3.77–5.28)
RHEUMATOID FACT SERPL-ACNC: 18.4 IU/ML
SODIUM SERPL-SCNC: 141 MMOL/L (ref 134–144)
WBC # BLD AUTO: 14.5 X10E3/UL (ref 3.4–10.8)

## 2022-11-14 ENCOUNTER — TELEPHONE (OUTPATIENT)
Dept: RHEUMATOLOGY | Facility: CLINIC | Age: 67
End: 2022-11-14
Payer: MEDICARE

## 2023-01-03 DIAGNOSIS — M47.817 LUMBAR AND SACRAL OSTEOARTHRITIS: ICD-10-CM

## 2023-01-08 RX ORDER — GABAPENTIN 100 MG/1
CAPSULE ORAL
Qty: 90 CAPSULE | Refills: 5 | Status: SHIPPED | OUTPATIENT
Start: 2023-01-08 | End: 2023-02-24 | Stop reason: SDUPTHER

## 2023-02-24 ENCOUNTER — OFFICE VISIT (OUTPATIENT)
Dept: RHEUMATOLOGY | Facility: CLINIC | Age: 68
End: 2023-02-24
Payer: MEDICARE

## 2023-02-24 DIAGNOSIS — M16.0 PRIMARY OSTEOARTHRITIS OF BOTH HIPS: ICD-10-CM

## 2023-02-24 DIAGNOSIS — M47.817 LUMBAR AND SACRAL OSTEOARTHRITIS: ICD-10-CM

## 2023-02-24 DIAGNOSIS — R73.9 HYPERGLYCEMIA, UNSPECIFIED: ICD-10-CM

## 2023-02-24 DIAGNOSIS — K21.00 GASTROESOPHAGEAL REFLUX DISEASE WITH ESOPHAGITIS WITHOUT HEMORRHAGE: ICD-10-CM

## 2023-02-24 DIAGNOSIS — M19.90 OSTEOARTHRITIS, UNSPECIFIED OSTEOARTHRITIS TYPE, UNSPECIFIED SITE: ICD-10-CM

## 2023-02-24 DIAGNOSIS — M17.0 PRIMARY OSTEOARTHRITIS OF BOTH KNEES: ICD-10-CM

## 2023-02-24 DIAGNOSIS — M05.9 SEROPOSITIVE RHEUMATOID ARTHRITIS: Primary | ICD-10-CM

## 2023-02-24 DIAGNOSIS — R94.6 ABNORMAL RESULTS OF THYROID FUNCTION STUDIES: ICD-10-CM

## 2023-02-24 DIAGNOSIS — M54.32 SCIATIC NERVE PAIN, LEFT: ICD-10-CM

## 2023-02-24 DIAGNOSIS — M06.9 RHEUMATOID ARTHRITIS FLARE: ICD-10-CM

## 2023-02-24 DIAGNOSIS — M81.0 OSTEOPOROSIS, UNSPECIFIED OSTEOPOROSIS TYPE, UNSPECIFIED PATHOLOGICAL FRACTURE PRESENCE: ICD-10-CM

## 2023-02-24 PROCEDURE — 99215 PR OFFICE/OUTPT VISIT, EST, LEVL V, 40-54 MIN: ICD-10-PCS | Mod: 95,,, | Performed by: INTERNAL MEDICINE

## 2023-02-24 PROCEDURE — 99215 OFFICE O/P EST HI 40 MIN: CPT | Mod: 95,,, | Performed by: INTERNAL MEDICINE

## 2023-02-24 RX ORDER — HYDROXYCHLOROQUINE SULFATE 200 MG/1
200 TABLET, FILM COATED ORAL 2 TIMES DAILY
Qty: 180 TABLET | Refills: 0 | Status: SHIPPED | OUTPATIENT
Start: 2023-02-24 | End: 2023-08-01

## 2023-02-24 RX ORDER — IBUPROFEN AND FAMOTIDINE 26.6; 8 MG/1; MG/1
1 TABLET ORAL 3 TIMES DAILY PRN
Qty: 90 TABLET | Refills: 5 | Status: SHIPPED | OUTPATIENT
Start: 2023-02-24 | End: 2023-07-07 | Stop reason: SDUPTHER

## 2023-02-24 RX ORDER — GABAPENTIN 100 MG/1
100 CAPSULE ORAL 3 TIMES DAILY
Qty: 270 CAPSULE | Refills: 3 | Status: SHIPPED | OUTPATIENT
Start: 2023-02-24 | End: 2023-12-28 | Stop reason: SDUPTHER

## 2023-02-24 NOTE — PROGRESS NOTES
Subjective:       Patient ID: Gwendkatrin Valencia is a 67 y.o. female.    Chief Complaint: No chief complaint on file.      Follow up: 67 year old female who presents to clinic for follow up on seropositive rheumatoid arthritis. She is taking duexis and plaquenil twice daily and feels her arthritis is stable on this regimen. She had an episode sob and L arm numbness and  Sob chest xray showed interstitial marking, elevated wbc count saw Gabapentin helps some with L sided sciatic pain--only tried 1 tab nightly thus far. R shoulder is painful. Current tx:  1. Plaquenil  2. Duexis    Review of Systems   Constitutional:  Positive for activity change and fatigue. Negative for appetite change and chills.   Eyes:  Negative for visual disturbance.   Respiratory:  Negative for cough and shortness of breath.    Cardiovascular:  Negative for chest pain, palpitations and leg swelling.   Gastrointestinal:  Negative for abdominal pain, constipation, diarrhea, nausea and vomiting.   Musculoskeletal:  Positive for arthralgias.   Neurological:  Negative for dizziness, weakness and light-headedness.       Objective:     There were no vitals filed for this visit.      Past Medical History:   Diagnosis Date    Acid reflux     GERD (gastroesophageal reflux disease)     Hypertension     Osteoporosis     Rheumatoid arthritis(714.0)     Rheumatoid arthritis     Past Surgical History:   Procedure Laterality Date    ADENOIDECTOMY      ANKLE LIGAMENT RECONSTRUCTION      CARPAL TUNNEL RELEASE Right     CHOLECYSTECTOMY      HYSTERECTOMY            Physical Exam   Constitutional: She is oriented to person, place, and time.   Neurological: She is alert and oriented to person, place, and time.   Psychiatric: Mood, affect and judgment normal.   cbc 12, troponin wnl,  Esr 34  elevated wbc elevated 12  Assessment:       1. Seropositive rheumatoid arthritis    2. Osteoarthritis, unspecified osteoarthritis type, unspecified site    3. Sciatic nerve  pain, left    4. Primary osteoarthritis of both hips    5. Gastroesophageal reflux disease with esophagitis without hemorrhage    6. Rheumatoid arthritis flare    7. Osteoporosis, unspecified osteoporosis type, unspecified pathological fracture presence    8. Lumbar and sacral osteoarthritis    9. Primary osteoarthritis of both knees    10. Hyperglycemia, unspecified    11. Abnormal results of thyroid function studies                Plan:       Seropositive rheumatoid arthritis  -     ibuprofen-famotidine (DUEXIS) 800-26.6 mg Tab; Take 1 tablet by mouth 3 (three) times daily as needed (pain).  Dispense: 90 tablet; Refill: 5  -     Comprehensive Metabolic Panel; Future; Expected date: 02/24/2023  -     CBC Auto Differential; Future; Expected date: 02/24/2023  -     C-Reactive Protein; Future; Expected date: 02/24/2023  -     TSH; Future; Expected date: 02/24/2023  -     T4, Free; Future; Expected date: 02/24/2023  -     T3; Future; Expected date: 02/24/2023  -     Hemoglobin A1C; Future; Expected date: 02/24/2023  -     Rheumatoid Factor; Future; Expected date: 02/24/2023  -     Sedimentation Rate; Future; Expected date: 02/24/2023    Osteoarthritis, unspecified osteoarthritis type, unspecified site  -     Comprehensive Metabolic Panel; Future; Expected date: 02/24/2023  -     CBC Auto Differential; Future; Expected date: 02/24/2023  -     C-Reactive Protein; Future; Expected date: 02/24/2023  -     TSH; Future; Expected date: 02/24/2023  -     T4, Free; Future; Expected date: 02/24/2023  -     T3; Future; Expected date: 02/24/2023  -     Hemoglobin A1C; Future; Expected date: 02/24/2023  -     Rheumatoid Factor; Future; Expected date: 02/24/2023  -     Sedimentation Rate; Future; Expected date: 02/24/2023    Sciatic nerve pain, left  -     Comprehensive Metabolic Panel; Future; Expected date: 02/24/2023  -     CBC Auto Differential; Future; Expected date: 02/24/2023  -     C-Reactive Protein; Future; Expected date:  02/24/2023  -     TSH; Future; Expected date: 02/24/2023  -     T4, Free; Future; Expected date: 02/24/2023  -     T3; Future; Expected date: 02/24/2023  -     Hemoglobin A1C; Future; Expected date: 02/24/2023  -     Rheumatoid Factor; Future; Expected date: 02/24/2023  -     Sedimentation Rate; Future; Expected date: 02/24/2023    Primary osteoarthritis of both hips  -     hydrOXYchloroQUINE (PLAQUENIL) 200 mg tablet; Take 1 tablet (200 mg total) by mouth 2 (two) times daily.  Dispense: 180 tablet; Refill: 0  -     Comprehensive Metabolic Panel; Future; Expected date: 02/24/2023  -     CBC Auto Differential; Future; Expected date: 02/24/2023  -     C-Reactive Protein; Future; Expected date: 02/24/2023  -     TSH; Future; Expected date: 02/24/2023  -     T4, Free; Future; Expected date: 02/24/2023  -     T3; Future; Expected date: 02/24/2023  -     Hemoglobin A1C; Future; Expected date: 02/24/2023  -     Rheumatoid Factor; Future; Expected date: 02/24/2023  -     Sedimentation Rate; Future; Expected date: 02/24/2023    Gastroesophageal reflux disease with esophagitis without hemorrhage  -     hydrOXYchloroQUINE (PLAQUENIL) 200 mg tablet; Take 1 tablet (200 mg total) by mouth 2 (two) times daily.  Dispense: 180 tablet; Refill: 0  -     Comprehensive Metabolic Panel; Future; Expected date: 02/24/2023  -     CBC Auto Differential; Future; Expected date: 02/24/2023  -     C-Reactive Protein; Future; Expected date: 02/24/2023  -     TSH; Future; Expected date: 02/24/2023  -     T4, Free; Future; Expected date: 02/24/2023  -     T3; Future; Expected date: 02/24/2023  -     Hemoglobin A1C; Future; Expected date: 02/24/2023  -     Rheumatoid Factor; Future; Expected date: 02/24/2023  -     Sedimentation Rate; Future; Expected date: 02/24/2023    Rheumatoid arthritis flare  -     ibuprofen-famotidine (DUEXIS) 800-26.6 mg Tab; Take 1 tablet by mouth 3 (three) times daily as needed (pain).  Dispense: 90 tablet; Refill:  5    Osteoporosis, unspecified osteoporosis type, unspecified pathological fracture presence  -     hydrOXYchloroQUINE (PLAQUENIL) 200 mg tablet; Take 1 tablet (200 mg total) by mouth 2 (two) times daily.  Dispense: 180 tablet; Refill: 0  -     Comprehensive Metabolic Panel; Future; Expected date: 02/24/2023  -     CBC Auto Differential; Future; Expected date: 02/24/2023  -     C-Reactive Protein; Future; Expected date: 02/24/2023  -     TSH; Future; Expected date: 02/24/2023  -     T4, Free; Future; Expected date: 02/24/2023  -     T3; Future; Expected date: 02/24/2023  -     Hemoglobin A1C; Future; Expected date: 02/24/2023  -     Rheumatoid Factor; Future; Expected date: 02/24/2023  -     Sedimentation Rate; Future; Expected date: 02/24/2023    Lumbar and sacral osteoarthritis  -     gabapentin (NEURONTIN) 100 MG capsule; Take 1 capsule (100 mg total) by mouth 3 (three) times daily. TAKE (1) CAPSULE BY MOUTH THREE TIMES DAILY.  Dispense: 270 capsule; Refill: 3  -     Comprehensive Metabolic Panel; Future; Expected date: 02/24/2023  -     CBC Auto Differential; Future; Expected date: 02/24/2023  -     C-Reactive Protein; Future; Expected date: 02/24/2023  -     TSH; Future; Expected date: 02/24/2023  -     T4, Free; Future; Expected date: 02/24/2023  -     T3; Future; Expected date: 02/24/2023  -     Hemoglobin A1C; Future; Expected date: 02/24/2023  -     Rheumatoid Factor; Future; Expected date: 02/24/2023  -     Sedimentation Rate; Future; Expected date: 02/24/2023    Primary osteoarthritis of both knees  -     ibuprofen-famotidine (DUEXIS) 800-26.6 mg Tab; Take 1 tablet by mouth 3 (three) times daily as needed (pain).  Dispense: 90 tablet; Refill: 5  -     Comprehensive Metabolic Panel; Future; Expected date: 02/24/2023  -     CBC Auto Differential; Future; Expected date: 02/24/2023  -     C-Reactive Protein; Future; Expected date: 02/24/2023  -     TSH; Future; Expected date: 02/24/2023  -     T4, Free;  Future; Expected date: 02/24/2023  -     T3; Future; Expected date: 02/24/2023  -     Hemoglobin A1C; Future; Expected date: 02/24/2023  -     Rheumatoid Factor; Future; Expected date: 02/24/2023  -     Sedimentation Rate; Future; Expected date: 02/24/2023    Hyperglycemia, unspecified  -     Hemoglobin A1C; Future; Expected date: 02/24/2023    Abnormal results of thyroid function studies  -     Comprehensive Metabolic Panel; Future; Expected date: 02/24/2023  -     CBC Auto Differential; Future; Expected date: 02/24/2023  -     C-Reactive Protein; Future; Expected date: 02/24/2023  -     TSH; Future; Expected date: 02/24/2023  -     T4, Free; Future; Expected date: 02/24/2023  -     T3; Future; Expected date: 02/24/2023  -     Hemoglobin A1C; Future; Expected date: 02/24/2023  -     Rheumatoid Factor; Future; Expected date: 02/24/2023  -     Sedimentation Rate; Future; Expected date: 02/24/2023            Assessment:  66 year old female  Seropositive (+RF) rheumatoid arthritis on plaquenil  --vitamin D insufficiency  --HTN    Plan:  1. Duexis reordered pt tried lodine, arthrotec mobic, celebrex naproxyn  2. Cont plaquenil 200 mg bid  3. Cont duexis tid PRN.4. Norvasc and meclizine refilled  5. Ergo 50,000 once weekly  6. Dexilant  works she tried  nexium, protonix and pepcid  and prilosec    The patient location is: home  The chief complaint leading to consultation is: ra    Visit type: audiovisual    Face to Face time with patient: 45   minutes of total time spent on the encounter, which includes face to face time and non-face to face time preparing to see the patient (eg, review of tests), Obtaining and/or reviewing separately obtained history, Documenting clinical information in the electronic or other health record, Independently interpreting results (not separately reported) and communicating results to the patient/family/caregiver, or Care coordination (not separately reported).         Each patient to whom  he or she provides medical services by telemedicine is:  (1) informed of the relationship between the physician and patient and the respective role of any other health care provider with respect to management of the patient; and (2) notified that he or she may decline to receive medical services by telemedicine and may withdraw from such care at any time.    Notes:

## 2023-03-06 DIAGNOSIS — M19.90 OSTEOARTHRITIS, UNSPECIFIED OSTEOARTHRITIS TYPE, UNSPECIFIED SITE: ICD-10-CM

## 2023-03-06 DIAGNOSIS — M05.9 SEROPOSITIVE RHEUMATOID ARTHRITIS: Primary | ICD-10-CM

## 2023-03-06 RX ORDER — FAMOTIDINE 40 MG/1
40 TABLET, FILM COATED ORAL DAILY
Qty: 30 TABLET | Refills: 11 | Status: CANCELLED | OUTPATIENT
Start: 2023-03-06 | End: 2024-03-05

## 2023-03-06 NOTE — TELEPHONE ENCOUNTER
----- Message from Geena Escalera sent at 3/6/2023  1:52 PM CST -----  Contact: zion  Type:  Pharmacy Calling to Clarify an RX    Name of Caller:  Zion  Pharmacy Name:    [facility] [place] [address]  [place] [address]  Phone: [phone] Fax: [phone]  Prescription Name:  ibuprofen-famotidine (DUEXIS) 800-26.6 mg Tab  What do they need to clarify?:  copay is not affordable- can prescription be split the drug up into separate prescriptions because it will be cheaper  Best Call Back Number:  [phone]  Additional Information:

## 2023-03-07 RX ORDER — IBUPROFEN 800 MG/1
800 TABLET ORAL 3 TIMES DAILY PRN
Qty: 90 TABLET | Refills: 11 | Status: SHIPPED | OUTPATIENT
Start: 2023-03-07 | End: 2023-12-28

## 2023-03-07 RX ORDER — FAMOTIDINE 20 MG/1
20 TABLET, FILM COATED ORAL 2 TIMES DAILY
Qty: 60 TABLET | Refills: 11 | Status: SHIPPED | OUTPATIENT
Start: 2023-03-07 | End: 2024-03-06

## 2023-03-15 RX ORDER — FLUCONAZOLE 150 MG/1
150 TABLET ORAL DAILY
COMMUNITY
Start: 2023-03-14 | End: 2023-03-15 | Stop reason: SDUPTHER

## 2023-03-24 RX ORDER — FLUCONAZOLE 150 MG/1
150 TABLET ORAL DAILY
Qty: 7 TABLET | Refills: 4 | Status: SHIPPED | OUTPATIENT
Start: 2023-03-24 | End: 2023-03-31

## 2023-03-28 ENCOUNTER — TELEPHONE (OUTPATIENT)
Dept: RHEUMATOLOGY | Facility: CLINIC | Age: 68
End: 2023-03-28

## 2023-03-29 ENCOUNTER — CLINICAL SUPPORT (OUTPATIENT)
Dept: RHEUMATOLOGY | Facility: CLINIC | Age: 68
End: 2023-03-29
Payer: MEDICARE

## 2023-03-29 DIAGNOSIS — M16.0 PRIMARY OSTEOARTHRITIS OF BOTH HIPS: ICD-10-CM

## 2023-03-29 DIAGNOSIS — M19.90 OSTEOARTHRITIS, UNSPECIFIED OSTEOARTHRITIS TYPE, UNSPECIFIED SITE: ICD-10-CM

## 2023-03-29 DIAGNOSIS — M05.9 SEROPOSITIVE RHEUMATOID ARTHRITIS: Primary | ICD-10-CM

## 2023-03-29 DIAGNOSIS — M06.9 RHEUMATOID ARTHRITIS FLARE: ICD-10-CM

## 2023-03-29 PROCEDURE — 96372 PR INJECTION,THERAP/PROPH/DIAG2ST, IM OR SUBCUT: ICD-10-PCS | Mod: S$GLB,,, | Performed by: INTERNAL MEDICINE

## 2023-03-29 PROCEDURE — 96372 THER/PROPH/DIAG INJ SC/IM: CPT | Mod: S$GLB,,, | Performed by: INTERNAL MEDICINE

## 2023-03-29 RX ORDER — METHYLPREDNISOLONE ACETATE 80 MG/ML
80 INJECTION, SUSPENSION INTRA-ARTICULAR; INTRALESIONAL; INTRAMUSCULAR; SOFT TISSUE
Status: COMPLETED | OUTPATIENT
Start: 2023-03-29 | End: 2023-03-29

## 2023-03-29 RX ORDER — CYANOCOBALAMIN 1000 UG/ML
1000 INJECTION, SOLUTION INTRAMUSCULAR; SUBCUTANEOUS
Status: COMPLETED | OUTPATIENT
Start: 2023-03-29 | End: 2023-03-29

## 2023-03-29 RX ORDER — KETOROLAC TROMETHAMINE 30 MG/ML
60 INJECTION, SOLUTION INTRAMUSCULAR; INTRAVENOUS
Status: COMPLETED | OUTPATIENT
Start: 2023-03-29 | End: 2023-03-29

## 2023-03-29 RX ADMIN — CYANOCOBALAMIN 1000 MCG: 1000 INJECTION, SOLUTION INTRAMUSCULAR; SUBCUTANEOUS at 02:03

## 2023-03-29 RX ADMIN — KETOROLAC TROMETHAMINE 60 MG: 30 INJECTION, SOLUTION INTRAMUSCULAR; INTRAVENOUS at 02:03

## 2023-03-29 RX ADMIN — METHYLPREDNISOLONE ACETATE 80 MG: 80 INJECTION, SUSPENSION INTRA-ARTICULAR; INTRALESIONAL; INTRAMUSCULAR; SOFT TISSUE at 02:03

## 2023-03-29 NOTE — PROGRESS NOTES
Patient arrived to clinic for nurse visit. 2 patient identifiers full name and date of birth verified by patient. Allergies verified orders verified.    BP: 151/76 pulse 69          Toradol 60 administered right upper outer gluteal    Depo-Medrol 80 administered left outer gluteal    B-12 administer right arm.

## 2023-04-11 ENCOUNTER — TELEPHONE (OUTPATIENT)
Dept: RHEUMATOLOGY | Facility: CLINIC | Age: 68
End: 2023-04-11
Payer: MEDICARE

## 2023-04-11 NOTE — TELEPHONE ENCOUNTER
Contacted patient to see what office needs to call pharmacy for. Patient stated pharmacy needs PA for duexis. Nurse stated will call pharmacy have PA request faxed to office. Verbalized understanding. Attempted to contact pharmacy regarding PA unable to reach no answer.

## 2023-04-11 NOTE — TELEPHONE ENCOUNTER
----- Message from Vidhya Weaver sent at 4/11/2023  1:32 PM CDT -----  Contact: pt  Type: Needs Medical Advice  Who Called:  pt     Best Call Back Number: [phone]    Additional Information: pt would like to speak with staff regarding her ibuprofen-famotidine (DUEXIS) 800-26.6 mg Tab she states or can you guys speak with her pharmacy . Please advise.         [facility] [place] [address]  [place] [address]  Phone: [phone] Fax: [phone]

## 2023-05-04 DIAGNOSIS — G89.29 CHRONIC RIGHT SHOULDER PAIN: ICD-10-CM

## 2023-05-04 DIAGNOSIS — M16.0 PRIMARY OSTEOARTHRITIS OF BOTH HIPS: ICD-10-CM

## 2023-05-04 DIAGNOSIS — M25.511 CHRONIC RIGHT SHOULDER PAIN: ICD-10-CM

## 2023-05-04 DIAGNOSIS — M94.9 DISORDER OF CARTILAGE, UNSPECIFIED: ICD-10-CM

## 2023-05-04 DIAGNOSIS — M05.9 SEROPOSITIVE RHEUMATOID ARTHRITIS: ICD-10-CM

## 2023-05-04 DIAGNOSIS — M19.90 OSTEOARTHRITIS, UNSPECIFIED OSTEOARTHRITIS TYPE, UNSPECIFIED SITE: ICD-10-CM

## 2023-05-04 DIAGNOSIS — M81.0 OSTEOPOROSIS, UNSPECIFIED OSTEOPOROSIS TYPE, UNSPECIFIED PATHOLOGICAL FRACTURE PRESENCE: ICD-10-CM

## 2023-05-04 DIAGNOSIS — K21.00 GASTROESOPHAGEAL REFLUX DISEASE WITH ESOPHAGITIS WITHOUT HEMORRHAGE: ICD-10-CM

## 2023-05-04 DIAGNOSIS — I10 PRIMARY HYPERTENSION: ICD-10-CM

## 2023-05-04 DIAGNOSIS — M54.32 SCIATIC NERVE PAIN, LEFT: ICD-10-CM

## 2023-05-04 DIAGNOSIS — B37.9 CANDIDIASIS: ICD-10-CM

## 2023-05-05 NOTE — TELEPHONE ENCOUNTER
----- Message from Joan Toussaint sent at 5/5/2023 10:54 AM CDT -----  Regarding: patient call back  Contact: PATIENT  Name of Who is Calling: CHRISTOFER TORREZ [[id number]]      What is the request in detail: Patient is requesting a call back to speak with a nurse. Patient states she sent a message a couple days ago. She was seen in the ER recently and may have to go back. Patient would rather speak with the office staff regarding her concerns. Please advise!         Can the clinic reply by [facility]: NO        [facility]: [phone]

## 2023-05-09 RX ORDER — RISEDRONATE SODIUM 30.1; 4.9 MG/1; MG/1
1 TABLET, DELAYED RELEASE ORAL
Qty: 12 TABLET | Refills: 3 | Status: SHIPPED | OUTPATIENT
Start: 2023-05-09 | End: 2024-05-08

## 2023-05-09 RX ORDER — RISEDRONATE SODIUM 35 MG/1
TABLET, FILM COATED ORAL
Qty: 4 TABLET | Refills: 0 | OUTPATIENT
Start: 2023-05-09

## 2023-07-07 ENCOUNTER — TELEPHONE (OUTPATIENT)
Dept: PHARMACY | Facility: CLINIC | Age: 68
End: 2023-07-07
Payer: MEDICARE

## 2023-07-07 ENCOUNTER — TELEPHONE (OUTPATIENT)
Dept: RHEUMATOLOGY | Facility: CLINIC | Age: 68
End: 2023-07-07
Payer: MEDICARE

## 2023-07-07 DIAGNOSIS — M05.9 SEROPOSITIVE RHEUMATOID ARTHRITIS: ICD-10-CM

## 2023-07-07 DIAGNOSIS — M06.9 RHEUMATOID ARTHRITIS FLARE: ICD-10-CM

## 2023-07-07 DIAGNOSIS — M17.0 PRIMARY OSTEOARTHRITIS OF BOTH KNEES: ICD-10-CM

## 2023-07-07 RX ORDER — IBUPROFEN AND FAMOTIDINE 26.6; 8 MG/1; MG/1
1 TABLET ORAL 3 TIMES DAILY PRN
Qty: 90 TABLET | Refills: 5 | Status: SHIPPED | OUTPATIENT
Start: 2023-07-07 | End: 2024-01-08

## 2023-07-12 ENCOUNTER — TELEPHONE (OUTPATIENT)
Dept: RHEUMATOLOGY | Facility: CLINIC | Age: 68
End: 2023-07-12
Payer: MEDICARE

## 2023-07-12 DIAGNOSIS — M05.9 SEROPOSITIVE RHEUMATOID ARTHRITIS: ICD-10-CM

## 2023-07-12 DIAGNOSIS — M06.9 RHEUMATOID ARTHRITIS FLARE: ICD-10-CM

## 2023-07-12 DIAGNOSIS — M17.0 PRIMARY OSTEOARTHRITIS OF BOTH KNEES: ICD-10-CM

## 2023-07-12 RX ORDER — IBUPROFEN AND FAMOTIDINE 26.6; 8 MG/1; MG/1
1 TABLET ORAL 3 TIMES DAILY PRN
Qty: 90 TABLET | Refills: 5 | Status: CANCELLED | OUTPATIENT
Start: 2023-07-12

## 2023-07-12 NOTE — TELEPHONE ENCOUNTER
"----- Message from Kerry Atkinson sent at 7/12/2023  2:39 PM CDT -----  Contact: Patient  Type:  RX Refill Request    Who Called:   Patient  Refill or New Rx:  Refill    RX Name and Strength:  ibuprofen-famotidine (DUEXIS) 800-26.6 mg Tab    Preferred Pharmacy with phone number:      [facility] [place] - [address]  [address]  [address]  [place] [address]  Phone: [phone] Fax: [phone]    [place], [place] - [address]  [address] [address]  Phone: [phone] Fax: [phone]      Local or Mail Order:  Mail Order  Ordering Provider:   Dr Pulido    Would the patient rather a call back or a response via [facility]?   Call back  Best Call Back Number:  [phone]    Additional Information: States she will be out of this medication today - states Dr Pulido told her last week (7/7) that she would have the medication shipped to her - states she expected it "ASAP" and hasn't gotten it yet - states she would like to speak with someone in the office - please call to advise - thank you        "

## 2023-07-12 NOTE — TELEPHONE ENCOUNTER
Returned patient call and informed Duexis was sent to [facility] pharmacy in [place] on 7-7-2023. Patient stated she never received a phone call to set up delivery. Nurse advised will contact pharmacy to see if they can ship it to [facility]. Verbalized understanding.

## 2023-07-31 DIAGNOSIS — M81.0 OSTEOPOROSIS, UNSPECIFIED OSTEOPOROSIS TYPE, UNSPECIFIED PATHOLOGICAL FRACTURE PRESENCE: ICD-10-CM

## 2023-07-31 DIAGNOSIS — M16.0 PRIMARY OSTEOARTHRITIS OF BOTH HIPS: ICD-10-CM

## 2023-07-31 DIAGNOSIS — K21.00 GASTROESOPHAGEAL REFLUX DISEASE WITH ESOPHAGITIS WITHOUT HEMORRHAGE: ICD-10-CM

## 2023-08-01 RX ORDER — HYDROXYCHLOROQUINE SULFATE 200 MG/1
200 TABLET, FILM COATED ORAL 2 TIMES DAILY
Qty: 180 TABLET | Refills: 0 | Status: SHIPPED | OUTPATIENT
Start: 2023-08-01 | End: 2023-10-23 | Stop reason: SDUPTHER

## 2023-09-15 RX ORDER — FLUCONAZOLE 150 MG/1
150 TABLET ORAL
Qty: 7 TABLET | Refills: 3 | Status: SHIPPED | OUTPATIENT
Start: 2023-09-15 | End: 2023-11-02 | Stop reason: SDUPTHER

## 2023-09-26 DIAGNOSIS — B37.9 CANDIDIASIS: ICD-10-CM

## 2023-09-26 DIAGNOSIS — M54.32 SCIATIC NERVE PAIN, LEFT: ICD-10-CM

## 2023-09-26 DIAGNOSIS — I10 PRIMARY HYPERTENSION: ICD-10-CM

## 2023-09-26 DIAGNOSIS — M81.0 OSTEOPOROSIS, UNSPECIFIED OSTEOPOROSIS TYPE, UNSPECIFIED PATHOLOGICAL FRACTURE PRESENCE: ICD-10-CM

## 2023-09-26 DIAGNOSIS — R21 RASH: ICD-10-CM

## 2023-09-26 DIAGNOSIS — M25.511 CHRONIC RIGHT SHOULDER PAIN: ICD-10-CM

## 2023-09-26 DIAGNOSIS — K21.00 GASTROESOPHAGEAL REFLUX DISEASE WITH ESOPHAGITIS WITHOUT HEMORRHAGE: ICD-10-CM

## 2023-09-26 DIAGNOSIS — M19.90 OSTEOARTHRITIS, UNSPECIFIED OSTEOARTHRITIS TYPE, UNSPECIFIED SITE: ICD-10-CM

## 2023-09-26 DIAGNOSIS — G89.29 CHRONIC RIGHT SHOULDER PAIN: ICD-10-CM

## 2023-09-26 DIAGNOSIS — M16.0 PRIMARY OSTEOARTHRITIS OF BOTH HIPS: ICD-10-CM

## 2023-09-26 DIAGNOSIS — M05.9 SEROPOSITIVE RHEUMATOID ARTHRITIS: ICD-10-CM

## 2023-09-26 DIAGNOSIS — M94.9 DISORDER OF CARTILAGE, UNSPECIFIED: ICD-10-CM

## 2023-09-26 RX ORDER — CLOTRIMAZOLE AND BETAMETHASONE DIPROPIONATE 10; .64 MG/G; MG/G
CREAM TOPICAL 2 TIMES DAILY
Qty: 45 G | Refills: 3 | Status: SHIPPED | OUTPATIENT
Start: 2023-09-26

## 2023-10-06 DIAGNOSIS — M54.32 SCIATIC NERVE PAIN, LEFT: ICD-10-CM

## 2023-10-06 DIAGNOSIS — K21.00 GASTROESOPHAGEAL REFLUX DISEASE WITH ESOPHAGITIS WITHOUT HEMORRHAGE: ICD-10-CM

## 2023-10-06 DIAGNOSIS — M19.90 OSTEOARTHRITIS, UNSPECIFIED OSTEOARTHRITIS TYPE, UNSPECIFIED SITE: ICD-10-CM

## 2023-10-06 DIAGNOSIS — M94.9 DISORDER OF CARTILAGE, UNSPECIFIED: ICD-10-CM

## 2023-10-06 DIAGNOSIS — B37.9 CANDIDIASIS: ICD-10-CM

## 2023-10-06 DIAGNOSIS — M16.0 PRIMARY OSTEOARTHRITIS OF BOTH HIPS: ICD-10-CM

## 2023-10-06 DIAGNOSIS — M25.511 CHRONIC RIGHT SHOULDER PAIN: ICD-10-CM

## 2023-10-06 DIAGNOSIS — I10 PRIMARY HYPERTENSION: ICD-10-CM

## 2023-10-06 DIAGNOSIS — M81.0 OSTEOPOROSIS, UNSPECIFIED OSTEOPOROSIS TYPE, UNSPECIFIED PATHOLOGICAL FRACTURE PRESENCE: ICD-10-CM

## 2023-10-06 DIAGNOSIS — M05.9 SEROPOSITIVE RHEUMATOID ARTHRITIS: ICD-10-CM

## 2023-10-06 DIAGNOSIS — G89.29 CHRONIC RIGHT SHOULDER PAIN: ICD-10-CM

## 2023-10-08 RX ORDER — ERGOCALCIFEROL 1.25 MG/1
50000 CAPSULE ORAL
Qty: 12 CAPSULE | Refills: 3 | Status: SHIPPED | OUTPATIENT
Start: 2023-10-08

## 2023-10-19 DIAGNOSIS — M94.9 DISORDER OF CARTILAGE, UNSPECIFIED: ICD-10-CM

## 2023-10-19 DIAGNOSIS — K21.00 GASTROESOPHAGEAL REFLUX DISEASE WITH ESOPHAGITIS WITHOUT HEMORRHAGE: ICD-10-CM

## 2023-10-19 DIAGNOSIS — M19.90 OSTEOARTHRITIS, UNSPECIFIED OSTEOARTHRITIS TYPE, UNSPECIFIED SITE: ICD-10-CM

## 2023-10-19 DIAGNOSIS — I10 PRIMARY HYPERTENSION: ICD-10-CM

## 2023-10-19 DIAGNOSIS — B37.9 CANDIDIASIS: ICD-10-CM

## 2023-10-19 DIAGNOSIS — M54.32 SCIATIC NERVE PAIN, LEFT: ICD-10-CM

## 2023-10-19 DIAGNOSIS — G89.29 CHRONIC RIGHT SHOULDER PAIN: ICD-10-CM

## 2023-10-19 DIAGNOSIS — M16.0 PRIMARY OSTEOARTHRITIS OF BOTH HIPS: ICD-10-CM

## 2023-10-19 DIAGNOSIS — M25.511 CHRONIC RIGHT SHOULDER PAIN: ICD-10-CM

## 2023-10-19 DIAGNOSIS — M05.9 SEROPOSITIVE RHEUMATOID ARTHRITIS: ICD-10-CM

## 2023-10-19 DIAGNOSIS — M81.0 OSTEOPOROSIS, UNSPECIFIED OSTEOPOROSIS TYPE, UNSPECIFIED PATHOLOGICAL FRACTURE PRESENCE: ICD-10-CM

## 2023-10-19 RX ORDER — AMLODIPINE BESYLATE 5 MG/1
5 TABLET ORAL
Qty: 90 TABLET | Refills: 3 | Status: SHIPPED | OUTPATIENT
Start: 2023-10-19

## 2023-10-19 RX ORDER — FLUCONAZOLE 150 MG/1
150 TABLET ORAL
Qty: 7 TABLET | Refills: 3 | OUTPATIENT
Start: 2023-10-19

## 2023-10-23 DIAGNOSIS — K21.00 GASTROESOPHAGEAL REFLUX DISEASE WITH ESOPHAGITIS WITHOUT HEMORRHAGE: ICD-10-CM

## 2023-10-23 DIAGNOSIS — M81.0 OSTEOPOROSIS, UNSPECIFIED OSTEOPOROSIS TYPE, UNSPECIFIED PATHOLOGICAL FRACTURE PRESENCE: ICD-10-CM

## 2023-10-23 DIAGNOSIS — M06.9 RHEUMATOID ARTHRITIS FLARE: Primary | ICD-10-CM

## 2023-10-23 DIAGNOSIS — M16.0 PRIMARY OSTEOARTHRITIS OF BOTH HIPS: ICD-10-CM

## 2023-10-23 DIAGNOSIS — M05.9 SEROPOSITIVE RHEUMATOID ARTHRITIS: ICD-10-CM

## 2023-10-23 NOTE — TELEPHONE ENCOUNTER
Pharmacy requesting refill on Hydroxychloroquine 200mg  Pt's LOV 02/24/2023  Pt's NOV 01/11/2024  Medication pending

## 2023-10-24 RX ORDER — HYDROXYCHLOROQUINE SULFATE 200 MG/1
200 TABLET, FILM COATED ORAL 2 TIMES DAILY
Qty: 180 TABLET | Refills: 0 | Status: SHIPPED | OUTPATIENT
Start: 2023-10-24 | End: 2023-12-28 | Stop reason: SDUPTHER

## 2023-12-11 ENCOUNTER — DOCUMENTATION ONLY (OUTPATIENT)
Dept: RHEUMATOLOGY | Facility: CLINIC | Age: 68
End: 2023-12-11
Payer: MEDICARE

## 2023-12-11 NOTE — PROGRESS NOTES
Claudy Jimenes MD Ophthalmology cleared patient to continue plaquenil progress notes sent to scan

## 2023-12-26 DIAGNOSIS — M05.9 SEROPOSITIVE RHEUMATOID ARTHRITIS: ICD-10-CM

## 2023-12-26 DIAGNOSIS — M06.9 RHEUMATOID ARTHRITIS FLARE: ICD-10-CM

## 2023-12-26 DIAGNOSIS — M17.0 PRIMARY OSTEOARTHRITIS OF BOTH KNEES: ICD-10-CM

## 2023-12-28 ENCOUNTER — OFFICE VISIT (OUTPATIENT)
Dept: RHEUMATOLOGY | Facility: CLINIC | Age: 68
End: 2023-12-28
Payer: MEDICARE

## 2023-12-28 VITALS
BODY MASS INDEX: 40.71 KG/M2 | HEIGHT: 63 IN | SYSTOLIC BLOOD PRESSURE: 166 MMHG | HEART RATE: 80 BPM | WEIGHT: 229.75 LBS | DIASTOLIC BLOOD PRESSURE: 94 MMHG

## 2023-12-28 DIAGNOSIS — M54.2 CERVICAL PAIN (NECK): ICD-10-CM

## 2023-12-28 DIAGNOSIS — R94.6 ABNORMAL RESULTS OF THYROID FUNCTION STUDIES: ICD-10-CM

## 2023-12-28 DIAGNOSIS — M81.0 OSTEOPOROSIS, UNSPECIFIED OSTEOPOROSIS TYPE, UNSPECIFIED PATHOLOGICAL FRACTURE PRESENCE: ICD-10-CM

## 2023-12-28 DIAGNOSIS — J45.909 ASTHMA, UNSPECIFIED ASTHMA SEVERITY, UNSPECIFIED WHETHER COMPLICATED, UNSPECIFIED WHETHER PERSISTENT: ICD-10-CM

## 2023-12-28 DIAGNOSIS — M05.9 SEROPOSITIVE RHEUMATOID ARTHRITIS: ICD-10-CM

## 2023-12-28 DIAGNOSIS — F17.200 SMOKER: ICD-10-CM

## 2023-12-28 DIAGNOSIS — M06.9 RHEUMATOID ARTHRITIS FLARE: ICD-10-CM

## 2023-12-28 DIAGNOSIS — M47.817 LUMBAR AND SACRAL OSTEOARTHRITIS: ICD-10-CM

## 2023-12-28 DIAGNOSIS — M17.0 PRIMARY OSTEOARTHRITIS OF BOTH KNEES: ICD-10-CM

## 2023-12-28 DIAGNOSIS — G62.9 NEUROPATHY: ICD-10-CM

## 2023-12-28 DIAGNOSIS — Z87.891 PERSONAL HISTORY OF NICOTINE DEPENDENCE: ICD-10-CM

## 2023-12-28 DIAGNOSIS — M05.9 SEROPOSITIVE RHEUMATOID ARTHRITIS: Primary | ICD-10-CM

## 2023-12-28 DIAGNOSIS — M19.90 OSTEOARTHRITIS, UNSPECIFIED OSTEOARTHRITIS TYPE, UNSPECIFIED SITE: ICD-10-CM

## 2023-12-28 DIAGNOSIS — E07.9 DISORDER OF THYROID, UNSPECIFIED: ICD-10-CM

## 2023-12-28 PROCEDURE — 96372 PR INJECTION,THERAP/PROPH/DIAG2ST, IM OR SUBCUT: ICD-10-PCS | Mod: S$GLB,,, | Performed by: INTERNAL MEDICINE

## 2023-12-28 PROCEDURE — 3077F PR MOST RECENT SYSTOLIC BLOOD PRESSURE >= 140 MM HG: ICD-10-PCS | Mod: CPTII,S$GLB,, | Performed by: INTERNAL MEDICINE

## 2023-12-28 PROCEDURE — 1101F PR PT FALLS ASSESS DOC 0-1 FALLS W/OUT INJ PAST YR: ICD-10-PCS | Mod: CPTII,S$GLB,, | Performed by: INTERNAL MEDICINE

## 2023-12-28 PROCEDURE — 3288F FALL RISK ASSESSMENT DOCD: CPT | Mod: CPTII,S$GLB,, | Performed by: INTERNAL MEDICINE

## 2023-12-28 PROCEDURE — 1125F PR PAIN SEVERITY QUANTIFIED, PAIN PRESENT: ICD-10-PCS | Mod: CPTII,S$GLB,, | Performed by: INTERNAL MEDICINE

## 2023-12-28 PROCEDURE — 3288F PR FALLS RISK ASSESSMENT DOCUMENTED: ICD-10-PCS | Mod: CPTII,S$GLB,, | Performed by: INTERNAL MEDICINE

## 2023-12-28 PROCEDURE — 99999 PR PBB SHADOW E&M-EST. PATIENT-LVL IV: ICD-10-PCS | Mod: PBBFAC,,, | Performed by: INTERNAL MEDICINE

## 2023-12-28 PROCEDURE — 1101F PT FALLS ASSESS-DOCD LE1/YR: CPT | Mod: CPTII,S$GLB,, | Performed by: INTERNAL MEDICINE

## 2023-12-28 PROCEDURE — 3077F SYST BP >= 140 MM HG: CPT | Mod: CPTII,S$GLB,, | Performed by: INTERNAL MEDICINE

## 2023-12-28 PROCEDURE — 96372 THER/PROPH/DIAG INJ SC/IM: CPT | Mod: S$GLB,,, | Performed by: INTERNAL MEDICINE

## 2023-12-28 PROCEDURE — 1159F MED LIST DOCD IN RCRD: CPT | Mod: CPTII,S$GLB,, | Performed by: INTERNAL MEDICINE

## 2023-12-28 PROCEDURE — 99215 PR OFFICE/OUTPT VISIT, EST, LEVL V, 40-54 MIN: ICD-10-PCS | Mod: 25,S$GLB,, | Performed by: INTERNAL MEDICINE

## 2023-12-28 PROCEDURE — 1159F PR MEDICATION LIST DOCUMENTED IN MEDICAL RECORD: ICD-10-PCS | Mod: CPTII,S$GLB,, | Performed by: INTERNAL MEDICINE

## 2023-12-28 PROCEDURE — 99215 OFFICE O/P EST HI 40 MIN: CPT | Mod: 25,S$GLB,, | Performed by: INTERNAL MEDICINE

## 2023-12-28 PROCEDURE — 3008F BODY MASS INDEX DOCD: CPT | Mod: CPTII,S$GLB,, | Performed by: INTERNAL MEDICINE

## 2023-12-28 PROCEDURE — 3080F DIAST BP >= 90 MM HG: CPT | Mod: CPTII,S$GLB,, | Performed by: INTERNAL MEDICINE

## 2023-12-28 PROCEDURE — 1125F AMNT PAIN NOTED PAIN PRSNT: CPT | Mod: CPTII,S$GLB,, | Performed by: INTERNAL MEDICINE

## 2023-12-28 PROCEDURE — 3008F PR BODY MASS INDEX (BMI) DOCUMENTED: ICD-10-PCS | Mod: CPTII,S$GLB,, | Performed by: INTERNAL MEDICINE

## 2023-12-28 PROCEDURE — 99999 PR PBB SHADOW E&M-EST. PATIENT-LVL IV: CPT | Mod: PBBFAC,,, | Performed by: INTERNAL MEDICINE

## 2023-12-28 PROCEDURE — 3080F PR MOST RECENT DIASTOLIC BLOOD PRESSURE >= 90 MM HG: ICD-10-PCS | Mod: CPTII,S$GLB,, | Performed by: INTERNAL MEDICINE

## 2023-12-28 RX ORDER — METHYLPREDNISOLONE ACETATE 80 MG/ML
160 INJECTION, SUSPENSION INTRA-ARTICULAR; INTRALESIONAL; INTRAMUSCULAR; SOFT TISSUE
Status: COMPLETED | OUTPATIENT
Start: 2023-12-28 | End: 2023-12-28

## 2023-12-28 RX ORDER — AZITHROMYCIN 250 MG/1
TABLET, FILM COATED ORAL
Qty: 6 TABLET | Refills: 0 | Status: SHIPPED | OUTPATIENT
Start: 2023-12-28 | End: 2024-01-02

## 2023-12-28 RX ORDER — GABAPENTIN 100 MG/1
100 CAPSULE ORAL 3 TIMES DAILY
Qty: 270 CAPSULE | Refills: 3 | Status: SHIPPED | OUTPATIENT
Start: 2023-12-28 | End: 2024-12-27

## 2023-12-28 RX ORDER — KETOROLAC TROMETHAMINE 30 MG/ML
60 INJECTION, SOLUTION INTRAMUSCULAR; INTRAVENOUS
Status: COMPLETED | OUTPATIENT
Start: 2023-12-28 | End: 2023-12-28

## 2023-12-28 RX ORDER — IBUPROFEN AND FAMOTIDINE 26.6; 8 MG/1; MG/1
1 TABLET ORAL 3 TIMES DAILY PRN
Qty: 90 TABLET | Refills: 5 | OUTPATIENT
Start: 2023-12-28

## 2023-12-28 RX ORDER — FLUTICASONE FUROATE AND VILANTEROL 200; 25 UG/1; UG/1
1 POWDER RESPIRATORY (INHALATION) DAILY
Qty: 60 EACH | Refills: 3 | Status: SHIPPED | OUTPATIENT
Start: 2023-12-28

## 2023-12-28 RX ORDER — CYANOCOBALAMIN 1000 UG/ML
1000 INJECTION, SOLUTION INTRAMUSCULAR; SUBCUTANEOUS
Status: COMPLETED | OUTPATIENT
Start: 2023-12-28 | End: 2023-12-28

## 2023-12-28 RX ORDER — METHYLPREDNISOLONE 4 MG/1
TABLET ORAL
Qty: 21 EACH | Refills: 0 | Status: SHIPPED | OUTPATIENT
Start: 2023-12-28 | End: 2024-01-18

## 2023-12-28 RX ORDER — HYDROXYCHLOROQUINE SULFATE 200 MG/1
200 TABLET, FILM COATED ORAL 2 TIMES DAILY
Qty: 180 TABLET | Refills: 3 | Status: SHIPPED | OUTPATIENT
Start: 2023-12-28

## 2023-12-28 RX ADMIN — KETOROLAC TROMETHAMINE 60 MG: 30 INJECTION, SOLUTION INTRAMUSCULAR; INTRAVENOUS at 05:12

## 2023-12-28 RX ADMIN — CYANOCOBALAMIN 1000 MCG: 1000 INJECTION, SOLUTION INTRAMUSCULAR; SUBCUTANEOUS at 04:12

## 2023-12-28 RX ADMIN — METHYLPREDNISOLONE ACETATE 160 MG: 80 INJECTION, SUSPENSION INTRA-ARTICULAR; INTRALESIONAL; INTRAMUSCULAR; SOFT TISSUE at 05:12

## 2023-12-28 ASSESSMENT — ROUTINE ASSESSMENT OF PATIENT INDEX DATA (RAPID3)
TOTAL RAPID3 SCORE: 7.72
PATIENT GLOBAL ASSESSMENT SCORE: 8.5
MDHAQ FUNCTION SCORE: 1.7
FATIGUE SCORE: 2.2
PAIN SCORE: 9
PSYCHOLOGICAL DISTRESS SCORE: 0

## 2023-12-28 NOTE — PROGRESS NOTES
Subjective:     Patient ID:  Gwadwoa Valencia    Chief Complaint:  Disease Management     History of Present Illness:  Follow up: 67 year old female who presents to clinic for follow up on seropositive rheumatoid arthritis. She is taking duexis and plaquenil twice daily and feels her arthritis is stable on this regimen. She had an episode sob and L arm numbness and  Sob chest xray showed interstitial marking, elevated wbc count saw Gabapentin helps some with L sided sciatic pain--only tried 1 tab nightly thus far. R shoulder is painful. Current tx:  1. Plaquenil  2. Duexis  Rheumatologic History:   - Diagnosis/es:  - Positive serologies:  - Negative serologies:  - Infectious screening labs:  - Imaging:  - Previous Treatments:  - Current Treatments:     Interval History:   Hospitalization since last office visit: No    Patient Active Problem List    Diagnosis Date Noted    Rheumatoid arthritis(714.0) 2014    Osteoarthritis of both hips 2014    Osteoporosis 2014     Past Surgical History:   Procedure Laterality Date    ADENOIDECTOMY      ANKLE LIGAMENT RECONSTRUCTION      CARPAL TUNNEL RELEASE Right     CHOLECYSTECTOMY      HYSTERECTOMY       Social History     Tobacco Use    Smoking status: Some Days     Current packs/day: 0.00     Types: Cigarettes     Last attempt to quit: 2015     Years since quittin.9    Smokeless tobacco: Never   Substance Use Topics    Alcohol use: No     Alcohol/week: 0.0 standard drinks of alcohol    Drug use: No     No family history on file.  Review of patient's allergies indicates:   Allergen Reactions    Morpholine analogues Itching    Aspirin     Penicillins     Percodan [oxycodone hcl-oxycodone-asa]     Reclast [zoledronic acid-mannitol-water]        Vaccines:  Recommend influenza annually, prevnar 20 if not completed yet, TdaP every 10 years, shingrix x 2, COVID, and RSV if 60 years old or older    Current tx plaquenil        Review of Systems   Review of  Systems   Constitutional:  Negative for activity change, appetite change, chills, diaphoresis, fatigue, fever and unexpected weight change.   HENT:  Negative for congestion, dental problem, ear discharge, ear pain, facial swelling, mouth sores, nosebleeds, postnasal drip, rhinorrhea, sinus pressure, sneezing, sore throat, tinnitus, trouble swallowing and voice change.    Eyes:  Negative for photophobia, pain, discharge, redness and itching.   Respiratory:  Negative for apnea, cough, chest tightness, shortness of breath and wheezing.    Cardiovascular:  Negative for chest pain, palpitations and leg swelling.   Gastrointestinal:  Negative for abdominal distention, abdominal pain, constipation, diarrhea, nausea and vomiting.   Endocrine: Negative for cold intolerance, heat intolerance, polydipsia and polyuria.   Genitourinary:  Negative for decreased urine volume, difficulty urinating, dysuria, flank pain, frequency, hematuria and urgency.   Musculoskeletal:  Negative for arthralgias, back pain, gait problem, joint swelling, myalgias, neck pain and neck stiffness.   Skin:  Negative for pallor, rash and wound.   Allergic/Immunologic: Negative for immunocompromised state.   Neurological:  Negative for dizziness, tremors, weakness, numbness and headaches.   Hematological:  Negative for adenopathy. Does not bruise/bleed easily.   Psychiatric/Behavioral:  Negative for sleep disturbance. The patient is not nervous/anxious.         Current Medications:  Current Outpatient Medications   Medication Instructions    amLODIPine (NORVASC) 5 mg, Oral    ATELVIA 35 mg, Oral, Every 7 days    azelastine (ASTELIN) 137 mcg (0.1 %) nasal spray No dose, route, or frequency recorded.    clotrimazole-betamethasone 1-0.05% (LOTRISONE) cream 2 times daily, Apply to affected area    dexlansoprazole (DEXILANT) 60 mg, Oral, Daily    DICLOFENAC SODIUM (PENNSAID TOP) Topical (Top)    ergocalciferol (ERGOCALCIFEROL) 50,000 Units, Oral, Every 7 days  "   famotidine (PEPCID) 20 mg, Oral, 2 times daily    fluconazole (DIFLUCAN) 150 mg, Oral, As needed (PRN)    folic acid (FOLVITE) 1 mg, Oral, Daily    gabapentin (NEURONTIN) 100 mg, Oral, 3 times daily, TAKE (1) CAPSULE BY MOUTH THREE TIMES DAILY.    hydroxychloroquine (PLAQUENIL) 200 mg, Oral, 2 times daily    ibuprofen (ADVIL,MOTRIN) 800 mg, Oral, 3 times daily PRN    ibuprofen-famotidine (DUEXIS) 800-26.6 mg Tab 1 tablet, Oral, 3 times daily PRN    LACTOBACILLUS ACIDOPHILUS (PROBIOTIC ORAL) Oral    meclizine (ANTIVERT) 25 mg, Oral, 3 times daily PRN    montelukast (SINGULAIR) 10 mg tablet No dose, route, or frequency recorded.    vitamin D (VITAMIN D3) 2,000 Units, Oral, Daily         Objective:     Vitals:    12/28/23 1524   BP: (!) 166/94   Pulse: 80   Weight: 104.2 kg (229 lb 11.5 oz)   Height: 5' 2.99" (1.6 m)   PainSc:   8      Body mass index is 40.7 kg/m².     Physical Examinations:  Physical Exam   Constitutional: She is oriented to person, place, and time. No distress.   HENT:   Head: Normocephalic and atraumatic.   Mouth/Throat: Oropharynx is clear and moist.   Eyes: Pupils are equal, round, and reactive to light.   Neck: No thyromegaly present.   Cardiovascular: Normal rate, regular rhythm and normal heart sounds. Exam reveals no gallop and no friction rub.   No murmur heard.  Pulmonary/Chest: Breath sounds normal. She has no wheezes. She has no rales. She exhibits no tenderness.   Abdominal: There is no abdominal tenderness. There is no rebound and no guarding.   Musculoskeletal:         General: Tenderness present.      Right shoulder: Tenderness present.      Left shoulder: Tenderness present.      Right elbow: Tenderness present.      Left elbow: Tenderness present.      Right wrist: Tenderness present.      Left wrist: Tenderness present.      Cervical back: Neck supple.      Right knee: No effusion. Tenderness present.      Left knee: No effusion. Tenderness present.   Lymphadenopathy:     She " has no cervical adenopathy.   Neurological: She is alert and oriented to person, place, and time.   Skin: No rash noted. No erythema. No pallor.   Psychiatric: Mood and affect normal.   Nursing note and vitals reviewed.      Right Side Rheumatological Exam     The patient is tender to palpation of the shoulder, elbow, wrist, knee, 1st PIP, 1st MCP, 2nd PIP, 2nd MCP, 3rd PIP, 3rd MCP, 4th PIP, 4th MCP, 5th PIP and 5th MCP    She has swelling of the 1st PIP, 1st MCP, 2nd PIP, 2nd MCP, 3rd PIP, 3rd MCP, 4th PIP, 4th MCP, 5th PIP and 5th MCP    Shoulder Exam   Tenderness Location: biceps tendon and clavicle    Range of Motion   Active abduction:  abnormal   Adduction: abnormal  Sensation: normal    Knee Exam   Patellofemoral Crepitus: positive  Effusion: negative  Sensation: normal    Hip Exam   Tenderness Location: posterior, greater trochanter and lateral  Sensation: normal    Elbow/Wrist Exam   Tenderness Location: lateral epicondyle and medial epicondyle  Sensation: normal    Foot Exam   Right foot exam exhibits signs of inflamed dorsum  Right foot exam exhibits signs of no podagra, no tophus and no plantar fasciitis    Muscle Strength (0-5 scale):  : 4/5     Left Side Rheumatological Exam     The patient is tender to palpation of the shoulder, elbow, wrist, knee, 1st PIP, 1st MCP, 2nd PIP, 2nd MCP, 3rd PIP, 3rd MCP, 4th PIP, 4th MCP, 5th PIP and 5th MCP.    She has swelling of the 1st PIP, 1st MCP, 2nd PIP, 2nd MCP, 3rd PIP, 3rd MCP, 4th PIP, 4th MCP, 5th PIP and 5th MCP    Shoulder Exam   Tenderness Location: biceps tendon and clavicle    Range of Motion   Active abduction:  abnormal   Sensation: normal    Knee Exam     Patellofemoral Crepitus: positive  Effusion: negative  Sensation: normal    Hip Exam   Tenderness Location: posterior, greater trochanter and lateral  Sensation: normal    Elbow/Wrist Exam   Tenderness Location: lateral epicondyle and medial epicondyle  Sensation: normal    Foot Exam   Left  foot exam exhibits signs of inflamed dorsum  Left foot exam exhibits signs of no podagra and no plantar fasciitis    Muscle Strength (0-5 scale):  :  4/5       Back/Neck Exam   General Inspection   Gait: normal       Tenderness Right paramedian tenderness of the Occ, Upper C-Spine, Lower L-Spine and SI Joint.Left paramedian tenderness of the Occ, Upper C-Spine, Lower L-Spine and SI Joint.      Comments:  15 out of 18 tender points         Disease Assessment Scores:  Patient's Global Assessment of arthritis (0-10): 7  Physician's Global Assessment of arthritis (0-10): 7  Number of Tender Joints (0-28): 12  Number of Swollen Joints (0-28): 12    There is currently no information documented on the homunculus. Go to the Rheumatology activity and complete the homunculus joint exam.          No data to display                Monitoring Lab Results:  Lab Results   Component Value Date    WBC 14.5 (H) 11/07/2022    RBC 4.62 11/07/2022    HGB 13.7 11/07/2022    HCT 42.9 11/07/2022    MCV 93 11/07/2022    MCH 29.7 11/07/2022    MCHC 31.9 11/07/2022    RDW 14.2 11/07/2022     11/07/2022        Lab Results   Component Value Date     11/07/2022    K 3.8 11/07/2022     11/07/2022    CO2 21 11/07/2022    GLU 79 11/07/2022    BUN 17 11/07/2022    CREATININE 0.66 11/07/2022    CALCIUM 9.5 11/07/2022    PROT 6.7 07/14/2020    ALBUMIN 4.0 11/07/2022    BILITOT 0.4 11/07/2022    ALKPHOS 124 (H) 07/14/2020    AST 12 11/07/2022    ALT 8 11/07/2022    EGFRNORACEVR 96 11/07/2022       Lab Results   Component Value Date    SEDRATE 51 (H) 11/07/2022    CRP 7 11/07/2022        Lab Results   Component Value Date    LSJMCBDR45YB 79.4 11/07/2022        Lab Results   Component Value Date    CHOL 203 (H) 07/09/2019    HDL 81 07/09/2019    LDLCALC 110 (H) 07/09/2019    TRIG 60 07/09/2019       Lab Results   Component Value Date    RF 18.4 (H) 11/07/2022     Lab Results   Component Value Date    DSDNA <1 07/14/2020     "SSAANTIBODY 0.2 04/17/2018    SSBANTIBODY <0.2 04/17/2018     No results found for: "HLABB27"    Infectious Disease Screening:  No results found for: "HEPBSAG", "HEPBCAB", "HEPBSAB", "HEPBSURFABQU", "HEPBIGM"  No results found for: "HEPCAB", "HEPCVAB", "HCVQUANTRES"  No results found for: "TBGOLDPLUS", "QUANTTBGDPL"  No results found for: "QUANTIFERON", "SVCMT", "QUANTAGVALUE", "QUANTNILVALU", "QUANTMITOGEN", "QFTTBAG", "QINT"     Imaging:  DEXA, Xrays, MRIs, CTs, etc        Current Medication Changes:  Medication List with Changes/Refills   Current Medications    AMLODIPINE (NORVASC) 5 MG TABLET    TAKE ONE TABLET BY MOUTH ONCE A DAY.    ATELVIA 35 MG TBEC    Take 1 tablet (35 mg total) by mouth every 7 days.    AZELASTINE (ASTELIN) 137 MCG (0.1 %) NASAL SPRAY        CLOTRIMAZOLE-BETAMETHASONE 1-0.05% (LOTRISONE) CREAM    APPLY TO AFFECTED AREA TWICE A DAY.    DEXLANSOPRAZOLE (DEXILANT) 60 MG CAPSULE    Take 1 capsule (60 mg total) by mouth once daily.    DICLOFENAC SODIUM (PENNSAID TOP)    Apply topically.    ERGOCALCIFEROL (ERGOCALCIFEROL) 50,000 UNIT CAP    TAKE 1 CAPSULE BY MOUTH ONCE A WEEK.    FAMOTIDINE (PEPCID) 20 MG TABLET    Take 1 tablet (20 mg total) by mouth 2 (two) times daily.    FLUCONAZOLE (DIFLUCAN) 150 MG TAB    Take 1 tablet (150 mg total) by mouth as needed.    FOLIC ACID (FOLVITE) 1 MG TABLET    Take 1 mg by mouth once daily.    GABAPENTIN (NEURONTIN) 100 MG CAPSULE    Take 1 capsule (100 mg total) by mouth 3 (three) times daily. TAKE (1) CAPSULE BY MOUTH THREE TIMES DAILY.    HYDROXYCHLOROQUINE (PLAQUENIL) 200 MG TABLET    Take 1 tablet (200 mg total) by mouth 2 (two) times daily.    IBUPROFEN (ADVIL,MOTRIN) 800 MG TABLET    Take 1 tablet (800 mg total) by mouth 3 (three) times daily as needed for Pain.    IBUPROFEN-FAMOTIDINE (DUEXIS) 800-26.6 MG TAB    Take 1 tablet by mouth 3 (three) times daily as needed (pain).    LACTOBACILLUS ACIDOPHILUS (PROBIOTIC ORAL)    Take by mouth.    MECLIZINE " (ANTIVERT) 25 MG TABLET    Take 1 tablet (25 mg total) by mouth 3 (three) times daily as needed for Dizziness.    MONTELUKAST (SINGULAIR) 10 MG TABLET        VITAMIN D 1000 UNITS TAB    Take 2,000 Units by mouth once daily.        Assessment:     Pt is a 68 y.o. female with rheumatoid arthritis. The patient has not achieved clinical remission. Plan is to continue hydroxychloroquine (PLAQUENIL) and consider starting upadacitinib (RINVOQ). If ra symptoms worsen   Plan:    Sobeida was seen today for disease management.    Diagnoses and all orders for this visit:    Seropositive rheumatoid arthritis  -     T4, Free; Future  -     TSH; Future  -     Vitamin D; Future  -     Cyclic Citrullinated Peptide Antibody, IgG; Future  -     Rheumatoid Factor; Future  -     C-Reactive Protein; Future  -     Comprehensive Metabolic Panel; Future  -     CBC Auto Differential; Future  -     T3, Free; Future  -     Sedimentation rate, automated; Future  -     T4, Free  -     TSH  -     Vitamin D  -     Cyclic Citrullinated Peptide Antibody, IgG  -     Rheumatoid Factor  -     C-Reactive Protein  -     Comprehensive Metabolic Panel  -     CBC Auto Differential  -     T3, Free  -     Sedimentation rate, automated  -     CT Chest Lung Screening Low Dose; Future  -     CT Chest Lung Screening Low Dose  -     X-Ray Chest PA And Lateral; Future  -     X-Ray Chest PA And Lateral  -     ketorolac injection 60 mg  -     methylPREDNISolone acetate injection 160 mg  -     cyanocobalamin injection 1,000 mcg  -     Hepatitis C Antibody; Future  -     Quantiferon Gold TB; Future  -     Hepatitis B Surface Antigen; Future  -     Hepatitis B Core Antibody, Total; Future  -     Hepatitis B Surface Ab, Qualitative; Future  -     Hepatitis C Antibody  -     Quantiferon Gold TB  -     Hepatitis B Surface Antigen  -     Hepatitis B Core Antibody, Total  -     Hepatitis B Surface Ab, Qualitative    Primary osteoarthritis of both knees  -     T4, Free;  Future  -     TSH; Future  -     Vitamin D; Future  -     Cyclic Citrullinated Peptide Antibody, IgG; Future  -     Rheumatoid Factor; Future  -     C-Reactive Protein; Future  -     Comprehensive Metabolic Panel; Future  -     CBC Auto Differential; Future  -     T3, Free; Future  -     Sedimentation rate, automated; Future  -     T4, Free  -     TSH  -     Vitamin D  -     Cyclic Citrullinated Peptide Antibody, IgG  -     Rheumatoid Factor  -     C-Reactive Protein  -     Comprehensive Metabolic Panel  -     CBC Auto Differential  -     T3, Free  -     Sedimentation rate, automated  -     X-Ray Chest PA And Lateral; Future  -     X-Ray Chest PA And Lateral  -     ketorolac injection 60 mg  -     methylPREDNISolone acetate injection 160 mg  -     cyanocobalamin injection 1,000 mcg  -     Hepatitis C Antibody; Future  -     Quantiferon Gold TB; Future  -     Hepatitis B Surface Antigen; Future  -     Hepatitis B Core Antibody, Total; Future  -     Hepatitis B Surface Ab, Qualitative; Future  -     Hepatitis C Antibody  -     Quantiferon Gold TB  -     Hepatitis B Surface Antigen  -     Hepatitis B Core Antibody, Total  -     Hepatitis B Surface Ab, Qualitative    Osteoarthritis, unspecified osteoarthritis type, unspecified site  -     T4, Free; Future  -     TSH; Future  -     Vitamin D; Future  -     Cyclic Citrullinated Peptide Antibody, IgG; Future  -     Rheumatoid Factor; Future  -     C-Reactive Protein; Future  -     Comprehensive Metabolic Panel; Future  -     CBC Auto Differential; Future  -     T3, Free; Future  -     Sedimentation rate, automated; Future  -     T4, Free  -     TSH  -     Vitamin D  -     Cyclic Citrullinated Peptide Antibody, IgG  -     Rheumatoid Factor  -     C-Reactive Protein  -     Comprehensive Metabolic Panel  -     CBC Auto Differential  -     T3, Free  -     Sedimentation rate, automated  -     X-Ray Chest PA And Lateral; Future  -     X-Ray Chest PA And Lateral  -     ketorolac  injection 60 mg  -     methylPREDNISolone acetate injection 160 mg  -     cyanocobalamin injection 1,000 mcg  -     Hepatitis C Antibody; Future  -     Quantiferon Gold TB; Future  -     Hepatitis B Surface Antigen; Future  -     Hepatitis B Core Antibody, Total; Future  -     Hepatitis B Surface Ab, Qualitative; Future  -     Hepatitis C Antibody  -     Quantiferon Gold TB  -     Hepatitis B Surface Antigen  -     Hepatitis B Core Antibody, Total  -     Hepatitis B Surface Ab, Qualitative    Osteoporosis, unspecified osteoporosis type, unspecified pathological fracture presence  -     T4, Free; Future  -     TSH; Future  -     Vitamin D; Future  -     Cyclic Citrullinated Peptide Antibody, IgG; Future  -     Rheumatoid Factor; Future  -     C-Reactive Protein; Future  -     Comprehensive Metabolic Panel; Future  -     CBC Auto Differential; Future  -     T3, Free; Future  -     Sedimentation rate, automated; Future  -     T4, Free  -     TSH  -     Vitamin D  -     Cyclic Citrullinated Peptide Antibody, IgG  -     Rheumatoid Factor  -     C-Reactive Protein  -     Comprehensive Metabolic Panel  -     CBC Auto Differential  -     T3, Free  -     Sedimentation rate, automated    Lumbar and sacral osteoarthritis  -     T4, Free; Future  -     TSH; Future  -     Vitamin D; Future  -     Cyclic Citrullinated Peptide Antibody, IgG; Future  -     Rheumatoid Factor; Future  -     C-Reactive Protein; Future  -     Comprehensive Metabolic Panel; Future  -     CBC Auto Differential; Future  -     T3, Free; Future  -     Sedimentation rate, automated; Future  -     T4, Free  -     TSH  -     Vitamin D  -     Cyclic Citrullinated Peptide Antibody, IgG  -     Rheumatoid Factor  -     C-Reactive Protein  -     Comprehensive Metabolic Panel  -     CBC Auto Differential  -     T3, Free  -     Sedimentation rate, automated  -     Hepatitis C Antibody; Future  -     Quantiferon Gold TB; Future  -     Hepatitis B Surface Antigen;  Future  -     Hepatitis B Core Antibody, Total; Future  -     Hepatitis B Surface Ab, Qualitative; Future  -     Hepatitis C Antibody  -     Quantiferon Gold TB  -     Hepatitis B Surface Antigen  -     Hepatitis B Core Antibody, Total  -     Hepatitis B Surface Ab, Qualitative    Abnormal results of thyroid function studies  -     T4, Free; Future  -     T4, Free  -     Hepatitis C Antibody; Future  -     Quantiferon Gold TB; Future  -     Hepatitis B Surface Antigen; Future  -     Hepatitis B Core Antibody, Total; Future  -     Hepatitis B Surface Ab, Qualitative; Future  -     Hepatitis C Antibody  -     Quantiferon Gold TB  -     Hepatitis B Surface Antigen  -     Hepatitis B Core Antibody, Total  -     Hepatitis B Surface Ab, Qualitative    Disorder of thyroid, unspecified  -     TSH; Future  -     TSH    Smoker  -     CT Chest Lung Screening Low Dose; Future  -     CT Chest Lung Screening Low Dose    Personal history of nicotine dependence  -     CT Chest Lung Screening Low Dose; Future  -     CT Chest Lung Screening Low Dose    Cervical pain (neck)  -     X-Ray Chest PA And Lateral; Future  -     X-Ray Chest PA And Lateral    Neuropathy  -     X-Ray Chest PA And Lateral; Future  -     X-Ray Chest PA And Lateral        1.continue plaquenil    2. H/O tobacco ct of the chest    3. Consider rinvoq or xeljanz    4.nurse injection     5. Labs [facility] at [facility]    More than 50% of the  40 minute encounter was spent face to face counseling the patient regarding current status and future plan of care as well as side effects  of the medications. All questions were answered to patient's satisfaction also includes  non-face to face time preparing to see the patient (eg, review of tests), Obtaining and/or reviewing separately obtained history, Documenting clinical information in the electronic or other health record, Independently interpreting results

## 2024-01-03 LAB
GAMMA INTERFERON BACKGROUND BLD IA-ACNC: 0.03 IU/ML
HBV CORE AB SERPL QL IA: NEGATIVE
HBV SURFACE AB SER QL: NON REACTIVE
HBV SURFACE AG SERPL QL IA: NEGATIVE
HCV IGG SERPL QL IA: NON REACTIVE
M TB IFN-G BLD-IMP: NEGATIVE
M TB IFN-G CD4+ T-CELLS BLD-ACNC: 0.04 IU/ML
M TBIFN-G CD4+ CD8+T-CELLS BLD-ACNC: 0.03 IU/ML
MITOGEN IGNF BLD-ACNC: >10 IU/ML
QUANTIFERON TB GOLD (INCUBATED): NORMAL
SERVICE CMNT-IMP: NORMAL

## 2024-01-04 LAB
25(OH)D3+25(OH)D2 SERPL-MCNC: 44.8 NG/ML (ref 30–100)
ALBUMIN SERPL-MCNC: 4.2 G/DL (ref 3.9–4.9)
ALBUMIN/GLOB SERPL: 1.4 {RATIO} (ref 1.2–2.2)
ALP SERPL-CCNC: 162 IU/L (ref 44–121)
ALT SERPL-CCNC: 12 IU/L (ref 0–32)
AST SERPL-CCNC: 22 IU/L (ref 0–40)
BASOPHILS # BLD AUTO: 0.1 X10E3/UL (ref 0–0.2)
BASOPHILS NFR BLD AUTO: 1 %
BILIRUB SERPL-MCNC: 0.2 MG/DL (ref 0–1.2)
BUN SERPL-MCNC: 14 MG/DL (ref 8–27)
BUN/CREAT SERPL: 19 (ref 12–28)
CALCIUM SERPL-MCNC: 9.6 MG/DL (ref 8.7–10.3)
CCP IGA+IGG SERPL IA-ACNC: 24 UNITS
CHLORIDE SERPL-SCNC: 104 MMOL/L (ref 96–106)
CO2 SERPL-SCNC: 23 MMOL/L (ref 20–29)
CREAT SERPL-MCNC: 0.74 MG/DL (ref 0.57–1)
CRP SERPL-MCNC: 8 MG/L (ref 0–10)
EOSINOPHIL # BLD AUTO: 1.4 X10E3/UL (ref 0–0.4)
EOSINOPHIL NFR BLD AUTO: 11 %
ERYTHROCYTE [DISTWIDTH] IN BLOOD BY AUTOMATED COUNT: 13.5 % (ref 11.7–15.4)
ERYTHROCYTE [SEDIMENTATION RATE] IN BLOOD BY WESTERGREN METHOD: 26 MM/HR (ref 0–40)
EST. GFR  (NO RACE VARIABLE): 88 ML/MIN/1.73
GLOBULIN SER CALC-MCNC: 3.1 G/DL (ref 1.5–4.5)
GLUCOSE SERPL-MCNC: 95 MG/DL (ref 70–99)
HCT VFR BLD AUTO: 41.9 % (ref 34–46.6)
HGB BLD-MCNC: 13.6 G/DL (ref 11.1–15.9)
IMM GRANULOCYTES NFR BLD AUTO: 1 %
LYMPHOCYTES # BLD AUTO: 2.5 X10E3/UL (ref 0.7–3.1)
LYMPHOCYTES NFR BLD AUTO: 20 %
MCH RBC QN AUTO: 30.5 PG (ref 26.6–33)
MCHC RBC AUTO-ENTMCNC: 32.5 G/DL (ref 31.5–35.7)
MCV RBC AUTO: 94 FL (ref 79–97)
MONOCYTES # BLD AUTO: 1.1 X10E3/UL (ref 0.1–0.9)
MONOCYTES NFR BLD AUTO: 9 %
NEUTROPHILS # BLD AUTO: 7.4 X10E3/UL (ref 1.4–7)
NEUTROPHILS NFR BLD AUTO: 58 %
PLATELET # BLD AUTO: 248 X10E3/UL (ref 150–450)
POTASSIUM SERPL-SCNC: 3.4 MMOL/L (ref 3.5–5.2)
PROT SERPL-MCNC: 7.3 G/DL (ref 6–8.5)
RBC # BLD AUTO: 4.46 X10E6/UL (ref 3.77–5.28)
RHEUMATOID FACT SERPL-ACNC: 20.2 IU/ML
SODIUM SERPL-SCNC: 143 MMOL/L (ref 134–144)
T3FREE SERPL-MCNC: 3.2 PG/ML (ref 2–4.4)
T4 FREE SERPL-MCNC: 1.19 NG/DL (ref 0.82–1.77)
TSH SERPL DL<=0.005 MIU/L-ACNC: 2.43 UIU/ML (ref 0.45–4.5)
WBC # BLD AUTO: 12.6 X10E3/UL (ref 3.4–10.8)

## 2024-01-08 RX ORDER — FLUCONAZOLE 150 MG/1
150 TABLET ORAL
Qty: 7 TABLET | Refills: 3 | Status: SHIPPED | OUTPATIENT
Start: 2024-01-08

## 2024-01-08 RX ORDER — IBUPROFEN AND FAMOTIDINE 26.6; 8 MG/1; MG/1
1 TABLET ORAL 3 TIMES DAILY
Qty: 90 TABLET | Refills: 5 | Status: SHIPPED | OUTPATIENT
Start: 2024-01-08

## 2024-01-29 RX ORDER — PANTOPRAZOLE SODIUM 40 MG/1
40 TABLET, DELAYED RELEASE ORAL
Qty: 90 TABLET | Refills: 0 | Status: SHIPPED | OUTPATIENT
Start: 2024-01-29 | End: 2024-04-10 | Stop reason: SDUPTHER

## 2024-04-10 DIAGNOSIS — B37.9 CANDIDIASIS: ICD-10-CM

## 2024-04-10 DIAGNOSIS — M16.0 PRIMARY OSTEOARTHRITIS OF BOTH HIPS: ICD-10-CM

## 2024-04-10 DIAGNOSIS — K21.00 GASTROESOPHAGEAL REFLUX DISEASE WITH ESOPHAGITIS WITHOUT HEMORRHAGE: ICD-10-CM

## 2024-04-10 DIAGNOSIS — I10 PRIMARY HYPERTENSION: ICD-10-CM

## 2024-04-10 DIAGNOSIS — M25.511 CHRONIC RIGHT SHOULDER PAIN: ICD-10-CM

## 2024-04-10 DIAGNOSIS — M19.90 OSTEOARTHRITIS, UNSPECIFIED OSTEOARTHRITIS TYPE, UNSPECIFIED SITE: ICD-10-CM

## 2024-04-10 DIAGNOSIS — M81.0 OSTEOPOROSIS, UNSPECIFIED OSTEOPOROSIS TYPE, UNSPECIFIED PATHOLOGICAL FRACTURE PRESENCE: ICD-10-CM

## 2024-04-10 DIAGNOSIS — M05.9 SEROPOSITIVE RHEUMATOID ARTHRITIS: ICD-10-CM

## 2024-04-10 DIAGNOSIS — G89.29 CHRONIC RIGHT SHOULDER PAIN: ICD-10-CM

## 2024-04-10 DIAGNOSIS — M94.9 DISORDER OF CARTILAGE, UNSPECIFIED: ICD-10-CM

## 2024-04-10 DIAGNOSIS — M54.32 SCIATIC NERVE PAIN, LEFT: ICD-10-CM

## 2024-04-14 RX ORDER — RISEDRONATE SODIUM 30.1; 4.9 MG/1; MG/1
1 TABLET, DELAYED RELEASE ORAL
Qty: 12 TABLET | Refills: 1 | Status: SHIPPED | OUTPATIENT
Start: 2024-04-14 | End: 2025-04-14

## 2024-04-14 RX ORDER — PANTOPRAZOLE SODIUM 40 MG/1
40 TABLET, DELAYED RELEASE ORAL DAILY
Qty: 90 TABLET | Refills: 1 | Status: SHIPPED | OUTPATIENT
Start: 2024-04-14 | End: 2024-06-14 | Stop reason: SDUPTHER

## 2024-06-14 ENCOUNTER — OFFICE VISIT (OUTPATIENT)
Dept: RHEUMATOLOGY | Facility: CLINIC | Age: 69
End: 2024-06-14
Payer: MEDICARE

## 2024-06-14 VITALS
DIASTOLIC BLOOD PRESSURE: 79 MMHG | WEIGHT: 224 LBS | SYSTOLIC BLOOD PRESSURE: 167 MMHG | HEIGHT: 63 IN | BODY MASS INDEX: 39.69 KG/M2 | HEART RATE: 75 BPM

## 2024-06-14 DIAGNOSIS — M16.0 PRIMARY OSTEOARTHRITIS OF BOTH HIPS: ICD-10-CM

## 2024-06-14 DIAGNOSIS — M17.0 PRIMARY OSTEOARTHRITIS OF BOTH KNEES: ICD-10-CM

## 2024-06-14 DIAGNOSIS — M81.0 OSTEOPOROSIS, UNSPECIFIED OSTEOPOROSIS TYPE, UNSPECIFIED PATHOLOGICAL FRACTURE PRESENCE: Primary | ICD-10-CM

## 2024-06-14 DIAGNOSIS — E55.9 VITAMIN D DEFICIENCY, UNSPECIFIED: ICD-10-CM

## 2024-06-14 DIAGNOSIS — F17.200 TOBACCO DEPENDENCE: ICD-10-CM

## 2024-06-14 DIAGNOSIS — K21.00 GASTROESOPHAGEAL REFLUX DISEASE WITH ESOPHAGITIS WITHOUT HEMORRHAGE: ICD-10-CM

## 2024-06-14 DIAGNOSIS — M81.6 LOCALIZED OSTEOPOROSIS (LEQUESNE): ICD-10-CM

## 2024-06-14 DIAGNOSIS — G89.29 CHRONIC RIGHT SHOULDER PAIN: ICD-10-CM

## 2024-06-14 DIAGNOSIS — M25.511 CHRONIC RIGHT SHOULDER PAIN: ICD-10-CM

## 2024-06-14 DIAGNOSIS — M94.9 DISORDER OF CARTILAGE, UNSPECIFIED: ICD-10-CM

## 2024-06-14 DIAGNOSIS — R94.6 ABNORMAL RESULTS OF THYROID FUNCTION STUDIES: ICD-10-CM

## 2024-06-14 DIAGNOSIS — B37.9 CANDIDIASIS: ICD-10-CM

## 2024-06-14 DIAGNOSIS — M19.90 OSTEOARTHRITIS, UNSPECIFIED OSTEOARTHRITIS TYPE, UNSPECIFIED SITE: ICD-10-CM

## 2024-06-14 DIAGNOSIS — M54.2 CERVICAL PAIN (NECK): ICD-10-CM

## 2024-06-14 DIAGNOSIS — I10 PRIMARY HYPERTENSION: ICD-10-CM

## 2024-06-14 DIAGNOSIS — M06.9 RHEUMATOID ARTHRITIS FLARE: ICD-10-CM

## 2024-06-14 DIAGNOSIS — Z87.891 PERSONAL HISTORY OF NICOTINE DEPENDENCE: ICD-10-CM

## 2024-06-14 DIAGNOSIS — M54.32 SCIATIC NERVE PAIN, LEFT: ICD-10-CM

## 2024-06-14 DIAGNOSIS — M05.9 SEROPOSITIVE RHEUMATOID ARTHRITIS: ICD-10-CM

## 2024-06-14 DIAGNOSIS — M47.817 LUMBAR AND SACRAL OSTEOARTHRITIS: ICD-10-CM

## 2024-06-14 PROCEDURE — 3078F DIAST BP <80 MM HG: CPT | Mod: CPTII,S$GLB,, | Performed by: INTERNAL MEDICINE

## 2024-06-14 PROCEDURE — 1125F AMNT PAIN NOTED PAIN PRSNT: CPT | Mod: CPTII,S$GLB,, | Performed by: INTERNAL MEDICINE

## 2024-06-14 PROCEDURE — 3077F SYST BP >= 140 MM HG: CPT | Mod: CPTII,S$GLB,, | Performed by: INTERNAL MEDICINE

## 2024-06-14 PROCEDURE — 1159F MED LIST DOCD IN RCRD: CPT | Mod: CPTII,S$GLB,, | Performed by: INTERNAL MEDICINE

## 2024-06-14 PROCEDURE — 1160F RVW MEDS BY RX/DR IN RCRD: CPT | Mod: CPTII,S$GLB,, | Performed by: INTERNAL MEDICINE

## 2024-06-14 PROCEDURE — 1101F PT FALLS ASSESS-DOCD LE1/YR: CPT | Mod: CPTII,S$GLB,, | Performed by: INTERNAL MEDICINE

## 2024-06-14 PROCEDURE — 3008F BODY MASS INDEX DOCD: CPT | Mod: CPTII,S$GLB,, | Performed by: INTERNAL MEDICINE

## 2024-06-14 PROCEDURE — 3288F FALL RISK ASSESSMENT DOCD: CPT | Mod: CPTII,S$GLB,, | Performed by: INTERNAL MEDICINE

## 2024-06-14 PROCEDURE — 99999 PR PBB SHADOW E&M-EST. PATIENT-LVL IV: CPT | Mod: PBBFAC,,, | Performed by: INTERNAL MEDICINE

## 2024-06-14 PROCEDURE — 96372 THER/PROPH/DIAG INJ SC/IM: CPT | Mod: S$GLB,,, | Performed by: INTERNAL MEDICINE

## 2024-06-14 PROCEDURE — 99215 OFFICE O/P EST HI 40 MIN: CPT | Mod: 25,S$GLB,, | Performed by: INTERNAL MEDICINE

## 2024-06-14 RX ORDER — ERGOCALCIFEROL 1.25 MG/1
50000 CAPSULE ORAL
Qty: 12 CAPSULE | Refills: 3 | Status: SHIPPED | OUTPATIENT
Start: 2024-06-14

## 2024-06-14 RX ORDER — FLUCONAZOLE 200 MG/1
200 TABLET ORAL DAILY
Qty: 30 TABLET | Refills: 1 | Status: SHIPPED | OUTPATIENT
Start: 2024-06-14 | End: 2024-07-14

## 2024-06-14 RX ORDER — HYDROXYCHLOROQUINE SULFATE 200 MG/1
200 TABLET, FILM COATED ORAL 2 TIMES DAILY
Qty: 180 TABLET | Refills: 3 | Status: SHIPPED | OUTPATIENT
Start: 2024-06-14

## 2024-06-14 RX ORDER — UPADACITINIB 15 MG/1
15 TABLET, EXTENDED RELEASE ORAL DAILY
Qty: 30 TABLET | Refills: 11 | Status: ACTIVE | OUTPATIENT
Start: 2024-06-14 | End: 2025-06-14

## 2024-06-14 RX ORDER — KETOROLAC TROMETHAMINE 30 MG/ML
60 INJECTION, SOLUTION INTRAMUSCULAR; INTRAVENOUS
Status: COMPLETED | OUTPATIENT
Start: 2024-06-14 | End: 2024-06-14

## 2024-06-14 RX ORDER — IBUPROFEN AND FAMOTIDINE 26.6; 8 MG/1; MG/1
1 TABLET ORAL 3 TIMES DAILY
Qty: 90 TABLET | Refills: 5 | Status: SHIPPED | OUTPATIENT
Start: 2024-06-14 | End: 2024-06-14 | Stop reason: SDUPTHER

## 2024-06-14 RX ORDER — GABAPENTIN 400 MG/1
400 CAPSULE ORAL 3 TIMES DAILY
Qty: 270 CAPSULE | Refills: 3 | Status: SHIPPED | OUTPATIENT
Start: 2024-06-14 | End: 2025-06-14

## 2024-06-14 RX ORDER — CYANOCOBALAMIN 1000 UG/ML
1000 INJECTION, SOLUTION INTRAMUSCULAR; SUBCUTANEOUS
Status: COMPLETED | OUTPATIENT
Start: 2024-06-14 | End: 2024-06-14

## 2024-06-14 RX ORDER — IBUPROFEN AND FAMOTIDINE 26.6; 8 MG/1; MG/1
1 TABLET ORAL 3 TIMES DAILY
Qty: 270 TABLET | Refills: 1 | Status: SHIPPED | OUTPATIENT
Start: 2024-06-14 | End: 2025-06-14

## 2024-06-14 RX ORDER — PANTOPRAZOLE SODIUM 40 MG/1
40 TABLET, DELAYED RELEASE ORAL DAILY
Qty: 90 TABLET | Refills: 3 | Status: SHIPPED | OUTPATIENT
Start: 2024-06-14

## 2024-06-14 RX ORDER — METHYLPREDNISOLONE ACETATE 80 MG/ML
160 INJECTION, SUSPENSION INTRA-ARTICULAR; INTRALESIONAL; INTRAMUSCULAR; SOFT TISSUE
Status: COMPLETED | OUTPATIENT
Start: 2024-06-14 | End: 2024-06-14

## 2024-06-14 RX ADMIN — KETOROLAC TROMETHAMINE 60 MG: 30 INJECTION, SOLUTION INTRAMUSCULAR; INTRAVENOUS at 05:06

## 2024-06-14 RX ADMIN — METHYLPREDNISOLONE ACETATE 160 MG: 80 INJECTION, SUSPENSION INTRA-ARTICULAR; INTRALESIONAL; INTRAMUSCULAR; SOFT TISSUE at 05:06

## 2024-06-14 RX ADMIN — CYANOCOBALAMIN 1000 MCG: 1000 INJECTION, SOLUTION INTRAMUSCULAR; SUBCUTANEOUS at 05:06

## 2024-06-14 ASSESSMENT — ROUTINE ASSESSMENT OF PATIENT INDEX DATA (RAPID3)
PAIN SCORE: 9
PSYCHOLOGICAL DISTRESS SCORE: 2.2
FATIGUE SCORE: 1.1
MDHAQ FUNCTION SCORE: 1.6
PATIENT GLOBAL ASSESSMENT SCORE: 9
TOTAL RAPID3 SCORE: 7.78

## 2024-06-14 NOTE — PROGRESS NOTES
Subjective:     Patient ID:  Gwadwoa Valencia    Chief Complaint:  Disease Management     History of Present Illness:  Pt is a 69 y.o. female  who presents to clinic for follow up on seropositive rheumatoid arthritis. She is taking duexis and plaquenil twice daily and feels her arthritis is stable on this regimen. She had an episode sob and L arm numbness and  Sob chest xray showed interstitial marking, elevated wbc count saw Gabapentin helps some with L sided sciatic pain--only tried 1 tab nightly thus far. R shoulder is painful. Current tx:  1. Plaquenil  2. Duexis      Rheumatologic History:   - Diagnosis/es:  - Positive serologies:  - Infectious screening labs:  - Previous Treatments:  - Current Treatments:     Interval History:   Hospitalization since last office visit: No    Patient Active Problem List    Diagnosis Date Noted    Rheumatoid arthritis(714.0) 2014    Osteoarthritis of both hips 2014    Osteoporosis 2014     Past Surgical History:   Procedure Laterality Date    ADENOIDECTOMY      ANKLE LIGAMENT RECONSTRUCTION      CARPAL TUNNEL RELEASE Right     CHOLECYSTECTOMY      HYSTERECTOMY       Social History     Tobacco Use    Smoking status: Some Days     Current packs/day: 0.00     Types: Cigarettes     Last attempt to quit: 2015     Years since quittin.3    Smokeless tobacco: Never   Substance Use Topics    Alcohol use: No     Alcohol/week: 0.0 standard drinks of alcohol    Drug use: No     No family history on file.  Review of patient's allergies indicates:   Allergen Reactions    Morpholine analogues Itching    Aspirin     Penicillins     Percodan [oxycodone hcl-oxycodone-asa]     Reclast [zoledronic acid-mannitol-water]        Review of Systems   Review of Systems   Constitutional:  Positive for activity change and fatigue. Negative for appetite change, chills, diaphoresis, fever and unexpected weight change.   HENT:  Negative for congestion, dental problem, ear discharge,  ear pain, facial swelling, mouth sores, nosebleeds, postnasal drip, rhinorrhea, sinus pressure, sneezing, sore throat, tinnitus, trouble swallowing and voice change.    Eyes:  Negative for photophobia, pain, discharge, redness and itching.   Respiratory:  Positive for cough. Negative for apnea, chest tightness, shortness of breath and wheezing.    Cardiovascular:  Positive for leg swelling. Negative for chest pain and palpitations.   Gastrointestinal:  Positive for abdominal pain. Negative for abdominal distention, constipation, diarrhea, nausea and vomiting.   Endocrine: Negative for cold intolerance, heat intolerance, polydipsia and polyuria.   Genitourinary:  Negative for decreased urine volume, difficulty urinating, dysuria, flank pain, frequency, hematuria and urgency.   Musculoskeletal:  Positive for arthralgias, back pain, gait problem, joint swelling, myalgias, neck pain and neck stiffness.   Skin:  Negative for pallor, rash and wound.   Allergic/Immunologic: Negative for immunocompromised state.   Neurological:  Negative for dizziness, tremors, numbness and headaches.   Hematological:  Negative for adenopathy. Does not bruise/bleed easily.   Psychiatric/Behavioral:  Negative for sleep disturbance. The patient is not nervous/anxious.         Current Medications:  Current Outpatient Medications   Medication Instructions    amLODIPine (NORVASC) 5 mg, Oral    ATELVIA 35 mg, Oral, Every 7 days    azelastine (ASTELIN) 137 mcg (0.1 %) nasal spray No dose, route, or frequency recorded.    clotrimazole-betamethasone 1-0.05% (LOTRISONE) cream 2 times daily, Apply to affected area    DICLOFENAC SODIUM (PENNSAID TOP) Topical (Top)    ergocalciferol (ERGOCALCIFEROL) 50,000 Units, Oral, Every 7 days    fluconazole (DIFLUCAN) 200 mg, Oral, Daily    fluticasone furoate-vilanteroL (BREO ELLIPTA) 200-25 mcg/dose DsDv diskus inhaler 1 puff, Inhalation, Daily, Controller    folic acid (FOLVITE) 1 mg, Oral, Daily    gabapentin  "(NEURONTIN) 400 mg, Oral, 3 times daily, TAKE (1) CAPSULE BY MOUTH THREE TIMES DAILY.    hydroxychloroquine (PLAQUENIL) 200 mg, Oral, 2 times daily    ibuprofen-famotidine (DUEXIS) 800-26.6 mg Tab 1 tablet, Oral, 3 times daily    LACTOBACILLUS ACIDOPHILUS (PROBIOTIC ORAL) Oral    meclizine (ANTIVERT) 25 mg, Oral, 3 times daily PRN    pantoprazole (PROTONIX) 40 mg, Oral, Daily    RINVOQ 15 mg, Oral, Daily    vitamin D (VITAMIN D3) 2,000 Units, Oral, Daily         Objective:     Vitals:    06/14/24 1405   BP: (!) 167/79   Pulse: 75   Weight: 101.6 kg (224 lb)   Height: 5' 2.99" (1.6 m)   PainSc:   8   PainLoc: Generalized      Body mass index is 39.69 kg/m².     Physical Examinations:  Physical Exam   Constitutional: She is oriented to person, place, and time.   HENT:   Head: Normocephalic and atraumatic.   Mouth/Throat: Oropharynx is clear and moist.   Eyes: Pupils are equal, round, and reactive to light.   Neck: No thyromegaly present.   Cardiovascular: Normal rate, regular rhythm and normal heart sounds. Exam reveals no gallop and no friction rub.   No murmur heard.  Pulmonary/Chest: Breath sounds normal. She has no wheezes. She has no rales. She exhibits no tenderness.   Abdominal: There is no abdominal tenderness. There is no rebound and no guarding.   Musculoskeletal:      Right shoulder: Tenderness present.      Left shoulder: Tenderness present.      Right elbow: Normal.      Left elbow: Normal.      Right wrist: Swelling and tenderness present.      Left wrist: Swelling and tenderness present.      Cervical back: Neck supple.      Right knee: No effusion. Tenderness present.      Left knee: No effusion. Tenderness present.      Left ankle: Swelling present.   Lymphadenopathy:     She has no cervical adenopathy.   Neurological: She is alert and oriented to person, place, and time. Gait normal.   Skin: No rash noted. No erythema. No pallor.   Psychiatric: Mood and affect normal.   Nursing note and vitals " reviewed.      Right Side Rheumatological Exam     Examination finds the elbow normal.    The patient is tender to palpation of the shoulder, wrist, knee, 1st PIP, 1st MCP, 2nd PIP, 2nd MCP, 3rd PIP, 3rd MCP, 4th PIP, 4th MCP, 5th PIP and 5th MCP    She has swelling of the wrist, 1st PIP, 1st MCP, 2nd PIP, 2nd MCP, 3rd PIP, 3rd MCP, 4th PIP, 4th MCP, 5th PIP and 5th MCP    The patient has an enlarged wrist    Shoulder Exam   Tenderness Location: no tenderness    Range of Motion   Active abduction:  abnormal   Adduction: abnormal  Sensation: normal    Knee Exam   Tenderness Location: lateral joint line  Patellofemoral Crepitus: positive  Effusion: negative  Sensation: normal    Hip Exam   Tenderness Location: posterior  Sensation: normal    Elbow/Wrist Exam   Tenderness Location: no tenderness  Sensation: normal    Muscle Strength (0-5 scale):  Neck Flexion:  2  Neck Extension: 2  : 2/5     Left Side Rheumatological Exam     Examination finds the elbow normal.    The patient is tender to palpation of the shoulder, wrist, knee, 1st PIP, 1st MCP, 2nd PIP, 2nd MCP, 3rd PIP, 3rd MCP, 4th PIP, 4th MCP, 5th PIP, 5th MCP and temporomandibular.    She has swelling of the wrist, 1st PIP, 1st MCP, 2nd PIP, 2nd MCP, 3rd PIP, 3rd MCP, 4th PIP, 4th MCP, 5th PIP, 5th MCP, 1st CMC, 2nd DIP, 3rd DIP, 4th DIP, 5th DIP, knee, 1st MTP, 2nd MTP, 3rd MTP, 4th MTP, 1st toe IP, 2nd toe IP, 3rd toe IP, 4th toe IP and 5th toe IP    The patient has an enlarged wrist, 1st CMC, 2nd DIP, 3rd DIP, 4th DIP, 5th DIP, 1st toe IP, 2nd toe IP, 3rd toe IP, 4th toe IP and 5th toe IP.    Shoulder Exam   Tenderness Location: acromioclavicular joint    Range of Motion   Active abduction:  abnormal   Sensation: normal    Knee Exam     Patellofemoral Crepitus: positive  Effusion: negative  Sensation: normal    Hip Exam   Tenderness Location: posterior  Sensation: normal    Elbow/Wrist Exam   Sensation: normal    Muscle Strength (0-5 scale):  Neck  "Flexion:  2  Neck Extension: 2  :  1/5       Back/Neck Exam   General Inspection   Gait: normal            Disease Assessment Scores:  Patient's Global Assessment of arthritis (0-10): 1  Physician's Global Assessment of arthritis (0-10): 2  Number of Tender Joints (0-28): 2  Number of Swollen Joints (0-28): 3         No data to display                Monitoring Lab Results:  Lab Results   Component Value Date    WBC 12.6 (H) 12/29/2023    RBC 4.46 12/29/2023    HGB 13.6 12/29/2023    HCT 41.9 12/29/2023    MCV 94 12/29/2023    MCH 30.5 12/29/2023    MCHC 32.5 12/29/2023    RDW 13.5 12/29/2023     12/29/2023        Lab Results   Component Value Date     12/29/2023    K 3.4 (L) 12/29/2023     12/29/2023    CO2 23 12/29/2023    GLU 95 12/29/2023    BUN 14 12/29/2023    CREATININE 0.74 12/29/2023    CALCIUM 9.6 12/29/2023    PROT 6.7 07/14/2020    ALBUMIN 4.2 12/29/2023    BILITOT 0.2 12/29/2023    ALKPHOS 124 (H) 07/14/2020    AST 22 12/29/2023    ALT 12 12/29/2023    EGFRNORACEVR 88 12/29/2023       Lab Results   Component Value Date    SEDRATE 26 12/29/2023    CRP 8 12/29/2023        Lab Results   Component Value Date    PFDMNDQY17GW 44.8 12/29/2023        Lab Results   Component Value Date    CHOL 203 (H) 07/09/2019    HDL 81 07/09/2019    LDLCALC 110 (H) 07/09/2019    TRIG 60 07/09/2019       Lab Results   Component Value Date    RF 20.2 (H) 12/29/2023     Lab Results   Component Value Date    DSDNA <1 07/14/2020    SSAANTIBODY 0.2 04/17/2018    SSBANTIBODY <0.2 04/17/2018     No results found for: "HLABB27"    Infectious Disease Screening:  Lab Results   Component Value Date    HEPBSAG Negative 12/29/2023    HEPBCAB Negative 12/29/2023    HEPBSAB Non Reactive 12/29/2023     Lab Results   Component Value Date    HEPCVAB Non Reactive 12/29/2023     Lab Results   Component Value Date    QUANTTBGDPL Negative 12/29/2023     Lab Results   Component Value Date    QUANTNILVALU 0.03 12/29/2023    " "QUANTMITOGEN >10.00 12/29/2023        Imaging: DEXA, Xrays, MRIs, CTs, etc    Old & Outside Medical Records:  Reviewed old and all outside medical records available in Care Everywhere   Assessment:     Encounter Diagnoses   Name Primary?    Osteoporosis, unspecified osteoporosis type, unspecified pathological fracture presence Yes    Seropositive rheumatoid arthritis     Tobacco dependence     Personal history of nicotine dependence     Cervical pain (neck)     Rheumatoid arthritis flare     Primary osteoarthritis of both knees     Osteoarthritis, unspecified osteoarthritis type, unspecified site     Candidiasis     Sciatic nerve pain, left     Chronic right shoulder pain     Disorder of cartilage, unspecified     Primary hypertension     Primary osteoarthritis of both hips     Gastroesophageal reflux disease with esophagitis without hemorrhage     Lumbar and sacral osteoarthritis     Abnormal results of thyroid function studies     Localized osteoporosis ([place])     Vitamin D deficiency, unspecified        Plan:      Encounter Diagnoses   Name Primary?    Osteoporosis, unspecified osteoporosis type, unspecified pathological fracture presence Yes    Seropositive rheumatoid arthritis     Tobacco dependence     Personal history of nicotine dependence     Cervical pain (neck)     Rheumatoid arthritis flare     Primary osteoarthritis of both knees     Osteoarthritis, unspecified osteoarthritis type, unspecified site     Candidiasis     Sciatic nerve pain, left     Chronic right shoulder pain     Disorder of cartilage, unspecified     Primary hypertension     Primary osteoarthritis of both hips     Gastroesophageal reflux disease with esophagitis without hemorrhage     Lumbar and sacral osteoarthritis      Sobeida Batres" was seen today for disease management.    Diagnoses and all orders for this visit:    Osteoporosis, unspecified osteoporosis type, unspecified pathological fracture presence  -     DXA Bone Density " Axial Skeleton 1 or more sites; Future  -     DXA Bone Density Axial Skeleton 1 or more sites  -     CT Chest Lung Screening Low Dose; Future  -     CT Chest Lung Screening Low Dose  -     X-Ray Cervical Spine AP And Lateral; Future  -     X-Ray Cervical Spine AP And Lateral  -     ergocalciferol (ERGOCALCIFEROL) 50,000 unit Cap; Take 1 capsule (50,000 Units total) by mouth every 7 days.    Seropositive rheumatoid arthritis  -     CT Chest Lung Screening Low Dose; Future  -     CT Chest Lung Screening Low Dose  -     X-Ray Chest PA And Lateral; Future  -     X-Ray Chest PA And Lateral  -     X-Ray Cervical Spine AP And Lateral; Future  -     X-Ray Cervical Spine AP And Lateral  -     Discontinue: ibuprofen-famotidine (DUEXIS) 800-26.6 mg Tab; Take 1 tablet by mouth 3 (three) times daily.  -     hydroxychloroquine (PLAQUENIL) 200 mg tablet; Take 1 tablet (200 mg total) by mouth 2 (two) times daily.  -     ketorolac injection 60 mg  -     methylPREDNISolone acetate injection 160 mg  -     cyanocobalamin injection 1,000 mcg  -     ergocalciferol (ERGOCALCIFEROL) 50,000 unit Cap; Take 1 capsule (50,000 Units total) by mouth every 7 days.  -     ibuprofen-famotidine (DUEXIS) 800-26.6 mg Tab; Take 1 tablet by mouth 3 (three) times daily.  -     T4, Free; Future  -     TSH; Future  -     Vitamin D; Future  -     Cyclic Citrullinated Peptide Antibody, IgG; Future  -     Rheumatoid Factor; Future  -     Sedimentation rate; Future  -     C-Reactive Protein; Future  -     Comprehensive Metabolic Panel; Future  -     CBC Auto Differential; Future  -     T4, Free  -     TSH  -     Vitamin D  -     Cyclic Citrullinated Peptide Antibody, IgG  -     Rheumatoid Factor  -     Sedimentation rate  -     C-Reactive Protein  -     Comprehensive Metabolic Panel  -     CBC Auto Differential    Tobacco dependence  -     CT Chest Lung Screening Low Dose; Future  -     CT Chest Lung Screening Low Dose    Personal history of nicotine  dependence  -     CT Chest Lung Screening Low Dose; Future  -     CT Chest Lung Screening Low Dose    Cervical pain (neck)  -     X-Ray Cervical Spine AP And Lateral; Future  -     X-Ray Cervical Spine AP And Lateral    Rheumatoid arthritis flare  -     Discontinue: ibuprofen-famotidine (DUEXIS) 800-26.6 mg Tab; Take 1 tablet by mouth 3 (three) times daily.  -     ketorolac injection 60 mg  -     methylPREDNISolone acetate injection 160 mg  -     cyanocobalamin injection 1,000 mcg  -     upadacitinib (RINVOQ) 15 mg 24 hr tablet; Take 1 tablet (15 mg total) by mouth once daily.  -     ibuprofen-famotidine (DUEXIS) 800-26.6 mg Tab; Take 1 tablet by mouth 3 (three) times daily.    Primary osteoarthritis of both knees  -     Discontinue: ibuprofen-famotidine (DUEXIS) 800-26.6 mg Tab; Take 1 tablet by mouth 3 (three) times daily.  -     X-Ray Knee AP Standing Bilateral; Future  -     X-Ray Knee AP Standing Bilateral  -     ibuprofen-famotidine (DUEXIS) 800-26.6 mg Tab; Take 1 tablet by mouth 3 (three) times daily.    Osteoarthritis, unspecified osteoarthritis type, unspecified site  -     ergocalciferol (ERGOCALCIFEROL) 50,000 unit Cap; Take 1 capsule (50,000 Units total) by mouth every 7 days.    Candidiasis  -     ergocalciferol (ERGOCALCIFEROL) 50,000 unit Cap; Take 1 capsule (50,000 Units total) by mouth every 7 days.  -     fluconazole (DIFLUCAN) 200 MG Tab; Take 1 tablet (200 mg total) by mouth once daily.    Sciatic nerve pain, left  -     ergocalciferol (ERGOCALCIFEROL) 50,000 unit Cap; Take 1 capsule (50,000 Units total) by mouth every 7 days.    Chronic right shoulder pain  -     ergocalciferol (ERGOCALCIFEROL) 50,000 unit Cap; Take 1 capsule (50,000 Units total) by mouth every 7 days.    Disorder of cartilage, unspecified  -     ergocalciferol (ERGOCALCIFEROL) 50,000 unit Cap; Take 1 capsule (50,000 Units total) by mouth every 7 days.    Primary hypertension  -     ergocalciferol (ERGOCALCIFEROL) 50,000 unit  Cap; Take 1 capsule (50,000 Units total) by mouth every 7 days.    Primary osteoarthritis of both hips  -     ergocalciferol (ERGOCALCIFEROL) 50,000 unit Cap; Take 1 capsule (50,000 Units total) by mouth every 7 days.  -     T4, Free; Future  -     TSH; Future  -     Vitamin D; Future  -     Cyclic Citrullinated Peptide Antibody, IgG; Future  -     Rheumatoid Factor; Future  -     Sedimentation rate; Future  -     C-Reactive Protein; Future  -     Comprehensive Metabolic Panel; Future  -     CBC Auto Differential; Future  -     T4, Free  -     TSH  -     Vitamin D  -     Cyclic Citrullinated Peptide Antibody, IgG  -     Rheumatoid Factor  -     Sedimentation rate  -     C-Reactive Protein  -     Comprehensive Metabolic Panel  -     CBC Auto Differential    Gastroesophageal reflux disease with esophagitis without hemorrhage  -     ergocalciferol (ERGOCALCIFEROL) 50,000 unit Cap; Take 1 capsule (50,000 Units total) by mouth every 7 days.  -     pantoprazole (PROTONIX) 40 MG tablet; Take 1 tablet (40 mg total) by mouth once daily.    Lumbar and sacral osteoarthritis  -     gabapentin (NEURONTIN) 400 MG capsule; Take 1 capsule (400 mg total) by mouth 3 (three) times daily. TAKE (1) CAPSULE BY MOUTH THREE TIMES DAILY.    Abnormal results of thyroid function studies  -     T4, Free; Future  -     T4, Free    Localized osteoporosis ([place])  -     TSH; Future  -     TSH    Vitamin D deficiency, unspecified  -     Vitamin D; Future  -     Vitamin D        1. Labs ordered  2. Ct chest ordered  3. rinvoq     Follow-up 6 months    I reviewed with the patient the [place]. Preventative Services Task Force Recommendation on Lung Cancer Screening With Low-Dose Computed Tomography.   Patient meets eligibility criteria as per current CMS guidelines for initial LDCT lung cancer screening (age 50-77; asymptomatic; tobacco smoking hx of at least 20  pack years; current smoker or one who has quit smoking within the last IS years).    Benefits and harms of screening discussed with patient, including follow-up diagnostic testing, over-diagnosis, false positive rate, and total radiation exposure.   Patient counseled on the importance of adherence to annual lung cancer LDCT screening, impact of comorbidities, and ability or willingness to undergo diagnosis   and treatment.   Patient counseled on the importance of maintaining cigarette smoking abstinence if former smoker or the importance of smoking cessation if current smoker and, if appropriate, furnishing of information about tobacco cessation interventions.   All questions were answered.  Patient wishes to proceed with initial/baseline testing.   Order has been placed.    More than 50% of the  40 minute encounter was spent face to face counseling the patient regarding current status and future plan of care as well as side effects  of the medications. All questions were answered to patient's satisfaction also includes  non-face to face time preparing to see the patient (eg, review of tests), Obtaining and/or reviewing separately obtained history, Documenting clinical information in the electronic or other health record, Independently interpreting results

## 2024-06-17 DIAGNOSIS — M25.511 CHRONIC RIGHT SHOULDER PAIN: ICD-10-CM

## 2024-06-17 DIAGNOSIS — K21.00 GASTROESOPHAGEAL REFLUX DISEASE WITH ESOPHAGITIS WITHOUT HEMORRHAGE: ICD-10-CM

## 2024-06-17 DIAGNOSIS — M05.9 SEROPOSITIVE RHEUMATOID ARTHRITIS: ICD-10-CM

## 2024-06-17 DIAGNOSIS — R21 RASH: ICD-10-CM

## 2024-06-17 DIAGNOSIS — G89.29 CHRONIC RIGHT SHOULDER PAIN: ICD-10-CM

## 2024-06-17 DIAGNOSIS — M81.0 OSTEOPOROSIS, UNSPECIFIED OSTEOPOROSIS TYPE, UNSPECIFIED PATHOLOGICAL FRACTURE PRESENCE: ICD-10-CM

## 2024-06-17 DIAGNOSIS — M19.90 OSTEOARTHRITIS, UNSPECIFIED OSTEOARTHRITIS TYPE, UNSPECIFIED SITE: ICD-10-CM

## 2024-06-17 DIAGNOSIS — B37.9 CANDIDIASIS: ICD-10-CM

## 2024-06-17 DIAGNOSIS — M54.32 SCIATIC NERVE PAIN, LEFT: ICD-10-CM

## 2024-06-17 DIAGNOSIS — M16.0 PRIMARY OSTEOARTHRITIS OF BOTH HIPS: ICD-10-CM

## 2024-06-17 DIAGNOSIS — M94.9 DISORDER OF CARTILAGE, UNSPECIFIED: ICD-10-CM

## 2024-06-17 DIAGNOSIS — I10 PRIMARY HYPERTENSION: ICD-10-CM

## 2024-06-22 RX ORDER — CLOTRIMAZOLE AND BETAMETHASONE DIPROPIONATE 10; .64 MG/G; MG/G
CREAM TOPICAL 2 TIMES DAILY
Qty: 45 G | Refills: 3 | Status: SHIPPED | OUTPATIENT
Start: 2024-06-22

## 2024-09-06 ENCOUNTER — TELEPHONE (OUTPATIENT)
Dept: RHEUMATOLOGY | Facility: CLINIC | Age: 69
End: 2024-09-06
Payer: MEDICARE

## 2024-09-06 DIAGNOSIS — G89.29 CHRONIC RIGHT SHOULDER PAIN: ICD-10-CM

## 2024-09-06 DIAGNOSIS — M05.9 SEROPOSITIVE RHEUMATOID ARTHRITIS: ICD-10-CM

## 2024-09-06 DIAGNOSIS — K21.00 GASTROESOPHAGEAL REFLUX DISEASE WITH ESOPHAGITIS WITHOUT HEMORRHAGE: ICD-10-CM

## 2024-09-06 DIAGNOSIS — M94.9 DISORDER OF CARTILAGE, UNSPECIFIED: ICD-10-CM

## 2024-09-06 DIAGNOSIS — M16.0 PRIMARY OSTEOARTHRITIS OF BOTH HIPS: ICD-10-CM

## 2024-09-06 DIAGNOSIS — M19.90 OSTEOARTHRITIS, UNSPECIFIED OSTEOARTHRITIS TYPE, UNSPECIFIED SITE: ICD-10-CM

## 2024-09-06 DIAGNOSIS — M81.0 OSTEOPOROSIS, UNSPECIFIED OSTEOPOROSIS TYPE, UNSPECIFIED PATHOLOGICAL FRACTURE PRESENCE: ICD-10-CM

## 2024-09-06 DIAGNOSIS — B37.9 CANDIDIASIS: ICD-10-CM

## 2024-09-06 DIAGNOSIS — M54.32 SCIATIC NERVE PAIN, LEFT: ICD-10-CM

## 2024-09-06 DIAGNOSIS — I10 PRIMARY HYPERTENSION: ICD-10-CM

## 2024-09-06 DIAGNOSIS — M25.511 CHRONIC RIGHT SHOULDER PAIN: ICD-10-CM

## 2024-09-06 RX ORDER — AMLODIPINE BESYLATE 5 MG/1
5 TABLET ORAL DAILY
Qty: 90 TABLET | Refills: 3 | Status: SHIPPED | OUTPATIENT
Start: 2024-09-06

## 2024-09-06 RX ORDER — RISEDRONATE SODIUM 30.1; 4.9 MG/1; MG/1
1 TABLET, DELAYED RELEASE ORAL
Qty: 12 TABLET | Refills: 1 | Status: SHIPPED | OUTPATIENT
Start: 2024-09-06 | End: 2025-09-06

## 2024-09-06 NOTE — TELEPHONE ENCOUNTER
Called patient stated that she is unable to afford the rinvoq. Cost $70. Notified her that may be FA that can help lower the cost and [facility] would be able to help look into options. Patient will call [facility] to discuss further. If no FA available, will need to look into an alternative option. Notified [facility] PharmD    Needs refills on amlodipine and risedronate. Sent to preferred pharmacy. Interested in discussing potential other osteoporosis medication options

## 2024-09-06 NOTE — TELEPHONE ENCOUNTER
----- Message from Silviano Dove PharmD sent at 8/30/2024  2:38 PM CDT -----  Regarding: [place] request a call  Patient asked if she could discuss treatment options prior to starting Rinvoq. Can you reach out to her to answer her questions? She did not want to d/w us here.

## 2024-09-12 LAB
25(OH)D3+25(OH)D2 SERPL-MCNC: 63.4 NG/ML (ref 30–100)
ALBUMIN SERPL-MCNC: 4 G/DL (ref 3.9–4.9)
ALP SERPL-CCNC: 131 IU/L (ref 44–121)
ALT SERPL-CCNC: 14 IU/L (ref 0–32)
AST SERPL-CCNC: 18 IU/L (ref 0–40)
BASOPHILS # BLD AUTO: 0.1 X10E3/UL (ref 0–0.2)
BASOPHILS NFR BLD AUTO: 1 %
BILIRUB SERPL-MCNC: 0.4 MG/DL (ref 0–1.2)
BUN SERPL-MCNC: 13 MG/DL (ref 8–27)
BUN/CREAT SERPL: 21 (ref 12–28)
CALCIUM SERPL-MCNC: 9.6 MG/DL (ref 8.7–10.3)
CCP IGA+IGG SERPL IA-ACNC: <20 UNITS
CHLORIDE SERPL-SCNC: 105 MMOL/L (ref 96–106)
CO2 SERPL-SCNC: 24 MMOL/L (ref 20–29)
CREAT SERPL-MCNC: 0.63 MG/DL (ref 0.57–1)
CRP SERPL-MCNC: 11 MG/L (ref 0–10)
EOSINOPHIL # BLD AUTO: 0.4 X10E3/UL (ref 0–0.4)
EOSINOPHIL NFR BLD AUTO: 3 %
ERYTHROCYTE [DISTWIDTH] IN BLOOD BY AUTOMATED COUNT: 13.8 % (ref 11.7–15.4)
ERYTHROCYTE [SEDIMENTATION RATE] IN BLOOD BY WESTERGREN METHOD: 40 MM/HR (ref 0–40)
EST. GFR  (NO RACE VARIABLE): 96 ML/MIN/1.73
GLOBULIN SER CALC-MCNC: 2.9 G/DL (ref 1.5–4.5)
GLUCOSE SERPL-MCNC: 84 MG/DL (ref 70–99)
HCT VFR BLD AUTO: 41.3 % (ref 34–46.6)
HGB BLD-MCNC: 13.3 G/DL (ref 11.1–15.9)
IMM GRANULOCYTES # BLD AUTO: 0.1 X10E3/UL (ref 0–0.1)
IMM GRANULOCYTES NFR BLD AUTO: 1 %
LYMPHOCYTES # BLD AUTO: 2.9 X10E3/UL (ref 0.7–3.1)
LYMPHOCYTES NFR BLD AUTO: 22 %
MCH RBC QN AUTO: 29 PG (ref 26.6–33)
MCHC RBC AUTO-ENTMCNC: 32.2 G/DL (ref 31.5–35.7)
MCV RBC AUTO: 90 FL (ref 79–97)
MONOCYTES # BLD AUTO: 1 X10E3/UL (ref 0.1–0.9)
MONOCYTES NFR BLD AUTO: 7 %
NEUTROPHILS # BLD AUTO: 8.9 X10E3/UL (ref 1.4–7)
NEUTROPHILS NFR BLD AUTO: 66 %
PLATELET # BLD AUTO: 262 X10E3/UL (ref 150–450)
POTASSIUM SERPL-SCNC: 3.7 MMOL/L (ref 3.5–5.2)
PROT SERPL-MCNC: 6.9 G/DL (ref 6–8.5)
RBC # BLD AUTO: 4.58 X10E6/UL (ref 3.77–5.28)
RHEUMATOID FACT SERPL-ACNC: 13.8 IU/ML
SODIUM SERPL-SCNC: 143 MMOL/L (ref 134–144)
T4 FREE SERPL-MCNC: 1.35 NG/DL (ref 0.82–1.77)
T4 SERPL-MCNC: 8.8 UG/DL (ref 4.5–12)
TSH SERPL DL<=0.005 MIU/L-ACNC: 0.7 UIU/ML (ref 0.45–4.5)
WBC # BLD AUTO: 13.3 X10E3/UL (ref 3.4–10.8)

## 2024-09-24 ENCOUNTER — TELEPHONE (OUTPATIENT)
Dept: RHEUMATOLOGY | Facility: CLINIC | Age: 69
End: 2024-09-24
Payer: MEDICARE

## 2024-09-24 NOTE — TELEPHONE ENCOUNTER
----- Message from Cyndi Conti sent at 9/24/2024 12:50 PM CDT -----  Contact: Patient  Type: Needs Medical Advice    Who Called:  Patient  What is this regarding?:  She wants to cancel her apt and reschedule her Dr Pulido.  Best Call Back Number:  [phone]  Additional Information:  Please call the patient back at the phone number listed above to advise. Thank you!

## 2024-09-26 ENCOUNTER — OFFICE VISIT (OUTPATIENT)
Dept: RHEUMATOLOGY | Facility: CLINIC | Age: 69
End: 2024-09-26
Payer: MEDICARE

## 2024-09-26 DIAGNOSIS — M19.90 OSTEOARTHRITIS, UNSPECIFIED OSTEOARTHRITIS TYPE, UNSPECIFIED SITE: ICD-10-CM

## 2024-09-26 DIAGNOSIS — M05.9 SEROPOSITIVE RHEUMATOID ARTHRITIS: Primary | ICD-10-CM

## 2024-09-26 DIAGNOSIS — M81.0 OSTEOPOROSIS, UNSPECIFIED OSTEOPOROSIS TYPE, UNSPECIFIED PATHOLOGICAL FRACTURE PRESENCE: ICD-10-CM

## 2024-09-26 DIAGNOSIS — Z79.1 ENCOUNTER FOR LONG-TERM (CURRENT) USE OF NSAIDS: ICD-10-CM

## 2024-09-26 PROCEDURE — 99215 OFFICE O/P EST HI 40 MIN: CPT | Mod: 95,,, | Performed by: PHYSICIAN ASSISTANT

## 2024-09-26 PROCEDURE — 1159F MED LIST DOCD IN RCRD: CPT | Mod: CPTII,95,, | Performed by: PHYSICIAN ASSISTANT

## 2024-09-26 PROCEDURE — 1160F RVW MEDS BY RX/DR IN RCRD: CPT | Mod: CPTII,95,, | Performed by: PHYSICIAN ASSISTANT

## 2024-09-26 RX ORDER — UPADACITINIB 15 MG/1
15 TABLET, EXTENDED RELEASE ORAL DAILY
Qty: 30 TABLET | Refills: 11 | Status: SHIPPED | OUTPATIENT
Start: 2024-09-26 | End: 2025-09-26

## 2024-09-26 NOTE — Clinical Note
PMR appt early December with Dr. Albarran Wants to reschedule her virtual in January to a Thursday ONLY Needs April visit in person with Dr. Pulido Schedule CT chest in December when she is in town Request DEXA scan from [facility] location

## 2024-09-26 NOTE — PROGRESS NOTES
The patient location is: [place]  The chief complaint leading to consultation is: RA    Visit type: audiovisual    Face to Face time with patient: 35 minutes  45 minutes of total time spent on the encounter, which includes face to face time and non-face to face time preparing to see the patient (eg, review of tests), Obtaining and/or reviewing separately obtained history, Documenting clinical information in the electronic or other health record, Independently interpreting results (not separately reported) and communicating results to the patient/family/caregiver, or Care coordination (not separately reported).   Each patient to whom he or she provides medical services by telemedicine is:  (1) informed of the relationship between the physician and patient and the respective role of any other health care provider with respect to management of the patient; and (2) notified that he or she may decline to receive medical services by telemedicine and may withdraw from such care at any time.    Notes:     Subjective:       Patient ID: Gwendolene DANIKA Valencia is a 69 y.o. female.    Chief Complaint: Disease Management    Mrs. Valencia is a 69 year old female who presents to telemedicine virtual for follow up on seropositive rheumatoid arthritis. Rinvoq prescribed at her last visit and she has not started treatment yet due to high copay cost $70. She is waiting to see if she there is a troy available to make it more affordable. She does not want to consider alternative treatments at this time.    She complains of increased pain in her L knee. She reports x-rays/MRI completed by Orthopedic local to her showed degenerative arthritis. She had steroid joint injection without relief. She is waiting for approval of viscous injection with insurance. She is having difficulty weight bearing on the L leg due to knee pain. She reports new pain in her low back since her knee is causing pain. She had MRI Lspine which she was told did not show  significant spinal abnormalities. She is already taking gabapentin 400 mg qhs and can not tolerate higher doses due to swelling. Not sure how much gabapentin helps.    She is taking duexis and plaquenil consistently, which help with her pain.     She had DEXA scan 3 weeks ago. This report is not available to me today.    We reviewed her recent labs: cbc showed leukocytosis which is chronic, elevated CRP, and normal RF/CCP which is new.    Current tx:  1. Plaquenil  2. Duexis      Review of Systems   Constitutional:  Positive for activity change. Negative for appetite change, chills, fatigue and fever.   Eyes:  Negative for visual disturbance.   Respiratory:  Negative for cough and shortness of breath.    Cardiovascular:  Negative for chest pain, palpitations and leg swelling.   Gastrointestinal:  Negative for abdominal pain, constipation, diarrhea, nausea and vomiting.   Musculoskeletal:  Positive for arthralgias and joint swelling.   Neurological:  Negative for dizziness, weakness, light-headedness and headaches.         Objective:     There were no vitals filed for this visit.      Past Medical History:   Diagnosis Date    Acid reflux     GERD (gastroesophageal reflux disease)     Hypertension     Osteoporosis     Rheumatoid arthritis(714.0)     Rheumatoid arthritis     Past Surgical History:   Procedure Laterality Date    ADENOIDECTOMY      ANKLE LIGAMENT RECONSTRUCTION      CARPAL TUNNEL RELEASE Right     CHOLECYSTECTOMY      HYSTERECTOMY            Physical Exam   Constitutional: She is oriented to person, place, and time.   Neurological: She is oriented to person, place, and time.    Labs:    Component      Latest Ref Rng 9/3/2024   WBC      3.4 - 10.8 x10E3/uL 13.3 (H)    RBC      3.77 - 5.28 x10E6/uL 4.58    Hemoglobin      11.1 - 15.9 g/dL 13.3    Hematocrit      34.0 - 46.6 % 41.3    MCV      79 - 97 fL 90    MCH      26.6 - 33.0 pg 29.0    MCHC      31.5 - 35.7 g/dL 32.2    RDW      11.7 - 15.4 % 13.8     Platelet Count      150 - 450 x10E3/uL 262    Neutrophils      Not Estab. % 66    Lymph %      Not Estab. % 22    Mono %      Not Estab. % 7    Eos %      Not Estab. % 3    Basophil %      Not Estab. % 1    Neutrophils, Abs      1.4 - 7.0 x10E3/uL 8.9 (H)    Lymph #      0.7 - 3.1 x10E3/uL 2.9    Mono #      0.1 - 0.9 x10E3/uL 1.0 (H)    Eos #      0.0 - 0.4 x10E3/uL 0.4    Baso #      0.0 - 0.2 x10E3/uL 0.1    Immature Granulocytes      Not Estab. % 1    Immature Grans (Abs)      0.0 - 0.1 x10E3/uL 0.1    Glucose      70 - 99 mg/dL 84    BUN      8 - 27 mg/dL 13    Creatinine      0.57 - 1.00 mg/dL 0.63    eGFR      >59 mL/min/1.73 96    BUN/CREAT RATIO      12 - 28  21    Sodium      134 - 144 mmol/L 143    Potassium      3.5 - 5.2 mmol/L 3.7    Chloride      96 - 106 mmol/L 105    CO2      20 - 29 mmol/L 24    Calcium      8.7 - 10.3 mg/dL 9.6    PROTEIN TOTAL      6.0 - 8.5 g/dL 6.9    Albumin      3.9 - 4.9 g/dL 4.0    Globulin, Total      1.5 - 4.5 g/dL 2.9    BILIRUBIN TOTAL      0.0 - 1.2 mg/dL 0.4    ALP      44 - 121 IU/L 131 (H)    AST      0 - 40 IU/L 18    ALT      0 - 32 IU/L 14    T4, Free      0.82 - 1.77 ng/dL 1.35    T4 Total      4.5 - 12.0 ug/dL 8.8    TSH      0.450 - 4.500 uIU/mL 0.698    Rheumatoid Factor      <14.0 IU/mL 13.8    Vitamin D      30.0 - 100.0 ng/mL 63.4    ANTI-CCP AB, IGG + IGA (RDL)      <20 Units <20    Sed Rate      0 - 40 mm/hr 40    CRP      0 - 10 mg/L 11 (H)      Assessment:       1. Seropositive rheumatoid arthritis    2. Osteoarthritis, unspecified osteoarthritis type, unspecified site    3. Osteoporosis, unspecified osteoporosis type, unspecified pathological fracture presence    4. Encounter for long-term (current) use of NSAIDs            Plan:       Seropositive rheumatoid arthritis  -     upadacitinib (RINVOQ) 15 mg 24 hr tablet; Take 1 tablet (15 mg total) by mouth once daily.  Dispense: 30 tablet; Refill: 11    Osteoarthritis, unspecified osteoarthritis type,  unspecified site  -     Ambulatory referral/consult to Physical Medicine Rehab; Future; Expected date: 10/03/2024    Osteoporosis, unspecified osteoporosis type, unspecified pathological fracture presence    Encounter for long-term (current) use of NSAIDs          Seropositive (+RF) rheumatoid arthritis on plaquenil  --vitamin D insufficiency  --HTN    1. Rinvoq resent to [facility] to see if financial assistance is available. Pt does not wish to consider any alternative RA treatments at this time.   2. Cont plaquenil 200 mg bid  3. Cont duexis tid PRN  4. PMR referral for knee evaluation  5. CT chest to f/u abnormal CXR  6. Obtain copy of DEXA scan from [facility]      Follow up:  4 mo Dr. Pulido

## 2024-12-03 ENCOUNTER — HOSPITAL ENCOUNTER (OUTPATIENT)
Dept: RADIOLOGY | Facility: HOSPITAL | Age: 69
Discharge: HOME | End: 2024-12-03
Attending: INTERNAL MEDICINE
Payer: MEDICARE

## 2024-12-03 ENCOUNTER — OFFICE VISIT (OUTPATIENT)
Dept: PHYSICAL MEDICINE AND REHAB | Facility: CLINIC | Age: 69
End: 2024-12-03
Payer: MEDICARE

## 2024-12-03 ENCOUNTER — HOSPITAL ENCOUNTER (OUTPATIENT)
Dept: RADIOLOGY | Facility: HOSPITAL | Age: 69
Discharge: HOME | End: 2024-12-03
Attending: PHYSICAL MEDICINE & REHABILITATION
Payer: MEDICARE

## 2024-12-03 VITALS — SYSTOLIC BLOOD PRESSURE: 160 MMHG | DIASTOLIC BLOOD PRESSURE: 82 MMHG | HEART RATE: 64 BPM

## 2024-12-03 DIAGNOSIS — M25.561 CHRONIC PAIN OF BOTH KNEES: Primary | ICD-10-CM

## 2024-12-03 DIAGNOSIS — M25.561 CHRONIC PAIN OF BOTH KNEES: ICD-10-CM

## 2024-12-03 DIAGNOSIS — M25.562 CHRONIC PAIN OF BOTH KNEES: ICD-10-CM

## 2024-12-03 DIAGNOSIS — M25.562 CHRONIC PAIN OF BOTH KNEES: Primary | ICD-10-CM

## 2024-12-03 DIAGNOSIS — G89.29 CHRONIC PAIN OF BOTH KNEES: Primary | ICD-10-CM

## 2024-12-03 DIAGNOSIS — G89.29 CHRONIC PAIN OF BOTH KNEES: ICD-10-CM

## 2024-12-03 DIAGNOSIS — M19.90 OSTEOARTHRITIS, UNSPECIFIED OSTEOARTHRITIS TYPE, UNSPECIFIED SITE: ICD-10-CM

## 2024-12-03 PROCEDURE — 1160F RVW MEDS BY RX/DR IN RCRD: CPT | Mod: CPTII,S$GLB,, | Performed by: PHYSICAL MEDICINE & REHABILITATION

## 2024-12-03 PROCEDURE — 71271 CT THORAX LUNG CANCER SCR C-: CPT | Mod: TC,PO

## 2024-12-03 PROCEDURE — 73562 X-RAY EXAM OF KNEE 3: CPT | Mod: TC,50,PO

## 2024-12-03 PROCEDURE — 99204 OFFICE O/P NEW MOD 45 MIN: CPT | Mod: S$GLB,,, | Performed by: PHYSICAL MEDICINE & REHABILITATION

## 2024-12-03 PROCEDURE — 3079F DIAST BP 80-89 MM HG: CPT | Mod: CPTII,S$GLB,, | Performed by: PHYSICAL MEDICINE & REHABILITATION

## 2024-12-03 PROCEDURE — 1159F MED LIST DOCD IN RCRD: CPT | Mod: CPTII,S$GLB,, | Performed by: PHYSICAL MEDICINE & REHABILITATION

## 2024-12-03 PROCEDURE — 1101F PT FALLS ASSESS-DOCD LE1/YR: CPT | Mod: CPTII,S$GLB,, | Performed by: PHYSICAL MEDICINE & REHABILITATION

## 2024-12-03 PROCEDURE — 1125F AMNT PAIN NOTED PAIN PRSNT: CPT | Mod: CPTII,S$GLB,, | Performed by: PHYSICAL MEDICINE & REHABILITATION

## 2024-12-03 PROCEDURE — 3077F SYST BP >= 140 MM HG: CPT | Mod: CPTII,S$GLB,, | Performed by: PHYSICAL MEDICINE & REHABILITATION

## 2024-12-03 PROCEDURE — 99999 PR PBB SHADOW E&M-EST. PATIENT-LVL III: CPT | Mod: PBBFAC,,, | Performed by: PHYSICAL MEDICINE & REHABILITATION

## 2024-12-03 PROCEDURE — 3288F FALL RISK ASSESSMENT DOCD: CPT | Mod: CPTII,S$GLB,, | Performed by: PHYSICAL MEDICINE & REHABILITATION

## 2024-12-03 PROCEDURE — 73562 X-RAY EXAM OF KNEE 3: CPT | Mod: 26,50,, | Performed by: RADIOLOGY

## 2024-12-03 NOTE — PROGRESS NOTES
[facility]    Consulting Provider: Merry Rai    CHIEF COMPLAINT:   Chief Complaint   Patient presents with    Knee Pain     B/l       HISTORY OF PRESENT ILLNESS: Sobeida Valencia is a 69 y.o. female who presents to me for evaluation and management of bilateral knee pain.     Today reports, chronic bilateral knee pain without noted traumatic event. Left knee worse than right. Pain to the medial joint line. Some noted swelling. No popping/locking of knees. Has been seen by outside Ortho provider for which she has undergone MRI imaging and management with steroid injections which did not provide any change in her pain complaints.     Of note, patient is concerned with long distance driving to our clinic and wanting to find a provider closer to her home.     Review of Systems   Constitutional: Negative for fever.   HENT: Negative for drooling.    Eyes: Negative for discharge.   Respiratory: Negative for choking.    Cardiovascular: Negative for chest pain.   Genitourinary: Negative for flank pain.   Skin: Negative for wound.   Allergic/Immunologic: Negative for immunocompromised state.   Neurological: Negative for tremors and syncope.   Psychiatric/Behavioral: Negative for behavioral problems.     Past Medical History:   Past Medical History:   Diagnosis Date    Acid reflux     GERD (gastroesophageal reflux disease)     Hypertension     Osteoporosis     Rheumatoid arthritis(714.0)     Rheumatoid arthritis       Past Surgical History:   Past Surgical History:   Procedure Laterality Date    ADENOIDECTOMY      ANKLE LIGAMENT RECONSTRUCTION      CARPAL TUNNEL RELEASE Right     CHOLECYSTECTOMY      HYSTERECTOMY         Family History: No family history on file.    Medications:   Current Outpatient Medications on File Prior to Visit   Medication Sig Dispense Refill    amLODIPine (NORVASC) 5 MG tablet Take 1 tablet (5 mg total) by mouth once daily. 90 tablet 3    ATELVIA 35 mg  TbEC Take 1 tablet (35 mg total) by mouth every 7 days. 12 tablet 1    azelastine (ASTELIN) 137 mcg (0.1 %) nasal spray       clotrimazole-betamethasone 1-0.05% (LOTRISONE) cream APPLY TO AFFECTED AREA TWICE A DAY. 45 g 3    DICLOFENAC SODIUM (PENNSAID TOP) Apply topically.      ergocalciferol (ERGOCALCIFEROL) 50,000 unit Cap Take 1 capsule (50,000 Units total) by mouth every 7 days. 12 capsule 3    fluticasone furoate-vilanteroL (BREO ELLIPTA) 200-25 mcg/dose DsDv diskus inhaler Inhale 1 puff into the lungs once daily. Controller 60 each 3    folic acid (FOLVITE) 1 MG tablet Take 1 mg by mouth once daily.      gabapentin (NEURONTIN) 400 MG capsule Take 1 capsule (400 mg total) by mouth 3 (three) times daily. TAKE (1) CAPSULE BY MOUTH THREE TIMES DAILY. 270 capsule 3    hydroxychloroquine (PLAQUENIL) 200 mg tablet Take 1 tablet (200 mg total) by mouth 2 (two) times daily. 180 tablet 3    ibuprofen-famotidine (DUEXIS) 800-26.6 mg Tab Take 1 tablet by mouth 3 (three) times daily. 270 tablet 1    LACTOBACILLUS ACIDOPHILUS (PROBIOTIC ORAL) Take by mouth.      meclizine (ANTIVERT) 25 mg tablet Take 1 tablet (25 mg total) by mouth 3 (three) times daily as needed for Dizziness. 90 tablet 0    pantoprazole (PROTONIX) 40 MG tablet Take 1 tablet (40 mg total) by mouth once daily. 90 tablet 3    vitamin D 1000 units Tab Take 2,000 Units by mouth once daily.       No current facility-administered medications on file prior to visit.       Allergies:   Review of patient's allergies indicates:   Allergen Reactions    Morpholine analogues Itching    Aspirin     Penicillins     Percodan [oxycodone hcl-oxycodone-asa]     Reclast [zoledronic acid-mannitol-water]        Social History:   Social History     Socioeconomic History    Marital status:    Tobacco Use    Smoking status: Some Days     Current packs/day: 0.00     Types: Cigarettes     Last attempt to quit: 2015     Years since quittin.8    Smokeless tobacco:  Never   Substance and Sexual Activity    Alcohol use: No     Alcohol/week: 0.0 standard drinks of alcohol    Drug use: No    Sexual activity: Never       PHYSICAL EXAMINATION:   Vitals:    12/03/24 1418   BP: (!) 160/82   BP Location: Left arm   Patient Position: Sitting   Pulse: 64     Constitutional: No apparent distress. Pleasant.  HENT: Trachea midline.  Head: Normocephalic and atraumatic.   Eyes: Right eye exhibits no discharge. Left eye exhibits no discharge. No scleral icterus.   CV: Well perfused.   Pulmonary/Chest: Effort normal. No respiratory distress.   Abdominal: There is no guarding.   Neurological: Awake, alert and cooperative.  SKIN: Intact no apparent lesions, cut, ulcers or abrasions  EXT:  No cyanosis, clubbing, or edema.  SENSORY: Intact to light touch in the bilateral lower extemities.  KNEE EXAMINATION:  MUSCULOSKELETAL:   Muscle Strength:(0-5)                             Right     Left  Hip Flexors                5  5  Hip Abductor              5  5  Hip Adductor              5  5  Knee Extensors          5  5  Knee Flexors              5  5  Ankle Dorsiflex           5  5  Ankle Planterflex        5  5  EHL                            5  5    Reflexes: (0-4+/4)   Right     Left  Patellar(L4)  2+  2+  Ankle(S1)  2+  2+       INSPECTION:                                                                           Right                Left        Localized/Generalized swelling:                     -                       -  Muscle contours normal and symmetrical:     +                      +  Patellas symetrical and level:                         +                      +  Ecchymoses:                                                   -                       -  Erythema:                                                        -                       -  Gross deformity:                                             -                       -     KNEE DEFORMITY:            Right                Left  Normal:                        +                      +  Genu varus:                -                       -  Genu valgum:             -                       -  Genu Recurvatum:     -                       -     RANGE OF MOTION:           Right                Left  Flexion:                       Full                  Full        Extension:                   Full                  Full  Other:      TENDERNESS:                        Right                Left  Medial Tibial Plateau:             -                       -  Lateral Tibial Plateau:             -                       -  Medial Femoral Condyle:        -                       -  Lateral Femoral Condyle:       -                       -  Head of the Fibula:                 -                       -  Medial joint line:                      +                       ++  Lateral joint line:                      -                       -  Patella:                                    -                       +  Medial collateral lig:                -                       -  Lateral collateral lig:                -                       -  Pes Anserine:                         -                       -     SOFT TISSUE PALPATION:                                       Right                Left  Baker's cyst:                -                       -             Effusion:                      -                       -  Ballottement:               -                       -  Other:                          -                       -     IMAGING:  XR bilateral knee 12/03/2024 with noted left > right tricompartmental osteoarthritis and joint space narrowing, kellgren tom grade 3.     Data Reviewed: X-ray  Supportive Actions: Independent visualization of images or test specimens.    ASSESSMENT:   1. Chronic pain of both knees    2. Osteoarthritis, unspecified osteoarthritis type, unspecified site        PLAN:   1. Time was spent reviewing the above diagnosis in depth with  "Sobeida today, including acute management and rehabilitation.     2. We discussed options for management of her pain would be to consider injection of either short acting steroid, long acting steroid (Zilretta), or hyaluronic acid. The use, benefits, risks, and expectations of all of these types of injections was discussed at length with her today. At this time, she elects to proceed with ultrasound-guided bilateral intra-articular knee long-acting steroid (Zilretta) injection(s). We will submit for insurance approval and have patient return to clinic for completion of procedure.     Patient with less than ideal response historically from short-acting steroids, as such we will submit for long-acting (zilretta) steroid for management.     3. Patient would prefer to have her injections closer to home, I reach out to Israel Peñaloza, provider at [facility] to take over her care.      Follow up with Dr. Peñaloza at [facility].     This is a consult from Merry Rai. Please see the "Communications" section of Epic to see how the consulting physician received the report of today's findings and recommendations. If it's an [facility] provider, it will be forwarded to his/her "in basket".    48 minutes of total time spent on the encounter, which includes face to face time and non-face to face time preparing to see the patient (eg, review of tests), obtaining and/or reviewing separately obtained history, documenting clinical information in the electronic or other health record, independently interpreting results (not separately reported), communicating results to the patient/family/caregiver, and/or care coordination (not separately reported).   "

## 2024-12-04 ENCOUNTER — TELEPHONE (OUTPATIENT)
Dept: ORTHOPEDICS | Facility: CLINIC | Age: 69
End: 2024-12-04
Payer: MEDICARE

## 2024-12-04 NOTE — TELEPHONE ENCOUNTER
Attempted to contact patient to schedule an appointment.    No answer, lvm for patient to call back to schedule.

## 2024-12-04 NOTE — TELEPHONE ENCOUNTER
----- Message from Israel Peñaloza MD sent at 12/4/2024  9:03 AM CST -----  Patient seen by [facility] in [place] - lives in [place].  Give her a call and get her set up for a clinic visit for her B knee pain.

## 2024-12-17 ENCOUNTER — OFFICE VISIT (OUTPATIENT)
Dept: ORTHOPEDICS | Facility: CLINIC | Age: 69
End: 2024-12-17
Payer: MEDICARE

## 2024-12-17 VITALS
BODY MASS INDEX: 38.46 KG/M2 | WEIGHT: 209 LBS | HEART RATE: 57 BPM | HEIGHT: 62 IN | SYSTOLIC BLOOD PRESSURE: 136 MMHG | DIASTOLIC BLOOD PRESSURE: 73 MMHG

## 2024-12-17 DIAGNOSIS — M17.12 PRIMARY OSTEOARTHRITIS OF LEFT KNEE: Primary | ICD-10-CM

## 2024-12-17 DIAGNOSIS — M17.11 PRIMARY OSTEOARTHRITIS OF RIGHT KNEE: ICD-10-CM

## 2024-12-17 PROCEDURE — 20610 DRAIN/INJ JOINT/BURSA W/O US: CPT | Mod: 50,,, | Performed by: ORTHOPAEDIC SURGERY

## 2024-12-17 PROCEDURE — 3078F DIAST BP <80 MM HG: CPT | Mod: CPTII,,, | Performed by: ORTHOPAEDIC SURGERY

## 2024-12-17 PROCEDURE — 1125F AMNT PAIN NOTED PAIN PRSNT: CPT | Mod: CPTII,,, | Performed by: ORTHOPAEDIC SURGERY

## 2024-12-17 PROCEDURE — 99204 OFFICE O/P NEW MOD 45 MIN: CPT | Mod: 25,,, | Performed by: ORTHOPAEDIC SURGERY

## 2024-12-17 PROCEDURE — 3288F FALL RISK ASSESSMENT DOCD: CPT | Mod: CPTII,,, | Performed by: ORTHOPAEDIC SURGERY

## 2024-12-17 PROCEDURE — 1101F PT FALLS ASSESS-DOCD LE1/YR: CPT | Mod: CPTII,,, | Performed by: ORTHOPAEDIC SURGERY

## 2024-12-17 PROCEDURE — 3075F SYST BP GE 130 - 139MM HG: CPT | Mod: CPTII,,, | Performed by: ORTHOPAEDIC SURGERY

## 2024-12-17 PROCEDURE — 1159F MED LIST DOCD IN RCRD: CPT | Mod: CPTII,,, | Performed by: ORTHOPAEDIC SURGERY

## 2024-12-17 PROCEDURE — 3008F BODY MASS INDEX DOCD: CPT | Mod: CPTII,,, | Performed by: ORTHOPAEDIC SURGERY

## 2024-12-17 NOTE — PROCEDURES
Large Joint Aspiration/Injection: bilateral knee    Date/Time: 12/17/2024 9:15 AM    Performed by: Israel Peñaloza MD  Authorized by: Israel Peñaloza MD    Consent Done?:  Yes (Verbal)  Indications:  Arthritis, joint swelling and pain  Timeout: prior to procedure the correct patient, procedure, and site was verified    Prep: patient was prepped and draped in usual sterile fashion      Details:  Needle Size:  21 G  Approach:  Anterolateral  Location:  Knee  Laterality:  Bilateral  Site:  Bilateral knee  Medications (Right):  32 mg triamcinolone acetonide 32 mg  Medications (Left):  32 mg triamcinolone acetonide 32 mg  Patient tolerance:  Patient tolerated the procedure well with no immediate complications

## 2024-12-17 NOTE — PROGRESS NOTES
Chief Complaint: No chief complaint on file.      History of present illness:    History of Present Illness  The patient presents for evaluation of bilateral knee pain.    She reports experiencing more severe pain in her left knee compared to the right. She has a history of receiving cortisone injections from Dr. Damon in [place], which initially provided relief but have since become less effective. Her last injection was administered in 2023. The duration of relief post-injection varies, with some instances providing no relief at all. She has been referred to our clinic by Dr. Albarran due to the distance of her previous treatment location. She is currently on Duexis and Plaquenil for inflammation management. She also reports difficulty walking long distances, such as in [facility], without the aid of a cart. An MRI scan revealed no bone-on-bone contact in her knees.    She has a known diagnosis of rheumatoid arthritis.    FAMILY HISTORY  She has a cousin who is totally crippled due to rheumatoid arthritis.    MEDICATIONS  Duexis, Plaquenil    Past Medical History:   Diagnosis Date    Acid reflux     GERD (gastroesophageal reflux disease)     Hypertension     Osteoporosis     Rheumatoid arthritis(714.0)     Rheumatoid arthritis       Past Surgical History:   Procedure Laterality Date    ADENOIDECTOMY      ANKLE LIGAMENT RECONSTRUCTION      CARPAL TUNNEL RELEASE Right     CHOLECYSTECTOMY      HYSTERECTOMY         Current Outpatient Medications   Medication Sig    amLODIPine (NORVASC) 5 MG tablet Take 1 tablet (5 mg total) by mouth once daily.    ATELVIA 35 mg TbEC Take 1 tablet (35 mg total) by mouth every 7 days.    clotrimazole-betamethasone 1-0.05% (LOTRISONE) cream APPLY TO AFFECTED AREA TWICE A DAY.    ergocalciferol (ERGOCALCIFEROL) 50,000 unit Cap Take 1 capsule (50,000 Units total) by mouth every 7 days.    fluticasone furoate-vilanteroL (BREO ELLIPTA) 200-25 mcg/dose DsDv diskus inhaler Inhale 1 puff into the  lungs once daily. Controller    gabapentin (NEURONTIN) 400 MG capsule Take 1 capsule (400 mg total) by mouth 3 (three) times daily. TAKE (1) CAPSULE BY MOUTH THREE TIMES DAILY.    hydroxychloroquine (PLAQUENIL) 200 mg tablet Take 1 tablet (200 mg total) by mouth 2 (two) times daily.    ibuprofen-famotidine (DUEXIS) 800-26.6 mg Tab Take 1 tablet by mouth 3 (three) times daily.    LACTOBACILLUS ACIDOPHILUS (PROBIOTIC ORAL) Take by mouth.    meclizine (ANTIVERT) 25 mg tablet Take 1 tablet (25 mg total) by mouth 3 (three) times daily as needed for Dizziness.    pantoprazole (PROTONIX) 40 MG tablet Take 1 tablet (40 mg total) by mouth once daily.    vitamin D 1000 units Tab Take 2,000 Units by mouth once daily.    azelastine (ASTELIN) 137 mcg (0.1 %) nasal spray  (Patient not taking: Reported on 2024)    DICLOFENAC SODIUM (PENNSAID TOP) Apply topically. (Patient not taking: Reported on 2024)    folic acid (FOLVITE) 1 MG tablet Take 1 mg by mouth once daily. (Patient not taking: Reported on 2024)     No current facility-administered medications for this visit.       Review of patient's allergies indicates:   Allergen Reactions    Morpholine analogues Itching    Aspirin     Penicillins     Percodan [oxycodone hcl-oxycodone-asa]     Reclast [zoledronic acid-mannitol-water]        Family History   Family history unknown: Yes       Social History     Socioeconomic History    Marital status:    Tobacco Use    Smoking status: Every Day     Current packs/day: 0.00     Types: Cigarettes     Last attempt to quit: 2015     Years since quittin.8    Smokeless tobacco: Never   Substance and Sexual Activity    Alcohol use: Yes     Alcohol/week: 4.0 standard drinks of alcohol     Types: 2 Glasses of wine, 2 Cans of beer per week     Comment: Rarely    Drug use: Yes     Types: Marijuana     Comment: Medical Marijuana    Sexual activity: Yes     Partners: Male           Review of Systems:    Constitution:  Negative for chills, fever, and sweats.  Negative for unexplained weight loss.    HENT:  Negative for headaches and blurry vision.    Cardiovascular:Negative for chest pain or irregular heart beat. Negative for hypertension.    Respiratory:  Negative for cough and shortness of breath.    Gastrointestinal: Negative for abdominal pain, heartburn, melena, nausea, and vomitting.    Genitourinary:  Negative bladder incontinence and dysuria.    Musculoskeletal:  See HPI    Neurological: Negative for numbness.    Psychiatric/Behavioral: Negative for depression.  The patient is not nervous/anxious.      Endocrine: Negative for polyuria    Hematologic/Lymphatic: Negative for bleeding problem.  Does not bruise/bleed easily.    Skin: Negative for poor would healing and rash      Physical Examination:    Vital Signs:    Vitals:    12/17/24 1007   BP: 136/73   Pulse: (!) 57       Body mass index is 38.23 kg/m².    General: No acute distress, alert and oriented, healthy appearing    HEENT: Head is atraumatic, mucous membranes are moist    Neck: Supples, no JVD    Cardiovascular: Palpable dorsalis pedis and posterior tibial pulses, regular rate and rhythm to those pulses    Lungs: Breathing non-labored    Skin: no rashes appreciated    Neurologic: Can flex and extend knees, ankles, and toes. Sensation is grossly intact    Bilateral knees:  Brisk cap refill disappeared sensation intact disappeared significant crepitus range of motion left knee.  Patient's left knee gets full extension.  Excellent flexion of 105°.    X-rays:  Four views bilateral knees reviewed.  Patient with end-stage osteoarthritis of the left as well as right knee.  Kellgren Wilder grade 3 changes to bilateral knees.     Assessment::  Bilateral knee osteoarthritis    Plan:  Discussed all treatment options the patient.  Patient has a approved for Zilretta injections.  She has tried cortisone in the past.  She would like to try Zilretta if this improved.  See him  back in 3 or 4 months.  Could repeat Zilretta injections if it works.  She will contact us prior to her appointment to send this for insurance approval if necessary.    This note was generated with the assistance of ambient listening technology. Verbal consent was obtained by the patient and accompanying visitor(s) for the recording of patient appointment to facilitate this note. I attest to having reviewed and edited the generated note for accuracy, though some syntax or spelling errors may persist. Please contact the author of this note for any clarification.      This note was created using [employer] voice recognition software that occasionally misinterpreted phrases or words.    Consult note is delivered via Epic messaging service.

## 2025-01-20 ENCOUNTER — TELEPHONE (OUTPATIENT)
Dept: RHEUMATOLOGY | Facility: CLINIC | Age: 70
End: 2025-01-20

## 2025-01-20 DIAGNOSIS — J84.9 INTERSTITIAL LUNG DISEASE: Primary | ICD-10-CM

## 2025-01-20 NOTE — TELEPHONE ENCOUNTER
----- Message from Jose Carlos Ochoa sent at 12/13/2024  9:56 AM CST -----    ----- Message -----  From: Gus Enrique  Sent: 12/13/2024   8:16 AM CST  To: Ashok [facility]onzo Staff    Type: Needs Medical Advice    Who Called:  Pt    Best Call Back Number: [phone]    Additional Information:Pt is calling in regards to results chest ct done on 12/03. Please call back to advise, Thanks!

## 2025-01-20 NOTE — TELEPHONE ENCOUNTER
CT scan reviewed: there are changes in the lungs that are concerning for chronic interstitial lung disease. She needs further evaluation with Pulmonary. There is atherosclerosis (plaque) noted in the arteries and she should follow up with her primary care for this. There is also non-obstructing kidney stone seen.     She will need a CT chest annually moving forward.

## 2025-01-23 ENCOUNTER — OFFICE VISIT (OUTPATIENT)
Dept: RHEUMATOLOGY | Facility: CLINIC | Age: 70
End: 2025-01-23
Payer: MEDICARE

## 2025-01-23 DIAGNOSIS — M05.9 SEROPOSITIVE RHEUMATOID ARTHRITIS: ICD-10-CM

## 2025-01-23 DIAGNOSIS — J45.909 ASTHMA, UNSPECIFIED ASTHMA SEVERITY, UNSPECIFIED WHETHER COMPLICATED, UNSPECIFIED WHETHER PERSISTENT: ICD-10-CM

## 2025-01-23 DIAGNOSIS — J84.9 ILD (INTERSTITIAL LUNG DISEASE): Primary | ICD-10-CM

## 2025-01-23 DIAGNOSIS — F17.200 TOBACCO DEPENDENCE: ICD-10-CM

## 2025-01-23 PROCEDURE — 1159F MED LIST DOCD IN RCRD: CPT | Mod: CPTII,95,, | Performed by: INTERNAL MEDICINE

## 2025-01-23 PROCEDURE — 98006 SYNCH AUDIO-VIDEO EST MOD 30: CPT | Mod: 95,,, | Performed by: INTERNAL MEDICINE

## 2025-01-23 PROCEDURE — 1160F RVW MEDS BY RX/DR IN RCRD: CPT | Mod: CPTII,95,, | Performed by: INTERNAL MEDICINE

## 2025-01-24 ENCOUNTER — TELEPHONE (OUTPATIENT)
Dept: RHEUMATOLOGY | Facility: CLINIC | Age: 70
End: 2025-01-24
Payer: MEDICARE

## 2025-02-03 NOTE — PROGRESS NOTES
Subjective:     Patient ID:  Gwadwoa Valencia    Chief Complaint:  Disease Management     History of Present Illness:  Mrs. Valencia is a 69 year old female who presents to telemedicine [facility] for follow up on seropositive rheumatoid arthritis.. She is waiting to see if she there is a troy available to make it more affordable. She does not want to consider alternative treatments at this time. She complains of increased pain in her L knee. She reports x-rays/MRI completed by Orthopedic local to her showed degenerative arthritis. She had steroid joint injection without relief. She is waiting for approval of viscous injection with insurance. She is having difficulty weight bearing on the L leg due to knee pain. She reports new pain in her low back since her knee is causing pain. She had MRI Lspine which she was told did not show significant spinal abnormalities. She is already taking gabapentin 400 mg qhs and can not tolerate higher doses due to swelling. Not sure how much gabapentin helps.     She is taking duexis and plaquenil consistently, which help with her pain.         Current tx:  1. Plaquenil  2. Duexis      Rheumatologic History:   - Diagnosis/es:  - Positive serologies:  - Infectious screening labs:  - Previous Treatments:  - Current Treatments:     Interval History:   Hospitalization since last office visit: No    Patient Active Problem List    Diagnosis Date Noted    Rheumatoid arthritis(714.0) 11/24/2014    Osteoarthritis of both hips 11/24/2014    Osteoporosis 11/24/2014     Past Surgical History:   Procedure Laterality Date    ADENOIDECTOMY      ANKLE LIGAMENT RECONSTRUCTION      CARPAL TUNNEL RELEASE Right     CHOLECYSTECTOMY      HYSTERECTOMY       Social History     Tobacco Use    Smoking status: Every Day     Current packs/day: 0.00     Types: Cigarettes     Last attempt to quit: 1/26/2015     Years since quitting: 10.0    Smokeless tobacco: Never   Substance Use Topics    Alcohol use: Yes      Alcohol/week: 4.0 standard drinks of alcohol     Types: 2 Glasses of wine, 2 Cans of beer per week     Comment: Rarely    Drug use: Yes     Types: Marijuana     Comment: Medical Marijuana     Family History   Family history unknown: Yes     Review of patient's allergies indicates:   Allergen Reactions    Morpholine analogues Itching    Aspirin     Penicillins     Percodan [oxycodone hcl-oxycodone-asa]     Reclast [zoledronic acid-mannitol-water]        Review of Systems   Review of Systems     Current Medications:  Current Outpatient Medications   Medication Instructions    amLODIPine (NORVASC) 5 mg, Oral, Daily    ATELVIA 35 mg, Oral, Every 7 days    azelastine (ASTELIN) 137 mcg (0.1 %) nasal spray No dose, route, or frequency recorded.    clotrimazole-betamethasone 1-0.05% (LOTRISONE) cream 2 times daily, Apply to affected area    DICLOFENAC SODIUM (PENNSAID TOP) Apply topically.    ergocalciferol (ERGOCALCIFEROL) 50,000 Units, Oral, Every 7 days    fluticasone furoate-vilanteroL (BREO ELLIPTA) 200-25 mcg/dose DsDv diskus inhaler 1 puff, Inhalation, Daily, Controller    folic acid (FOLVITE) 1 mg, Daily    gabapentin (NEURONTIN) 400 mg, Oral, 3 times daily, TAKE (1) CAPSULE BY MOUTH THREE TIMES DAILY.    hydroxychloroquine (PLAQUENIL) 200 mg, Oral, 2 times daily    ibuprofen-famotidine (DUEXIS) 800-26.6 mg Tab 1 tablet, Oral, 3 times daily    LACTOBACILLUS ACIDOPHILUS (PROBIOTIC ORAL) Take by mouth.    meclizine (ANTIVERT) 25 mg, Oral, 3 times daily PRN    pantoprazole (PROTONIX) 40 mg, Oral, Daily    vitamin D (VITAMIN D3) 2,000 Units, Daily         Objective:     There were no vitals filed for this visit.   There is no height or weight on file to calculate BMI.     Physical Examinations:  Physical Exam     Disease Assessment Scores:  Patient's Global Assessment of arthritis (0-10): 1  Physician's Global Assessment of arthritis (0-10): 1  Number of Tender Joints (0-28): 1  Number of Swollen Joints (0-28): 0       "   No data to display                Monitoring Lab Results:  Lab Results   Component Value Date    WBC 13.3 (H) 09/03/2024    RBC 4.58 09/03/2024    HGB 13.3 09/03/2024    HCT 41.3 09/03/2024    MCV 90 09/03/2024    MCH 29.0 09/03/2024    MCHC 32.2 09/03/2024    RDW 13.8 09/03/2024     09/03/2024        Lab Results   Component Value Date     09/03/2024    K 3.7 09/03/2024     09/03/2024    CO2 24 09/03/2024    GLU 84 09/03/2024    BUN 13 09/03/2024    CREATININE 0.63 09/03/2024    CALCIUM 9.6 09/03/2024    PROT 6.7 07/14/2020    ALBUMIN 4.0 09/03/2024    BILITOT 0.4 09/03/2024    ALKPHOS 124 (H) 07/14/2020    AST 18 09/03/2024    ALT 14 09/03/2024    EGFRNORACEVR 96 09/03/2024       Lab Results   Component Value Date    SEDRATE 40 09/03/2024    CRP 11 (H) 09/03/2024        Lab Results   Component Value Date    SENYAYRW58IY 63.4 09/03/2024        Lab Results   Component Value Date    CHOL 203 (H) 07/09/2019    HDL 81 07/09/2019    LDLCALC 110 (H) 07/09/2019    TRIG 60 07/09/2019       Lab Results   Component Value Date    RF 13.8 09/03/2024     Lab Results   Component Value Date    DSDNA <1 07/14/2020    SSAANTIBODY 0.2 04/17/2018    SSBANTIBODY <0.2 04/17/2018     No results found for: "HLABB27"    Infectious Disease Screening:  Lab Results   Component Value Date    HEPBSAG Negative 12/29/2023    HEPBCAB Negative 12/29/2023    HEPBSAB Non Reactive 12/29/2023     Lab Results   Component Value Date    HEPCVAB Non Reactive 12/29/2023     Lab Results   Component Value Date    QUANTTBGDPL Negative 12/29/2023     Lab Results   Component Value Date    QUANTNILVALU 0.03 12/29/2023    QUANTMITOGEN >10.00 12/29/2023        Imaging: DEXA, Xrays, MRIs, CTs, etc    Old & Outside Medical Records:  Reviewed old and all outside medical records available in Care Everywhere     Assessment:     Encounter Diagnoses   Name Primary?    ILD (interstitial lung disease) Yes    Asthma, unspecified asthma severity, " unspecified whether complicated, unspecified whether persistent     Seropositive rheumatoid arthritis     Tobacco dependence         Plan:      Encounter Diagnoses   Name Primary?    Asthma, unspecified asthma severity, unspecified whether complicated, unspecified whether persistent     ILD (interstitial lung disease) Yes    Seropositive rheumatoid arthritis     Tobacco dependence      Diagnoses and all orders for this visit:    ILD (interstitial lung disease)  -     Sedimentation rate; Future  -     C-Reactive Protein; Future  -     Comprehensive Metabolic Panel; Future  -     CBC Auto Differential; Future    Asthma, unspecified asthma severity, unspecified whether complicated, unspecified whether persistent  -     Sedimentation rate; Future  -     C-Reactive Protein; Future  -     Comprehensive Metabolic Panel; Future  -     CBC Auto Differential; Future    Seropositive rheumatoid arthritis  -     Sedimentation rate; Future  -     C-Reactive Protein; Future  -     Comprehensive Metabolic Panel; Future  -     CBC Auto Differential; Future    Tobacco dependence  -     Sedimentation rate; Future  -     C-Reactive Protein; Future  -     Comprehensive Metabolic Panel; Future  -     CBC Auto Differential; Future        1. labs  2. Refills meds  3. F/u 6 months     Follow-up 6 months    The patient location is: home  The chief complaint leading to consultation is: ra    Visit type: audiovisual    Face to Face time with patient: 30   minutes of total time spent on the encounter, which includes face to face time and non-face to face time preparing to see the patient (eg, review of tests), Obtaining and/or reviewing separately obtained history, Documenting clinical information in the electronic or other health record, Independently interpreting results (not separately reported) and communicating results to the patient/family/caregiver, or Care coordination (not separately reported).         Each patient to whom he or she  provides medical services by telemedicine is:  (1) informed of the relationship between the physician and patient and the respective role of any other health care provider with respect to management of the patient; and (2) notified that he or she may decline to receive medical services by telemedicine and may withdraw from such care at any time.    Notes:

## 2025-03-01 DIAGNOSIS — M16.0 PRIMARY OSTEOARTHRITIS OF BOTH HIPS: ICD-10-CM

## 2025-03-01 DIAGNOSIS — M81.0 OSTEOPOROSIS, UNSPECIFIED OSTEOPOROSIS TYPE, UNSPECIFIED PATHOLOGICAL FRACTURE PRESENCE: ICD-10-CM

## 2025-03-01 DIAGNOSIS — B37.9 CANDIDIASIS: ICD-10-CM

## 2025-03-01 DIAGNOSIS — M54.32 SCIATIC NERVE PAIN, LEFT: ICD-10-CM

## 2025-03-01 DIAGNOSIS — M19.90 OSTEOARTHRITIS, UNSPECIFIED OSTEOARTHRITIS TYPE, UNSPECIFIED SITE: ICD-10-CM

## 2025-03-01 DIAGNOSIS — M25.511 CHRONIC RIGHT SHOULDER PAIN: ICD-10-CM

## 2025-03-01 DIAGNOSIS — M05.9 SEROPOSITIVE RHEUMATOID ARTHRITIS: ICD-10-CM

## 2025-03-01 DIAGNOSIS — K21.00 GASTROESOPHAGEAL REFLUX DISEASE WITH ESOPHAGITIS WITHOUT HEMORRHAGE: ICD-10-CM

## 2025-03-01 DIAGNOSIS — M94.9 DISORDER OF CARTILAGE, UNSPECIFIED: ICD-10-CM

## 2025-03-01 DIAGNOSIS — G89.29 CHRONIC RIGHT SHOULDER PAIN: ICD-10-CM

## 2025-03-01 DIAGNOSIS — I10 PRIMARY HYPERTENSION: ICD-10-CM

## 2025-03-03 RX ORDER — RISEDRONATE SODIUM 30.1; 4.9 MG/1; MG/1
1 TABLET, DELAYED RELEASE ORAL
Qty: 12 TABLET | Refills: 1 | Status: SHIPPED | OUTPATIENT
Start: 2025-03-03

## 2025-04-10 ENCOUNTER — OFFICE VISIT (OUTPATIENT)
Dept: RHEUMATOLOGY | Facility: CLINIC | Age: 70
End: 2025-04-10
Payer: MEDICARE

## 2025-04-10 VITALS
DIASTOLIC BLOOD PRESSURE: 76 MMHG | SYSTOLIC BLOOD PRESSURE: 130 MMHG | HEART RATE: 65 BPM | HEIGHT: 65 IN | WEIGHT: 205 LBS | BODY MASS INDEX: 34.16 KG/M2

## 2025-04-10 DIAGNOSIS — M11.269 CHONDROCALCINOSIS DUE TO DICALCIUM PHOSPHATE CRYSTALS, OF THE KNEE, UNSPECIFIED LATERALITY: ICD-10-CM

## 2025-04-10 DIAGNOSIS — R94.6 ABNORMAL RESULTS OF THYROID FUNCTION STUDIES: ICD-10-CM

## 2025-04-10 DIAGNOSIS — J40 BRONCHITIS: ICD-10-CM

## 2025-04-10 DIAGNOSIS — I10 PRIMARY HYPERTENSION: ICD-10-CM

## 2025-04-10 DIAGNOSIS — M81.0 OSTEOPOROSIS, UNSPECIFIED OSTEOPOROSIS TYPE, UNSPECIFIED PATHOLOGICAL FRACTURE PRESENCE: ICD-10-CM

## 2025-04-10 DIAGNOSIS — M06.9 RHEUMATOID ARTHRITIS FLARE: ICD-10-CM

## 2025-04-10 DIAGNOSIS — M11.269 PSEUDOGOUT OF KNEE, UNSPECIFIED LATERALITY: Primary | ICD-10-CM

## 2025-04-10 DIAGNOSIS — M05.9 SEROPOSITIVE RHEUMATOID ARTHRITIS: ICD-10-CM

## 2025-04-10 DIAGNOSIS — M17.0 PRIMARY OSTEOARTHRITIS OF BOTH KNEES: ICD-10-CM

## 2025-04-10 DIAGNOSIS — B37.9 CANDIDIASIS: ICD-10-CM

## 2025-04-10 DIAGNOSIS — M19.90 OSTEOARTHRITIS, UNSPECIFIED OSTEOARTHRITIS TYPE, UNSPECIFIED SITE: ICD-10-CM

## 2025-04-10 DIAGNOSIS — M25.511 CHRONIC RIGHT SHOULDER PAIN: ICD-10-CM

## 2025-04-10 DIAGNOSIS — M25.551 PAIN OF RIGHT HIP: ICD-10-CM

## 2025-04-10 DIAGNOSIS — G89.29 CHRONIC RIGHT SHOULDER PAIN: ICD-10-CM

## 2025-04-10 DIAGNOSIS — M94.9 DISORDER OF CARTILAGE, UNSPECIFIED: ICD-10-CM

## 2025-04-10 DIAGNOSIS — G57.01 PIRIFORMIS SYNDROME OF RIGHT SIDE: ICD-10-CM

## 2025-04-10 DIAGNOSIS — M16.0 PRIMARY OSTEOARTHRITIS OF BOTH HIPS: ICD-10-CM

## 2025-04-10 DIAGNOSIS — M54.32 SCIATIC NERVE PAIN, LEFT: ICD-10-CM

## 2025-04-10 DIAGNOSIS — K21.00 GASTROESOPHAGEAL REFLUX DISEASE WITH ESOPHAGITIS WITHOUT HEMORRHAGE: ICD-10-CM

## 2025-04-10 PROCEDURE — 99999 PR PBB SHADOW E&M-EST. PATIENT-LVL III: CPT | Mod: PBBFAC,,, | Performed by: INTERNAL MEDICINE

## 2025-04-10 PROCEDURE — 99214 OFFICE O/P EST MOD 30 MIN: CPT | Mod: S$GLB,,, | Performed by: INTERNAL MEDICINE

## 2025-04-10 PROCEDURE — 1160F RVW MEDS BY RX/DR IN RCRD: CPT | Mod: CPTII,S$GLB,, | Performed by: INTERNAL MEDICINE

## 2025-04-10 PROCEDURE — 3075F SYST BP GE 130 - 139MM HG: CPT | Mod: CPTII,S$GLB,, | Performed by: INTERNAL MEDICINE

## 2025-04-10 PROCEDURE — 3288F FALL RISK ASSESSMENT DOCD: CPT | Mod: CPTII,S$GLB,, | Performed by: INTERNAL MEDICINE

## 2025-04-10 PROCEDURE — 1101F PT FALLS ASSESS-DOCD LE1/YR: CPT | Mod: CPTII,S$GLB,, | Performed by: INTERNAL MEDICINE

## 2025-04-10 PROCEDURE — 3078F DIAST BP <80 MM HG: CPT | Mod: CPTII,S$GLB,, | Performed by: INTERNAL MEDICINE

## 2025-04-10 PROCEDURE — 3008F BODY MASS INDEX DOCD: CPT | Mod: CPTII,S$GLB,, | Performed by: INTERNAL MEDICINE

## 2025-04-10 PROCEDURE — 1159F MED LIST DOCD IN RCRD: CPT | Mod: CPTII,S$GLB,, | Performed by: INTERNAL MEDICINE

## 2025-04-10 PROCEDURE — 1125F AMNT PAIN NOTED PAIN PRSNT: CPT | Mod: CPTII,S$GLB,, | Performed by: INTERNAL MEDICINE

## 2025-04-10 RX ORDER — COLCHICINE 0.6 MG/1
0.6 TABLET ORAL 2 TIMES DAILY
Qty: 60 TABLET | Refills: 2 | Status: SHIPPED | OUTPATIENT
Start: 2025-04-10 | End: 2025-07-09

## 2025-04-10 RX ORDER — PANTOPRAZOLE SODIUM 40 MG/1
40 TABLET, DELAYED RELEASE ORAL DAILY
Qty: 90 TABLET | Refills: 3 | Status: SHIPPED | OUTPATIENT
Start: 2025-04-10

## 2025-04-10 RX ORDER — HYDROXYCHLOROQUINE SULFATE 200 MG/1
200 TABLET, FILM COATED ORAL 2 TIMES DAILY
Qty: 180 TABLET | Refills: 3 | Status: SHIPPED | OUTPATIENT
Start: 2025-04-10

## 2025-04-10 RX ORDER — ASPIRIN 81 MG/1
81 TABLET ORAL DAILY
COMMUNITY

## 2025-04-10 RX ORDER — AZITHROMYCIN 250 MG/1
TABLET, FILM COATED ORAL
Qty: 6 TABLET | Refills: 0 | Status: SHIPPED | OUTPATIENT
Start: 2025-04-10 | End: 2025-04-15

## 2025-04-10 RX ORDER — IBUPROFEN AND FAMOTIDINE 26.6; 8 MG/1; MG/1
1 TABLET ORAL 3 TIMES DAILY
Qty: 270 TABLET | Refills: 1 | Status: SHIPPED | OUTPATIENT
Start: 2025-04-10 | End: 2026-04-10

## 2025-04-10 RX ORDER — HYDROCODONE BITARTRATE AND ACETAMINOPHEN 7.5; 325 MG/1; MG/1
1 TABLET ORAL EVERY 8 HOURS PRN
Qty: 21 TABLET | Refills: 0 | Status: SHIPPED | OUTPATIENT
Start: 2025-04-10 | End: 2025-04-17

## 2025-04-10 RX ORDER — AMLODIPINE BESYLATE 5 MG/1
5 TABLET ORAL DAILY
Qty: 90 TABLET | Refills: 3 | Status: SHIPPED | OUTPATIENT
Start: 2025-04-10

## 2025-04-10 RX ORDER — ERGOCALCIFEROL 1.25 MG/1
50000 CAPSULE ORAL
Qty: 12 CAPSULE | Refills: 3 | Status: SHIPPED | OUTPATIENT
Start: 2025-04-10

## 2025-04-10 RX ORDER — METHYLPREDNISOLONE 4 MG/1
TABLET ORAL
Qty: 21 TABLET | Refills: 0 | Status: SHIPPED | OUTPATIENT
Start: 2025-04-10

## 2025-04-10 NOTE — PROGRESS NOTES
Subjective:     Patient ID:  Gwadwoa Valencia    Chief Complaint:  Disease Management     History of Present Illness:  Pt is a 70 y.o. female Mrs. Valencia is a 69 year old female who presents to telemedicine virtual for follow up on seropositive rheumatoid arthritis.. She is waiting to see if she there is a troy available to make it more affordable. She does not want to consider alternative treatments at this time. She complains of increased pain in her L knee. She reports x-rays/MRI completed by Orthopedic local to her showed degenerative arthritis. She had steroid joint injection without relief. She is waiting for approval of viscous injection with insurance. She is having difficulty weight bearing on the L leg due to knee pain. She reports new pain in her low back since her knee is causing pain. She had MRI Lspine which she was told did not show significant spinal abnormalities. She is already taking gabapentin 400 mg qhs and can not tolerate higher doses due to swelling. Not sure how much gabapentin helps.     She is taking duexis and plaquenil consistently, which help with her pain.         Current tx:  1. Plaquenil  2. Duexis  Rheumatologic History:   - Diagnosis/es:  - Positive serologies:  - Infectious screening labs:  - Previous Treatments:  - Current Treatments:     Interval History:   Hospitalization since last office visit: No    Patient Active Problem List    Diagnosis Date Noted    Rheumatoid arthritis(714.0) 11/24/2014    Osteoarthritis of both hips 11/24/2014    Osteoporosis 11/24/2014     Past Surgical History:   Procedure Laterality Date    ADENOIDECTOMY      ANKLE LIGAMENT RECONSTRUCTION      CARPAL TUNNEL RELEASE Right     CHOLECYSTECTOMY      HYSTERECTOMY       Social History[1]  Family History   Family history unknown: Yes     Review of patient's allergies indicates:   Allergen Reactions    Morpholine analogues Itching    Aspirin     Penicillins     Percodan [oxycodone hcl-oxycodone-asa]      "Reclast [zoledronic acid-mannitol-water]        Review of Systems   Review of Systems     Current Medications:  Current Outpatient Medications   Medication Instructions    amLODIPine (NORVASC) 5 mg, Oral, Daily    aspirin (ECOTRIN) 81 mg, Daily    ATELVIA 35 mg, Oral, Every 7 days    azithromycin (Z-DARIA) 250 MG tablet Take 2 tablets by mouth on day 1; Take 1 tablet by mouth on days 2-5      clotrimazole-betamethasone 1-0.05% (LOTRISONE) cream 2 times daily, Apply to affected area    colchicine (COLCRYS) 0.6 mg, Oral, 2 times daily    ergocalciferol (ERGOCALCIFEROL) 50,000 Units, Oral, Every 7 days    fluticasone furoate-vilanteroL (BREO ELLIPTA) 200-25 mcg/dose DsDv diskus inhaler 1 puff, Inhalation, Daily, Controller    folic acid (FOLVITE) 1 mg, Daily    gabapentin (NEURONTIN) 400 mg, Oral, 3 times daily, TAKE (1) CAPSULE BY MOUTH THREE TIMES DAILY.    HYDROcodone-acetaminophen (NORCO) 7.5-325 mg per tablet 1 tablet, Oral, Every 8 hours PRN    hydroxychloroquine (PLAQUENIL) 200 mg, Oral, 2 times daily    ibuprofen-famotidine (DUEXIS) 800-26.6 mg Tab 1 tablet, Oral, 3 times daily    LACTOBACILLUS ACIDOPHILUS (PROBIOTIC ORAL) Take by mouth.    meclizine (ANTIVERT) 25 mg, Oral, 3 times daily PRN    methylPREDNISolone (MEDROL DOSEPACK) 4 mg tablet use as directed    pantoprazole (PROTONIX) 40 mg, Oral, Daily    vitamin D (VITAMIN D3) 2,000 Units, Daily         Objective:     Vitals:    04/10/25 1221   BP: 130/76   Pulse: 65   Weight: 93 kg (205 lb)   Height: 5' 5" (1.651 m)   PainSc: 10-Worst pain ever   PainLoc: Hip      Body mass index is 34.11 kg/m².     Physical Examinations:  Physical Exam   Constitutional: She is oriented to person, place, and time.   HENT:   Head: Normocephalic and atraumatic.   Mouth/Throat: Oropharynx is clear and moist.   Eyes: Pupils are equal, round, and reactive to light.   Neck: No thyromegaly present.   Cardiovascular: Normal rate, regular rhythm and normal heart sounds. Exam reveals no " gallop and no friction rub.   No murmur heard.  Pulmonary/Chest: Breath sounds normal. She has no wheezes. She has no rales. She exhibits no tenderness.   Abdominal: There is no abdominal tenderness. There is no rebound and no guarding.   Musculoskeletal:      Right shoulder: Tenderness present.      Left shoulder: Tenderness present.      Right elbow: Normal.      Left elbow: Normal.      Right wrist: Swelling and tenderness present.      Left wrist: Swelling and tenderness present.      Cervical back: Neck supple.      Right knee: No effusion. Tenderness present.      Left knee: No effusion. Tenderness present.      Left ankle: Swelling present.   Lymphadenopathy:     She has no cervical adenopathy.   Neurological: She is alert and oriented to person, place, and time. Gait normal.   Skin: No rash noted. No erythema. No pallor.   Psychiatric: Mood and affect normal.   Nursing note and vitals reviewed.      Right Side Rheumatological Exam     Examination finds the elbow normal.    The patient is tender to palpation of the shoulder, wrist, knee, 1st PIP, 1st MCP, 2nd PIP, 2nd MCP, 3rd PIP, 3rd MCP, 4th PIP, 4th MCP, 5th PIP and 5th MCP    She has swelling of the wrist, 1st PIP, 1st MCP, 2nd PIP, 2nd MCP, 3rd PIP, 3rd MCP, 4th PIP, 4th MCP, 5th PIP and 5th MCP    The patient has an enlarged wrist    Shoulder Exam   Tenderness Location: no tenderness    Range of Motion   Active abduction:  abnormal   Adduction: abnormal  Sensation: normal    Knee Exam   Tenderness Location: lateral joint line  Patellofemoral Crepitus: positive  Effusion: negative  Sensation: normal    Hip Exam   Tenderness Location: posterior  Sensation: normal    Elbow/Wrist Exam   Tenderness Location: no tenderness  Sensation: normal    Muscle Strength (0-5 scale):  Neck Flexion:  2  Neck Extension: 2  : 2/5     Left Side Rheumatological Exam     Examination finds the elbow normal.    The patient is tender to palpation of the shoulder, wrist,  knee, 1st PIP, 1st MCP, 2nd PIP, 2nd MCP, 3rd PIP, 3rd MCP, 4th PIP, 4th MCP, 5th PIP, 5th MCP and temporomandibular.    She has swelling of the wrist, 1st PIP, 1st MCP, 2nd PIP, 2nd MCP, 3rd PIP, 3rd MCP, 4th PIP, 4th MCP, 5th PIP, 5th MCP, 1st CMC, 2nd DIP, 3rd DIP, 4th DIP, 5th DIP, knee, 1st MTP, 2nd MTP, 3rd MTP, 4th MTP, 1st toe IP, 2nd toe IP, 3rd toe IP, 4th toe IP and 5th toe IP    The patient has an enlarged wrist, 1st CMC, 2nd DIP, 3rd DIP, 4th DIP, 5th DIP, 1st toe IP, 2nd toe IP, 3rd toe IP, 4th toe IP and 5th toe IP.    Shoulder Exam   Tenderness Location: acromioclavicular joint    Range of Motion   Active abduction:  abnormal   Sensation: normal    Knee Exam     Patellofemoral Crepitus: positive  Effusion: negative  Sensation: normal    Hip Exam   Tenderness Location: posterior  Sensation: normal    Elbow/Wrist Exam   Sensation: normal    Muscle Strength (0-5 scale):  Neck Flexion:  2  Neck Extension: 2  :  1/5       Back/Neck Exam   General Inspection   Gait: normal            Disease Assessment Scores:  Patient's Global Assessment of arthritis (0-10): 1  Physician's Global Assessment of arthritis (0-10): 1  Number of Tender Joints (0-28): 2  Number of Swollen Joints (0-28): 2         No data to display                Monitoring Lab Results:  Lab Results   Component Value Date    WBC 13.3 (H) 09/03/2024    RBC 4.58 09/03/2024    HGB 13.3 09/03/2024    HCT 41.3 09/03/2024    MCV 90 09/03/2024    MCH 29.0 09/03/2024    MCHC 32.2 09/03/2024    RDW 13.8 09/03/2024     09/03/2024        Lab Results   Component Value Date     09/03/2024    K 3.7 09/03/2024     09/03/2024    CO2 24 09/03/2024    GLU 84 09/03/2024    BUN 13 09/03/2024    CREATININE 0.63 09/03/2024    CALCIUM 9.6 09/03/2024    PROT 6.7 07/14/2020    ALBUMIN 4.0 09/03/2024    BILITOT 0.4 09/03/2024    ALKPHOS 124 (H) 07/14/2020    AST 18 09/03/2024    ALT 14 09/03/2024    EGFRNORACEVR 96 09/03/2024       Lab Results  "  Component Value Date    SEDRATE 40 09/03/2024    CRP 11 (H) 09/03/2024        Lab Results   Component Value Date    VHVTFRGZ46IU 63.4 09/03/2024        Lab Results   Component Value Date    CHOL 203 (H) 07/09/2019    HDL 81 07/09/2019    LDLCALC 110 (H) 07/09/2019    TRIG 60 07/09/2019       Lab Results   Component Value Date    RF 13.8 09/03/2024     Lab Results   Component Value Date    DSDNA <1 07/14/2020    SSAANTIBODY 0.2 04/17/2018    SSBANTIBODY <0.2 04/17/2018     No results found for: "HLABB27"    Infectious Disease Screening:  Lab Results   Component Value Date    HEPBSAG Negative 12/29/2023    HEPBCAB Negative 12/29/2023    HEPBSAB Non Reactive 12/29/2023     Lab Results   Component Value Date    HEPCVAB Non Reactive 12/29/2023     Lab Results   Component Value Date    QUANTTBGDPL Negative 12/29/2023     Lab Results   Component Value Date    QUANTNILVALU 0.03 12/29/2023    QUANTMITOGEN >10.00 12/29/2023        Imaging:  DEXA, Xrays, MRIs, CTs, etc    Old & Outside Medical Records:  Reviewed old and all outside medical records available in Care Everywhere     Assessment:     Encounter Diagnoses   Name Primary?    Pseudogout of knee, unspecified laterality Yes    Chondrocalcinosis due to dicalcium phosphate crystals, of the knee, unspecified laterality     Primary osteoarthritis of both knees     Bronchitis     Pain of right hip     Abnormal results of thyroid function studies     Piriformis syndrome of right side     Seropositive rheumatoid arthritis     Gastroesophageal reflux disease with esophagitis without hemorrhage     Rheumatoid arthritis flare     Osteoporosis, unspecified osteoporosis type, unspecified pathological fracture presence     Osteoarthritis, unspecified osteoarthritis type, unspecified site     Candidiasis     Sciatic nerve pain, left     Chronic right shoulder pain     Disorder of cartilage, unspecified     Primary hypertension     Primary osteoarthritis of both hips       Plan:    " "  Encounter Diagnoses   Name Primary?    Pseudogout of knee, unspecified laterality Yes    Chondrocalcinosis due to dicalcium phosphate crystals, of the knee, unspecified laterality     Primary osteoarthritis of both knees     Bronchitis     Pain of right hip     Abnormal results of thyroid function studies     Piriformis syndrome of right side     Seropositive rheumatoid arthritis     Gastroesophageal reflux disease with esophagitis without hemorrhage     Rheumatoid arthritis flare     Osteoporosis, unspecified osteoporosis type, unspecified pathological fracture presence     Osteoarthritis, unspecified osteoarthritis type, unspecified site     Candidiasis     Sciatic nerve pain, left     Chronic right shoulder pain     Disorder of cartilage, unspecified     Primary hypertension     Primary osteoarthritis of both hips      Sobeida Batres" was seen today for disease management.    Diagnoses and all orders for this visit:    Pseudogout of knee, unspecified laterality  -     colchicine (COLCRYS) 0.6 mg tablet; Take 1 tablet (0.6 mg total) by mouth 2 (two) times daily.  -     T4, Free; Future  -     TSH; Future  -     Cyclic Citrullinated Peptide Antibody, IgG; Future  -     Rheumatoid Factor; Future  -     Sedimentation rate; Future  -     C-Reactive Protein; Future  -     Comprehensive Metabolic Panel; Future  -     CBC Auto Differential; Future  -     T4, Free  -     TSH  -     Cyclic Citrullinated Peptide Antibody, IgG  -     Rheumatoid Factor  -     Sedimentation rate  -     C-Reactive Protein  -     Comprehensive Metabolic Panel  -     CBC Auto Differential    Chondrocalcinosis due to dicalcium phosphate crystals, of the knee, unspecified laterality  -     colchicine (COLCRYS) 0.6 mg tablet; Take 1 tablet (0.6 mg total) by mouth 2 (two) times daily.  -     T4, Free; Future  -     TSH; Future  -     Cyclic Citrullinated Peptide Antibody, IgG; Future  -     Rheumatoid Factor; Future  -     Sedimentation rate; " Future  -     C-Reactive Protein; Future  -     Comprehensive Metabolic Panel; Future  -     CBC Auto Differential; Future  -     T4, Free  -     TSH  -     Cyclic Citrullinated Peptide Antibody, IgG  -     Rheumatoid Factor  -     Sedimentation rate  -     C-Reactive Protein  -     Comprehensive Metabolic Panel  -     CBC Auto Differential    Primary osteoarthritis of both knees  -     colchicine (COLCRYS) 0.6 mg tablet; Take 1 tablet (0.6 mg total) by mouth 2 (two) times daily.  -     T4, Free; Future  -     TSH; Future  -     Cyclic Citrullinated Peptide Antibody, IgG; Future  -     Rheumatoid Factor; Future  -     Sedimentation rate; Future  -     C-Reactive Protein; Future  -     Comprehensive Metabolic Panel; Future  -     CBC Auto Differential; Future  -     T4, Free  -     TSH  -     Cyclic Citrullinated Peptide Antibody, IgG  -     Rheumatoid Factor  -     Sedimentation rate  -     C-Reactive Protein  -     Comprehensive Metabolic Panel  -     CBC Auto Differential  -     hydroxychloroquine (PLAQUENIL) 200 mg tablet; Take 1 tablet (200 mg total) by mouth 2 (two) times daily.  -     pantoprazole (PROTONIX) 40 MG tablet; Take 1 tablet (40 mg total) by mouth once daily.  -     ibuprofen-famotidine (DUEXIS) 800-26.6 mg Tab; Take 1 tablet by mouth 3 (three) times daily.  -     ergocalciferol (ERGOCALCIFEROL) 50,000 unit Cap; Take 1 capsule (50,000 Units total) by mouth every 7 days.  -     amLODIPine (NORVASC) 5 MG tablet; Take 1 tablet (5 mg total) by mouth once daily.  -     HYDROcodone-acetaminophen (NORCO) 7.5-325 mg per tablet; Take 1 tablet by mouth every 8 (eight) hours as needed for Pain.    Bronchitis  -     azithromycin (Z-DARIA) 250 MG tablet; Take 2 tablets by mouth on day 1; Take 1 tablet by mouth on days 2-5  -     methylPREDNISolone (MEDROL DOSEPACK) 4 mg tablet; use as directed  -     T4, Free; Future  -     TSH; Future  -     Cyclic Citrullinated Peptide Antibody, IgG; Future  -     Rheumatoid  Factor; Future  -     Sedimentation rate; Future  -     C-Reactive Protein; Future  -     Comprehensive Metabolic Panel; Future  -     CBC Auto Differential; Future  -     T4, Free  -     TSH  -     Cyclic Citrullinated Peptide Antibody, IgG  -     Rheumatoid Factor  -     Sedimentation rate  -     C-Reactive Protein  -     Comprehensive Metabolic Panel  -     CBC Auto Differential    Pain of right hip  -     azithromycin (Z-DARIA) 250 MG tablet; Take 2 tablets by mouth on day 1; Take 1 tablet by mouth on days 2-5  -     X-Ray KUB; Future  -     X-Ray Hip 2 or 3 views Left with Pelvis when performed; Future  -     X-Ray KUB  -     X-Ray Hip 2 or 3 views Left with Pelvis when performed  -     T4, Free; Future  -     TSH; Future  -     Cyclic Citrullinated Peptide Antibody, IgG; Future  -     Rheumatoid Factor; Future  -     Sedimentation rate; Future  -     C-Reactive Protein; Future  -     Comprehensive Metabolic Panel; Future  -     CBC Auto Differential; Future  -     T4, Free  -     TSH  -     Cyclic Citrullinated Peptide Antibody, IgG  -     Rheumatoid Factor  -     Sedimentation rate  -     C-Reactive Protein  -     Comprehensive Metabolic Panel  -     CBC Auto Differential    Abnormal results of thyroid function studies  -     T4, Free; Future  -     TSH; Future  -     Cyclic Citrullinated Peptide Antibody, IgG; Future  -     Rheumatoid Factor; Future  -     Sedimentation rate; Future  -     C-Reactive Protein; Future  -     Comprehensive Metabolic Panel; Future  -     CBC Auto Differential; Future  -     T4, Free  -     TSH  -     Cyclic Citrullinated Peptide Antibody, IgG  -     Rheumatoid Factor  -     Sedimentation rate  -     C-Reactive Protein  -     Comprehensive Metabolic Panel  -     CBC Auto Differential    Piriformis syndrome of right side  -     hydroxychloroquine (PLAQUENIL) 200 mg tablet; Take 1 tablet (200 mg total) by mouth 2 (two) times daily.  -     pantoprazole (PROTONIX) 40 MG tablet; Take 1  tablet (40 mg total) by mouth once daily.  -     ibuprofen-famotidine (DUEXIS) 800-26.6 mg Tab; Take 1 tablet by mouth 3 (three) times daily.  -     ergocalciferol (ERGOCALCIFEROL) 50,000 unit Cap; Take 1 capsule (50,000 Units total) by mouth every 7 days.  -     amLODIPine (NORVASC) 5 MG tablet; Take 1 tablet (5 mg total) by mouth once daily.  -     HYDROcodone-acetaminophen (NORCO) 7.5-325 mg per tablet; Take 1 tablet by mouth every 8 (eight) hours as needed for Pain.    Seropositive rheumatoid arthritis  -     hydroxychloroquine (PLAQUENIL) 200 mg tablet; Take 1 tablet (200 mg total) by mouth 2 (two) times daily.  -     pantoprazole (PROTONIX) 40 MG tablet; Take 1 tablet (40 mg total) by mouth once daily.  -     ibuprofen-famotidine (DUEXIS) 800-26.6 mg Tab; Take 1 tablet by mouth 3 (three) times daily.  -     ergocalciferol (ERGOCALCIFEROL) 50,000 unit Cap; Take 1 capsule (50,000 Units total) by mouth every 7 days.  -     amLODIPine (NORVASC) 5 MG tablet; Take 1 tablet (5 mg total) by mouth once daily.  -     HYDROcodone-acetaminophen (NORCO) 7.5-325 mg per tablet; Take 1 tablet by mouth every 8 (eight) hours as needed for Pain.    Gastroesophageal reflux disease with esophagitis without hemorrhage  -     pantoprazole (PROTONIX) 40 MG tablet; Take 1 tablet (40 mg total) by mouth once daily.  -     ergocalciferol (ERGOCALCIFEROL) 50,000 unit Cap; Take 1 capsule (50,000 Units total) by mouth every 7 days.    Rheumatoid arthritis flare  -     hydroxychloroquine (PLAQUENIL) 200 mg tablet; Take 1 tablet (200 mg total) by mouth 2 (two) times daily.  -     pantoprazole (PROTONIX) 40 MG tablet; Take 1 tablet (40 mg total) by mouth once daily.  -     ibuprofen-famotidine (DUEXIS) 800-26.6 mg Tab; Take 1 tablet by mouth 3 (three) times daily.  -     ergocalciferol (ERGOCALCIFEROL) 50,000 unit Cap; Take 1 capsule (50,000 Units total) by mouth every 7 days.  -     amLODIPine (NORVASC) 5 MG tablet; Take 1 tablet (5 mg total)  by mouth once daily.  -     HYDROcodone-acetaminophen (NORCO) 7.5-325 mg per tablet; Take 1 tablet by mouth every 8 (eight) hours as needed for Pain.    Osteoporosis, unspecified osteoporosis type, unspecified pathological fracture presence  -     ergocalciferol (ERGOCALCIFEROL) 50,000 unit Cap; Take 1 capsule (50,000 Units total) by mouth every 7 days.    Osteoarthritis, unspecified osteoarthritis type, unspecified site  -     ergocalciferol (ERGOCALCIFEROL) 50,000 unit Cap; Take 1 capsule (50,000 Units total) by mouth every 7 days.    Candidiasis  -     ergocalciferol (ERGOCALCIFEROL) 50,000 unit Cap; Take 1 capsule (50,000 Units total) by mouth every 7 days.    Sciatic nerve pain, left  -     ergocalciferol (ERGOCALCIFEROL) 50,000 unit Cap; Take 1 capsule (50,000 Units total) by mouth every 7 days.    Chronic right shoulder pain  -     ergocalciferol (ERGOCALCIFEROL) 50,000 unit Cap; Take 1 capsule (50,000 Units total) by mouth every 7 days.    Disorder of cartilage, unspecified  -     ergocalciferol (ERGOCALCIFEROL) 50,000 unit Cap; Take 1 capsule (50,000 Units total) by mouth every 7 days.    Primary hypertension  -     ergocalciferol (ERGOCALCIFEROL) 50,000 unit Cap; Take 1 capsule (50,000 Units total) by mouth every 7 days.  -     amLODIPine (NORVASC) 5 MG tablet; Take 1 tablet (5 mg total) by mouth once daily.    Primary osteoarthritis of both hips  -     ergocalciferol (ERGOCALCIFEROL) 50,000 unit Cap; Take 1 capsule (50,000 Units total) by mouth every 7 days.          1. xray  2. Labs   3. refills     Follow-up 4 months    More than 50% of the  33 minute encounter was spent face to face counseling the patient regarding current status and future plan of care as well as side effects  of the medications. All questions were answered to patient's satisfaction also includes  non-face to face time preparing to see the patient (eg, review of tests), Obtaining and/or reviewing separately obtained history,  Documenting clinical information in the electronic or other health record, Independently interpreting results            [1]   Social History  Tobacco Use    Smoking status: Every Day     Current packs/day: 0.00     Types: Cigarettes     Last attempt to quit: 1/26/2015     Years since quitting: 10.2    Smokeless tobacco: Never   Substance Use Topics    Alcohol use: Yes     Alcohol/week: 4.0 standard drinks of alcohol     Types: 2 Glasses of wine, 2 Cans of beer per week     Comment: Rarely    Drug use: Yes     Types: Marijuana     Comment: Medical Marijuana

## 2025-04-15 DIAGNOSIS — M17.11 PRIMARY OSTEOARTHRITIS OF RIGHT KNEE: ICD-10-CM

## 2025-04-15 DIAGNOSIS — M17.12 PRIMARY OSTEOARTHRITIS OF LEFT KNEE: Primary | ICD-10-CM

## 2025-04-17 ENCOUNTER — OFFICE VISIT (OUTPATIENT)
Dept: ORTHOPEDICS | Facility: CLINIC | Age: 70
End: 2025-04-17
Payer: MEDICARE

## 2025-04-17 VITALS
SYSTOLIC BLOOD PRESSURE: 154 MMHG | HEIGHT: 65 IN | DIASTOLIC BLOOD PRESSURE: 80 MMHG | BODY MASS INDEX: 34.16 KG/M2 | HEART RATE: 57 BPM | WEIGHT: 205 LBS

## 2025-04-17 DIAGNOSIS — M17.12 PRIMARY OSTEOARTHRITIS OF LEFT KNEE: ICD-10-CM

## 2025-04-17 DIAGNOSIS — M17.11 PRIMARY OSTEOARTHRITIS OF RIGHT KNEE: Primary | ICD-10-CM

## 2025-04-17 NOTE — PROGRESS NOTES
Chief Complaint:   Chief Complaint   Patient presents with    Knee Pain     B/L Zilretta injections. Pt states the injection only help for about a month and a day. She would like some clarifications in regards visco supplementation.        History of present illness:    History of Present Illness  The patient presents for evaluation of knee pain.    The chief complaint is knee pain. The previous Zilretta injection provided relief for a duration of 32 days. Gel or rooster comb injections have not yet been tried. Pain is currently manageable, with discomfort only occurring during missteps. Another injection is believed to be potentially beneficial. A return visit is planned for July 2025, and if the treatment does not yield the same results as the initial injection, alternative options will be considered. Surgical intervention is acknowledged as necessary but cannot proceed until the following year. Cortisone injections have been received previously, providing inconsistent relief, typically lasting up to 6 months. Consultations with the rheumatologist occur biannually, with injections received during each visit. The most recent consultation with Dr. Vargas was last week, during which no treatment was received in anticipation of today's visit.    SOCIAL HISTORY  Occupations: Private caregiver      Past Medical History:   Diagnosis Date    Acid reflux     GERD (gastroesophageal reflux disease)     Hypertension     Osteoporosis     Rheumatoid arthritis(714.0)     Rheumatoid arthritis       Past Surgical History:   Procedure Laterality Date    ADENOIDECTOMY      ANKLE LIGAMENT RECONSTRUCTION      CARPAL TUNNEL RELEASE Right     CHOLECYSTECTOMY      HYSTERECTOMY         Current Outpatient Medications   Medication Sig    amLODIPine (NORVASC) 5 MG tablet Take 1 tablet (5 mg total) by mouth once daily.    aspirin (ECOTRIN) 81 MG EC tablet Take 81 mg by mouth once daily.    ATELVIA 35 mg TbEC TAKE ONE TABLET BY MOUTH EVERY 7  DAYS    clotrimazole-betamethasone 1-0.05% (LOTRISONE) cream APPLY TO AFFECTED AREA TWICE A DAY.    colchicine (COLCRYS) 0.6 mg tablet Take 1 tablet (0.6 mg total) by mouth 2 (two) times daily.    ergocalciferol (ERGOCALCIFEROL) 50,000 unit Cap Take 1 capsule (50,000 Units total) by mouth every 7 days.    fluticasone furoate-vilanteroL (BREO ELLIPTA) 200-25 mcg/dose DsDv diskus inhaler Inhale 1 puff into the lungs once daily. Controller    HYDROcodone-acetaminophen (NORCO) 7.5-325 mg per tablet Take 1 tablet by mouth every 8 (eight) hours as needed for Pain.    hydroxychloroquine (PLAQUENIL) 200 mg tablet Take 1 tablet (200 mg total) by mouth 2 (two) times daily.    ibuprofen-famotidine (DUEXIS) 800-26.6 mg Tab Take 1 tablet by mouth 3 (three) times daily.    LACTOBACILLUS ACIDOPHILUS (PROBIOTIC ORAL) Take by mouth.    meclizine (ANTIVERT) 25 mg tablet Take 1 tablet (25 mg total) by mouth 3 (three) times daily as needed for Dizziness.    methylPREDNISolone (MEDROL DOSEPACK) 4 mg tablet use as directed    pantoprazole (PROTONIX) 40 MG tablet Take 1 tablet (40 mg total) by mouth once daily.    vitamin D 1000 units Tab Take 2,000 Units by mouth once daily.    folic acid (FOLVITE) 1 MG tablet Take 1 mg by mouth once daily. (Patient not taking: Reported on 4/17/2025)    gabapentin (NEURONTIN) 400 MG capsule Take 1 capsule (400 mg total) by mouth 3 (three) times daily. TAKE (1) CAPSULE BY MOUTH THREE TIMES DAILY. (Patient not taking: Reported on 4/17/2025)     No current facility-administered medications for this visit.       Review of patient's allergies indicates:   Allergen Reactions    Morpholine analogues Itching    Aspirin     Penicillins     Percodan [oxycodone hcl-oxycodone-asa]     Reclast [zoledronic acid-mannitol-water]        Family History   Family history unknown: Yes       Social History[1]        Review of Systems:    Constitution: Negative for chills, fever, and sweats.  Negative for unexplained weight  loss.    HENT:  Negative for headaches and blurry vision.    Cardiovascular:Negative for chest pain or irregular heart beat. Negative for hypertension.    Respiratory:  Negative for cough and shortness of breath.    Gastrointestinal: Negative for abdominal pain, heartburn, melena, nausea, and vomitting.    Genitourinary:  Negative bladder incontinence and dysuria.    Musculoskeletal:  See HPI    Neurological: Negative for numbness.    Psychiatric/Behavioral: Negative for depression.  The patient is not nervous/anxious.      Endocrine: Negative for polyuria    Hematologic/Lymphatic: Negative for bleeding problem.  Does not bruise/bleed easily.    Skin: Negative for poor would healing and rash      Physical Examination:    Vital Signs:    Vitals:    04/17/25 1106   BP: (!) 154/80   Pulse: (!) 57       Body mass index is 34.11 kg/m².    General: No acute distress, alert and oriented, healthy appearing    HEENT: Head is atraumatic, mucous membranes are moist    Neck: Supples, no JVD    Cardiovascular: Palpable dorsalis pedis and posterior tibial pulses, regular rate and rhythm to those pulses    Lungs: Breathing non-labored    Skin: no rashes appreciated    Neurologic: Can flex and extend knees, ankles, and toes. Sensation is grossly intact    Bilateral knees:  Varus alignment of both knees.  It is brisk brisk capillary refill distally.  Sensation intact disappeared range of motion of both knees is limited.  She gets extension of 5.  Flexion of 110.  Crepitus range of motion.    X-rays:  Bilateral knee osteoarthritis     Assessment::  Bilateral knee osteoarthritis    Plan:  Discussed all treatment with the patient.  Patient elects Zilretta injections.  This has worked the best.  She has not tried any Synvisc in recent history we could try this with the next time.  She would like to continue with the Zilretta in the interim however.  We will get her approved for this see her back months repeat injection    This note was  generated with the assistance of ambient listening technology. Verbal consent was obtained by the patient and accompanying visitor(s) for the recording of patient appointment to facilitate this note. I attest to having reviewed and edited the generated note for accuracy, though some syntax or spelling errors may persist. Please contact the author of this note for any clarification.      This note was created using [facility] voice recognition software that occasionally misinterpreted phrases or words.    Consult note is delivered via Epic messaging service.         [1]   Social History  Socioeconomic History    Marital status:    Tobacco Use    Smoking status: Every Day     Current packs/day: 0.00     Types: Cigarettes     Last attempt to quit: 1/26/2015     Years since quitting: 10.2    Smokeless tobacco: Never   Substance and Sexual Activity    Alcohol use: Yes     Alcohol/week: 4.0 standard drinks of alcohol     Types: 2 Glasses of wine, 2 Cans of beer per week     Comment: Rarely    Drug use: Yes     Types: Marijuana     Comment: Medical Marijuana    Sexual activity: Yes     Partners: Male

## 2025-05-31 DIAGNOSIS — M17.0 PRIMARY OSTEOARTHRITIS OF BOTH KNEES: ICD-10-CM

## 2025-05-31 DIAGNOSIS — M94.9 DISORDER OF CARTILAGE, UNSPECIFIED: ICD-10-CM

## 2025-05-31 DIAGNOSIS — B37.9 CANDIDIASIS: ICD-10-CM

## 2025-05-31 DIAGNOSIS — M54.32 SCIATIC NERVE PAIN, LEFT: ICD-10-CM

## 2025-05-31 DIAGNOSIS — M19.90 OSTEOARTHRITIS, UNSPECIFIED OSTEOARTHRITIS TYPE, UNSPECIFIED SITE: ICD-10-CM

## 2025-05-31 DIAGNOSIS — G89.29 CHRONIC RIGHT SHOULDER PAIN: ICD-10-CM

## 2025-05-31 DIAGNOSIS — I10 PRIMARY HYPERTENSION: ICD-10-CM

## 2025-05-31 DIAGNOSIS — M16.0 PRIMARY OSTEOARTHRITIS OF BOTH HIPS: ICD-10-CM

## 2025-05-31 DIAGNOSIS — M25.511 CHRONIC RIGHT SHOULDER PAIN: ICD-10-CM

## 2025-05-31 DIAGNOSIS — M05.9 SEROPOSITIVE RHEUMATOID ARTHRITIS: ICD-10-CM

## 2025-05-31 DIAGNOSIS — M81.0 OSTEOPOROSIS, UNSPECIFIED OSTEOPOROSIS TYPE, UNSPECIFIED PATHOLOGICAL FRACTURE PRESENCE: ICD-10-CM

## 2025-05-31 DIAGNOSIS — G57.01 PIRIFORMIS SYNDROME OF RIGHT SIDE: ICD-10-CM

## 2025-05-31 DIAGNOSIS — K21.00 GASTROESOPHAGEAL REFLUX DISEASE WITH ESOPHAGITIS WITHOUT HEMORRHAGE: ICD-10-CM

## 2025-05-31 DIAGNOSIS — M06.9 RHEUMATOID ARTHRITIS FLARE: ICD-10-CM

## 2025-06-03 RX ORDER — ERGOCALCIFEROL 1.25 MG/1
50000 CAPSULE ORAL
Qty: 12 CAPSULE | Refills: 3 | Status: SHIPPED | OUTPATIENT
Start: 2025-06-03

## 2025-07-07 DIAGNOSIS — G89.29 CHRONIC RIGHT SHOULDER PAIN: ICD-10-CM

## 2025-07-07 DIAGNOSIS — M54.32 SCIATIC NERVE PAIN, LEFT: ICD-10-CM

## 2025-07-07 DIAGNOSIS — M25.511 CHRONIC RIGHT SHOULDER PAIN: ICD-10-CM

## 2025-07-07 DIAGNOSIS — G57.01 PIRIFORMIS SYNDROME OF RIGHT SIDE: ICD-10-CM

## 2025-07-07 DIAGNOSIS — K21.00 GASTROESOPHAGEAL REFLUX DISEASE WITH ESOPHAGITIS WITHOUT HEMORRHAGE: ICD-10-CM

## 2025-07-07 DIAGNOSIS — B37.9 CANDIDIASIS: ICD-10-CM

## 2025-07-07 DIAGNOSIS — R21 RASH: ICD-10-CM

## 2025-07-07 DIAGNOSIS — M05.9 SEROPOSITIVE RHEUMATOID ARTHRITIS: ICD-10-CM

## 2025-07-07 DIAGNOSIS — M94.9 DISORDER OF CARTILAGE, UNSPECIFIED: ICD-10-CM

## 2025-07-07 DIAGNOSIS — M19.90 OSTEOARTHRITIS, UNSPECIFIED OSTEOARTHRITIS TYPE, UNSPECIFIED SITE: ICD-10-CM

## 2025-07-07 DIAGNOSIS — M17.0 PRIMARY OSTEOARTHRITIS OF BOTH KNEES: ICD-10-CM

## 2025-07-07 DIAGNOSIS — M06.9 RHEUMATOID ARTHRITIS FLARE: ICD-10-CM

## 2025-07-07 DIAGNOSIS — I10 PRIMARY HYPERTENSION: ICD-10-CM

## 2025-07-07 DIAGNOSIS — M16.0 PRIMARY OSTEOARTHRITIS OF BOTH HIPS: ICD-10-CM

## 2025-07-07 DIAGNOSIS — M81.0 OSTEOPOROSIS, UNSPECIFIED OSTEOPOROSIS TYPE, UNSPECIFIED PATHOLOGICAL FRACTURE PRESENCE: ICD-10-CM

## 2025-07-08 RX ORDER — IBUPROFEN AND FAMOTIDINE 26.6; 8 MG/1; MG/1
1 TABLET ORAL 3 TIMES DAILY
Qty: 270 TABLET | Refills: 3 | Status: SHIPPED | OUTPATIENT
Start: 2025-07-08

## 2025-07-08 RX ORDER — CLOTRIMAZOLE AND BETAMETHASONE DIPROPIONATE 10; .64 MG/G; MG/G
CREAM TOPICAL 2 TIMES DAILY
Qty: 45 G | Refills: 3 | Status: SHIPPED | OUTPATIENT
Start: 2025-07-08

## 2025-07-22 ENCOUNTER — OFFICE VISIT (OUTPATIENT)
Dept: ORTHOPEDICS | Facility: CLINIC | Age: 70
End: 2025-07-22
Payer: MEDICARE

## 2025-07-22 VITALS
BODY MASS INDEX: 33.43 KG/M2 | HEIGHT: 65 IN | DIASTOLIC BLOOD PRESSURE: 76 MMHG | SYSTOLIC BLOOD PRESSURE: 112 MMHG | HEART RATE: 71 BPM | WEIGHT: 200.63 LBS

## 2025-07-22 DIAGNOSIS — M17.12 PRIMARY OSTEOARTHRITIS OF LEFT KNEE: ICD-10-CM

## 2025-07-22 DIAGNOSIS — M17.11 PRIMARY OSTEOARTHRITIS OF RIGHT KNEE: Primary | ICD-10-CM

## 2025-07-22 NOTE — PROGRESS NOTES
Chief Complaint:   Chief Complaint   Patient presents with    Injections     SANDY zilretta 4/17/25 - Patient is unsure whether she wants to do more zilretta inj today. Possibly wanting to discuss surgery. Patient states that both knees hurt about the same, says they throb and aching. R knee bothers her more at night. Not using anything for relief.        History of present illness:  History of Present Illness    CHIEF COMPLAINT:  - Bilateral knee pain, right knee more bothersome.    HPI:  Sobeida presents for evaluation of bilateral knee pain due to long-standing arthritis, with the right knee being more bothersome. Pain is described as throbbing and aching, worsening at night and by the end of the day. She reports pain even when not walking, indicating a significant concern. The pain impacts daily activities and quality of life, with her expressing a desire to rest.    She has tried anti-inflammatory medications and injections for management. Recent injections provided some relief, but the effects do not last the expected 3-4 months. She expresses dissatisfaction with current treatment outcomes and reluctance towards knee replacement, while acknowledging it may be necessary.    She denies having heart issues.    PREVIOUS TREATMENTS:  - Sobeida has received injections for knee pain, which provided some relief but did not last the full expected 3-4 months.  - Sobeida has been taking anti-inflammatory medications for arthritis.      ROS:  Musculoskeletal: +joint pain, +joint swelling, +limb pain, +nightime pain          Past Medical History:   Diagnosis Date    Acid reflux     GERD (gastroesophageal reflux disease)     Hypertension     Osteoporosis     Rheumatoid arthritis(714.0)     Rheumatoid arthritis       Past Surgical History:   Procedure Laterality Date    ADENOIDECTOMY      ANKLE LIGAMENT RECONSTRUCTION      CARPAL TUNNEL RELEASE Right     CHOLECYSTECTOMY      HYSTERECTOMY         Current Outpatient  Medications   Medication Sig    amLODIPine (NORVASC) 5 MG tablet Take 1 tablet (5 mg total) by mouth once daily.    aspirin (ECOTRIN) 81 MG EC tablet Take 81 mg by mouth once daily.    ATELVIA 35 mg TbEC TAKE ONE TABLET BY MOUTH EVERY 7 DAYS    clotrimazole-betamethasone 1-0.05% (LOTRISONE) cream APPLY TO AFFECTED AREA TWICE A DAY.    ergocalciferol (ERGOCALCIFEROL) 50,000 unit Cap TAKE 1 CAPSULE BY MOUTH EVERY 7 DAYS    fluticasone furoate-vilanteroL (BREO ELLIPTA) 200-25 mcg/dose DsDv diskus inhaler Inhale 1 puff into the lungs once daily. Controller    hydroxychloroquine (PLAQUENIL) 200 mg tablet Take 1 tablet (200 mg total) by mouth 2 (two) times daily.    ibuprofen-famotidine (DUEXIS) 800-26.6 mg Tab TAKE ONE TABLET BY MOUTH THREE TIMES DAILY    LACTOBACILLUS ACIDOPHILUS (PROBIOTIC ORAL) Take by mouth.    pantoprazole (PROTONIX) 40 MG tablet Take 1 tablet (40 mg total) by mouth once daily.    vitamin D 1000 units Tab Take 2,000 Units by mouth once daily.    colchicine (COLCRYS) 0.6 mg tablet Take 1 tablet (0.6 mg total) by mouth 2 (two) times daily.    folic acid (FOLVITE) 1 MG tablet Take 1 mg by mouth once daily. (Patient not taking: Reported on 7/22/2025)    gabapentin (NEURONTIN) 400 MG capsule Take 1 capsule (400 mg total) by mouth 3 (three) times daily. TAKE (1) CAPSULE BY MOUTH THREE TIMES DAILY. (Patient not taking: Reported on 4/17/2025)    meclizine (ANTIVERT) 25 mg tablet Take 1 tablet (25 mg total) by mouth 3 (three) times daily as needed for Dizziness. (Patient not taking: Reported on 7/22/2025)    methylPREDNISolone (MEDROL DOSEPACK) 4 mg tablet use as directed (Patient not taking: Reported on 7/22/2025)     No current facility-administered medications for this visit.       Review of patient's allergies indicates:   Allergen Reactions    Morpholine analogues Itching    Aspirin     Penicillins     Percodan [oxycodone hcl-oxycodone-asa]     Reclast [zoledronic acid-mannitol-water]        Family  History   Family history unknown: Yes       Social History[1]        Review of Systems:    Constitution: Negative for chills, fever, and sweats.  Negative for unexplained weight loss.    HENT:  Negative for headaches and blurry vision.    Cardiovascular:Negative for chest pain or irregular heart beat. Negative for hypertension.    Respiratory:  Negative for cough and shortness of breath.    Gastrointestinal: Negative for abdominal pain, heartburn, melena, nausea, and vomitting.    Genitourinary:  Negative bladder incontinence and dysuria.    Musculoskeletal:  See HPI    Neurological: Negative for numbness.    Psychiatric/Behavioral: Negative for depression.  The patient is not nervous/anxious.      Endocrine: Negative for polyuria    Hematologic/Lymphatic: Negative for bleeding problem.  Does not bruise/bleed easily.    Skin: Negative for poor would healing and rash      Physical Examination:    Vital Signs:    Vitals:    07/22/25 1109   BP: 112/76   Pulse: 71       Body mass index is 33.38 kg/m².    General: No acute distress, alert and oriented, healthy appearing    HEENT: Head is atraumatic, mucous membranes are moist    Neck: Supples, no JVD    Cardiovascular: Palpable dorsalis pedis and posterior tibial pulses, regular rate and rhythm to those pulses    Lungs: Breathing non-labored    Skin: no rashes appreciated    Neurologic: Can flex and extend knees, ankles, and toes. Sensation is grossly intact    Bilateral knees: Significant crepitus range of motion.  Brisk cap refill to with the foot.  Sensation intact to with the foot.  Crepitus range of motion.  Right as well as left.    X-rays:  Three views bilateral knees reviewed today.  Patient with end-stage osteoarthritis.  It had with the joint space and bone-on-bone articulation.  Kellgren Wilder grade 4 changes of the bilateral knees.     Assessment::  Right knee osteoarthritis    Plan:  Discussed all treatment options with the patient. At this point the  patient is tried and failed all conservative management with regards to their knee. They have tried and failed nonoperative management including: Anti-inflammatories, activity modification, injections. All of these have failed to completely remove their pain. They have pain going up and down stairs as well as walking on level ground. The knee pain is affecting activities of daily living They feel that theyve reached a point of disability with regards to their knee. X-rays reveal advanced, end-stage degenerative osteoarthritis as noted by subchondral sclerosis, joint space narrowing in the medial, lateral, patellofemoral compartment, and periarticular osteophytes. The patient would like to proceed with surgical intervention and would be a good candidate for total knee replacement.    Total knee arthroplasty procedure, alternatives, risks, and benefits were discussed in detail. The risks including but not limited to: infection, need for revision surgery, pain, swelling, loosening, injury to surrounding neurovascular structures, stiffness, incomplete resolution of pain, DVT, PE, and death were discussed in detail. Despite these risks, the patient would like to proceed with surgical intervention. All questions were answered, no guarantees made. Will plan for right TKA on late August.  Patient is aware of this she has not have any assistance at home, her surgery we would be canceled..      This note was generated with the assistance of ambient listening technology. Verbal consent was obtained by the patient and accompanying visitor(s) for the recording of patient appointment to facilitate this note. I attest to having reviewed and edited the generated note for accuracy, though some syntax or spelling errors may persist. Please contact the author of this note for any clarification.       This note was created using [employer] voice recognition software that occasionally misinterpreted phrases or words.    Consult note is  delivered via Epic messaging service.         [1]   Social History  Socioeconomic History    Marital status:    Tobacco Use    Smoking status: Every Day     Current packs/day: 0.00     Types: Cigarettes     Last attempt to quit: 1/26/2015     Years since quitting: 10.4    Smokeless tobacco: Never   Substance and Sexual Activity    Alcohol use: Yes     Alcohol/week: 4.0 standard drinks of alcohol     Types: 2 Glasses of wine, 2 Cans of beer per week     Comment: Rarely    Drug use: Yes     Types: Marijuana     Comment: Medical Marijuana    Sexual activity: Yes     Partners: Male

## 2025-07-23 DIAGNOSIS — M17.11 PRIMARY OSTEOARTHRITIS OF RIGHT KNEE: Primary | ICD-10-CM

## 2025-08-04 ENCOUNTER — TELEPHONE (OUTPATIENT)
Dept: ORTHOPEDICS | Facility: CLINIC | Age: 70
End: 2025-08-04
Payer: MEDICARE

## 2025-08-04 DIAGNOSIS — M25.511 CHRONIC RIGHT SHOULDER PAIN: ICD-10-CM

## 2025-08-04 DIAGNOSIS — M54.32 SCIATIC NERVE PAIN, LEFT: ICD-10-CM

## 2025-08-04 DIAGNOSIS — G89.29 CHRONIC RIGHT SHOULDER PAIN: ICD-10-CM

## 2025-08-04 DIAGNOSIS — K21.00 GASTROESOPHAGEAL REFLUX DISEASE WITH ESOPHAGITIS WITHOUT HEMORRHAGE: ICD-10-CM

## 2025-08-04 DIAGNOSIS — M05.9 SEROPOSITIVE RHEUMATOID ARTHRITIS: ICD-10-CM

## 2025-08-04 DIAGNOSIS — M81.0 OSTEOPOROSIS, UNSPECIFIED OSTEOPOROSIS TYPE, UNSPECIFIED PATHOLOGICAL FRACTURE PRESENCE: ICD-10-CM

## 2025-08-04 DIAGNOSIS — I10 PRIMARY HYPERTENSION: ICD-10-CM

## 2025-08-04 DIAGNOSIS — M17.0 PRIMARY OSTEOARTHRITIS OF BOTH KNEES: ICD-10-CM

## 2025-08-04 DIAGNOSIS — M19.90 OSTEOARTHRITIS, UNSPECIFIED OSTEOARTHRITIS TYPE, UNSPECIFIED SITE: ICD-10-CM

## 2025-08-04 DIAGNOSIS — M06.9 RHEUMATOID ARTHRITIS FLARE: ICD-10-CM

## 2025-08-04 DIAGNOSIS — B37.9 CANDIDIASIS: ICD-10-CM

## 2025-08-04 DIAGNOSIS — M94.9 DISORDER OF CARTILAGE, UNSPECIFIED: ICD-10-CM

## 2025-08-04 DIAGNOSIS — G57.01 PIRIFORMIS SYNDROME OF RIGHT SIDE: ICD-10-CM

## 2025-08-04 DIAGNOSIS — M16.0 PRIMARY OSTEOARTHRITIS OF BOTH HIPS: ICD-10-CM

## 2025-08-04 RX ORDER — PANTOPRAZOLE SODIUM 40 MG/1
40 TABLET, DELAYED RELEASE ORAL
Qty: 90 TABLET | Refills: 3 | Status: SHIPPED | OUTPATIENT
Start: 2025-08-04

## 2025-08-04 RX ORDER — HYDROXYCHLOROQUINE SULFATE 200 MG/1
200 TABLET, FILM COATED ORAL 2 TIMES DAILY
Qty: 180 TABLET | Refills: 3 | Status: SHIPPED | OUTPATIENT
Start: 2025-08-04

## 2025-08-04 RX ORDER — RISEDRONATE SODIUM 35 MG/1
1 TABLET, DELAYED RELEASE ORAL
Qty: 12 TABLET | Refills: 3 | Status: SHIPPED | OUTPATIENT
Start: 2025-08-04

## 2025-08-06 ENCOUNTER — TELEPHONE (OUTPATIENT)
Dept: ORTHOPEDICS | Facility: CLINIC | Age: 70
End: 2025-08-06
Payer: MEDICARE

## 2025-08-06 NOTE — TELEPHONE ENCOUNTER
Contacted patient and gave number to financial clearance department to discuss what is stated below. Patient voiced a clear understanding.

## 2025-08-06 NOTE — TELEPHONE ENCOUNTER
Patient called with c/o ingrown toenail and wanted to know if she could go get it taken care of prior to TKA scheduled 8-27. Advised pt it is ok to go get it removed preferable from a podiatrist, verbalized understanding.

## 2025-08-13 ENCOUNTER — ANESTHESIA EVENT (OUTPATIENT)
Dept: SURGERY | Facility: HOSPITAL | Age: 70
End: 2025-08-13
Payer: MEDICARE

## 2025-08-13 ENCOUNTER — CLINICAL SUPPORT (OUTPATIENT)
Dept: LAB | Facility: HOSPITAL | Age: 70
End: 2025-08-13
Attending: PHYSICIAN ASSISTANT
Payer: MEDICARE

## 2025-08-13 ENCOUNTER — OFFICE VISIT (OUTPATIENT)
Dept: ORTHOPEDICS | Facility: CLINIC | Age: 70
End: 2025-08-13
Payer: MEDICARE

## 2025-08-13 ENCOUNTER — HOSPITAL ENCOUNTER (OUTPATIENT)
Dept: RADIOLOGY | Facility: HOSPITAL | Age: 70
Discharge: HOME | End: 2025-08-13
Attending: ORTHOPAEDIC SURGERY
Payer: MEDICARE

## 2025-08-13 ENCOUNTER — HOSPITAL ENCOUNTER (OUTPATIENT)
Dept: RADIOLOGY | Facility: HOSPITAL | Age: 70
Discharge: HOME | End: 2025-08-13
Attending: PHYSICIAN ASSISTANT
Payer: MEDICARE

## 2025-08-13 VITALS
HEART RATE: 51 BPM | SYSTOLIC BLOOD PRESSURE: 114 MMHG | HEIGHT: 65 IN | WEIGHT: 200.63 LBS | BODY MASS INDEX: 33.43 KG/M2 | DIASTOLIC BLOOD PRESSURE: 79 MMHG

## 2025-08-13 DIAGNOSIS — M19.90 OSTEOARTHRITIS: ICD-10-CM

## 2025-08-13 DIAGNOSIS — M17.11 PRIMARY OSTEOARTHRITIS OF RIGHT KNEE: Primary | ICD-10-CM

## 2025-08-13 DIAGNOSIS — R79.9 ABNORMAL BLOOD CHEMISTRY: ICD-10-CM

## 2025-08-13 DIAGNOSIS — Z01.818 PRE-OP TESTING: ICD-10-CM

## 2025-08-13 DIAGNOSIS — M17.11 PRIMARY OSTEOARTHRITIS OF RIGHT KNEE: ICD-10-CM

## 2025-08-13 LAB
OHS QRS DURATION: 82 MS
OHS QTC CALCULATION: 412 MS

## 2025-08-13 PROCEDURE — 93005 ELECTROCARDIOGRAM TRACING: CPT

## 2025-08-13 PROCEDURE — 93010 ELECTROCARDIOGRAM REPORT: CPT | Mod: ,,, | Performed by: INTERNAL MEDICINE

## 2025-08-13 PROCEDURE — 71046 X-RAY EXAM CHEST 2 VIEWS: CPT | Mod: TC

## 2025-08-13 PROCEDURE — 73700 CT LOWER EXTREMITY W/O DYE: CPT | Mod: TC,RT

## 2025-08-13 RX ORDER — ACETAMINOPHEN 500 MG
1000 TABLET ORAL
OUTPATIENT
Start: 2025-08-13

## 2025-08-13 RX ORDER — TRANEXAMIC ACID 650 MG/1
1950 TABLET ORAL
OUTPATIENT
Start: 2025-08-13 | End: 2025-08-13

## 2025-08-13 RX ORDER — ONDANSETRON 4 MG/1
4 TABLET, ORALLY DISINTEGRATING ORAL
OUTPATIENT
Start: 2025-08-13

## 2025-08-13 RX ORDER — GABAPENTIN 100 MG/1
300 CAPSULE ORAL
OUTPATIENT
Start: 2025-08-13

## 2025-08-13 RX ORDER — SODIUM CHLORIDE, SODIUM GLUCONATE, SODIUM ACETATE, POTASSIUM CHLORIDE AND MAGNESIUM CHLORIDE 30; 37; 368; 526; 502 MG/100ML; MG/100ML; MG/100ML; MG/100ML; MG/100ML
INJECTION, SOLUTION INTRAVENOUS CONTINUOUS
OUTPATIENT
Start: 2025-08-13

## 2025-08-13 RX ORDER — TAMSULOSIN HYDROCHLORIDE 0.4 MG/1
0.4 CAPSULE ORAL
OUTPATIENT
Start: 2025-08-13

## 2025-08-13 RX ORDER — SCOPOLAMINE 1 MG/3D
1 PATCH, EXTENDED RELEASE TRANSDERMAL ONCE AS NEEDED
OUTPATIENT
Start: 2025-08-13 | End: 2037-01-09

## 2025-08-21 ENCOUNTER — TELEPHONE (OUTPATIENT)
Dept: ORTHOPEDICS | Facility: CLINIC | Age: 70
End: 2025-08-21
Payer: MEDICARE

## 2025-08-26 ENCOUNTER — TELEPHONE (OUTPATIENT)
Dept: ORTHOPEDICS | Facility: CLINIC | Age: 70
End: 2025-08-26
Payer: MEDICARE

## 2025-08-27 ENCOUNTER — HOSPITAL ENCOUNTER (OUTPATIENT)
Facility: HOSPITAL | Age: 70
Discharge: HOME | End: 2025-08-29
Attending: ORTHOPAEDIC SURGERY | Admitting: ORTHOPAEDIC SURGERY
Payer: MEDICARE

## 2025-08-27 ENCOUNTER — ANESTHESIA (OUTPATIENT)
Dept: SURGERY | Facility: HOSPITAL | Age: 70
End: 2025-08-27
Payer: MEDICARE

## 2025-08-27 DIAGNOSIS — M17.11 PRIMARY OSTEOARTHRITIS OF RIGHT KNEE: ICD-10-CM

## 2025-08-27 DIAGNOSIS — M19.90 OSTEOARTHRITIS: ICD-10-CM

## 2025-08-27 LAB
HCT VFR BLD AUTO: 37.8 % (ref 37–47)
HGB BLD-MCNC: 12.2 G/DL (ref 12–16)
POCT GLUCOSE: 102 MG/DL (ref 70–110)
POCT GLUCOSE: 94 MG/DL (ref 70–110)

## 2025-08-27 PROCEDURE — 71000033 HC RECOVERY, INTIAL HOUR: Performed by: ORTHOPAEDIC SURGERY

## 2025-08-27 PROCEDURE — 94761 N-INVAS EAR/PLS OXIMETRY MLT: CPT

## 2025-08-27 PROCEDURE — 25000003 PHARM REV CODE 250: Performed by: PHYSICIAN ASSISTANT

## 2025-08-27 PROCEDURE — 94799 UNLISTED PULMONARY SVC/PX: CPT

## 2025-08-27 PROCEDURE — 97161 PT EVAL LOW COMPLEX 20 MIN: CPT

## 2025-08-27 PROCEDURE — 63600175 PHARM REV CODE 636 W HCPCS: Performed by: ORTHOPAEDIC SURGERY

## 2025-08-27 PROCEDURE — G0378 HOSPITAL OBSERVATION PER HR: HCPCS

## 2025-08-27 PROCEDURE — 63600175 PHARM REV CODE 636 W HCPCS: Performed by: PHYSICIAN ASSISTANT

## 2025-08-27 PROCEDURE — 36000713 HC OR TIME LEV V EA ADD 15 MIN: Performed by: ORTHOPAEDIC SURGERY

## 2025-08-27 PROCEDURE — 51798 US URINE CAPACITY MEASURE: CPT

## 2025-08-27 PROCEDURE — 63600175 PHARM REV CODE 636 W HCPCS: Performed by: ANESTHESIOLOGY

## 2025-08-27 PROCEDURE — 36415 COLL VENOUS BLD VENIPUNCTURE: CPT | Performed by: ORTHOPAEDIC SURGERY

## 2025-08-27 PROCEDURE — 0055T BONE SRGRY CMPTR CT/MRI IMAG: CPT | Mod: ,,, | Performed by: ORTHOPAEDIC SURGERY

## 2025-08-27 PROCEDURE — 27000221 HC OXYGEN, UP TO 24 HOURS

## 2025-08-27 PROCEDURE — 85014 HEMATOCRIT: CPT | Performed by: ORTHOPAEDIC SURGERY

## 2025-08-27 PROCEDURE — 37000008 HC ANESTHESIA 1ST 15 MINUTES: Performed by: ORTHOPAEDIC SURGERY

## 2025-08-27 PROCEDURE — C1713 ANCHOR/SCREW BN/BN,TIS/BN: HCPCS | Performed by: ORTHOPAEDIC SURGERY

## 2025-08-27 PROCEDURE — 51798 US URINE CAPACITY MEASURE: CPT | Performed by: ORTHOPAEDIC SURGERY

## 2025-08-27 PROCEDURE — 25000003 PHARM REV CODE 250: Performed by: NURSE PRACTITIONER

## 2025-08-27 PROCEDURE — C1776 JOINT DEVICE (IMPLANTABLE): HCPCS | Performed by: ORTHOPAEDIC SURGERY

## 2025-08-27 PROCEDURE — 25000003 PHARM REV CODE 250: Performed by: ORTHOPAEDIC SURGERY

## 2025-08-27 PROCEDURE — 27447 TOTAL KNEE ARTHROPLASTY: CPT | Mod: RT,,, | Performed by: ORTHOPAEDIC SURGERY

## 2025-08-27 PROCEDURE — 27201423 OPTIME MED/SURG SUP & DEVICES STERILE SUPPLY: Performed by: ORTHOPAEDIC SURGERY

## 2025-08-27 PROCEDURE — 99900031 HC PATIENT EDUCATION (STAT)

## 2025-08-27 PROCEDURE — 99900035 HC TECH TIME PER 15 MIN (STAT)

## 2025-08-27 PROCEDURE — 63600175 PHARM REV CODE 636 W HCPCS

## 2025-08-27 PROCEDURE — 63600175 PHARM REV CODE 636 W HCPCS: Mod: JZ,TB | Performed by: NURSE PRACTITIONER

## 2025-08-27 PROCEDURE — 37000009 HC ANESTHESIA EA ADD 15 MINS: Performed by: ORTHOPAEDIC SURGERY

## 2025-08-27 PROCEDURE — 25000003 PHARM REV CODE 250

## 2025-08-27 PROCEDURE — A4216 STERILE WATER/SALINE, 10 ML: HCPCS | Performed by: ORTHOPAEDIC SURGERY

## 2025-08-27 PROCEDURE — 36000712 HC OR TIME LEV V 1ST 15 MIN: Performed by: ORTHOPAEDIC SURGERY

## 2025-08-27 PROCEDURE — 82962 GLUCOSE BLOOD TEST: CPT | Performed by: ORTHOPAEDIC SURGERY

## 2025-08-27 DEVICE — BASEPLATE TRITANIUM 44MM SZ 3: Type: IMPLANTABLE DEVICE | Site: KNEE | Status: FUNCTIONAL

## 2025-08-27 DEVICE — COMP FEM CR BEAD SZ 3 RIGHT: Type: IMPLANTABLE DEVICE | Site: KNEE | Status: FUNCTIONAL

## 2025-08-27 DEVICE — IMPLANTABLE DEVICE: Type: IMPLANTABLE DEVICE | Site: KNEE | Status: FUNCTIONAL

## 2025-08-27 RX ORDER — BUPIVACAINE HYDROCHLORIDE 7.5 MG/ML
INJECTION, SOLUTION EPIDURAL; RETROBULBAR
Status: COMPLETED | OUTPATIENT
Start: 2025-08-27 | End: 2025-08-27

## 2025-08-27 RX ORDER — MORPHINE SULFATE 4 MG/ML
4 INJECTION, SOLUTION INTRAMUSCULAR; INTRAVENOUS
Status: ACTIVE | OUTPATIENT
Start: 2025-08-27 | End: 2025-08-28

## 2025-08-27 RX ORDER — ONDANSETRON HYDROCHLORIDE 2 MG/ML
4 INJECTION, SOLUTION INTRAVENOUS DAILY PRN
Status: DISCONTINUED | OUTPATIENT
Start: 2025-08-27 | End: 2025-08-27 | Stop reason: HOSPADM

## 2025-08-27 RX ORDER — METOCLOPRAMIDE HYDROCHLORIDE 5 MG/ML
10 INJECTION INTRAMUSCULAR; INTRAVENOUS
Status: COMPLETED | OUTPATIENT
Start: 2025-08-27 | End: 2025-08-28

## 2025-08-27 RX ORDER — ONDANSETRON 4 MG/1
4 TABLET, ORALLY DISINTEGRATING ORAL
Status: COMPLETED | OUTPATIENT
Start: 2025-08-27 | End: 2025-08-27

## 2025-08-27 RX ORDER — GABAPENTIN 300 MG/1
300 CAPSULE ORAL
Status: COMPLETED | OUTPATIENT
Start: 2025-08-27 | End: 2025-08-27

## 2025-08-27 RX ORDER — HYDROMORPHONE HYDROCHLORIDE 2 MG/ML
0.2 INJECTION, SOLUTION INTRAMUSCULAR; INTRAVENOUS; SUBCUTANEOUS EVERY 5 MIN PRN
Status: DISCONTINUED | OUTPATIENT
Start: 2025-08-27 | End: 2025-08-27 | Stop reason: HOSPADM

## 2025-08-27 RX ORDER — MORPHINE SULFATE 10 MG/ML
INJECTION INTRAMUSCULAR; INTRAVENOUS; SUBCUTANEOUS
Status: DISPENSED
Start: 2025-08-27 | End: 2025-08-27

## 2025-08-27 RX ORDER — METHOCARBAMOL 750 MG/1
750 TABLET, FILM COATED ORAL EVERY 6 HOURS PRN
Status: DISCONTINUED | OUTPATIENT
Start: 2025-08-27 | End: 2025-08-29 | Stop reason: HOSPADM

## 2025-08-27 RX ORDER — DOCUSATE SODIUM 100 MG/1
200 CAPSULE, LIQUID FILLED ORAL
Status: DISCONTINUED | OUTPATIENT
Start: 2025-08-28 | End: 2025-08-29 | Stop reason: HOSPADM

## 2025-08-27 RX ORDER — TRAMADOL HYDROCHLORIDE 50 MG/1
50 TABLET, FILM COATED ORAL EVERY 4 HOURS PRN
Status: DISCONTINUED | OUTPATIENT
Start: 2025-08-27 | End: 2025-08-27

## 2025-08-27 RX ORDER — MIDAZOLAM HYDROCHLORIDE 1 MG/ML
INJECTION INTRAMUSCULAR; INTRAVENOUS
Status: DISCONTINUED | OUTPATIENT
Start: 2025-08-27 | End: 2025-08-27

## 2025-08-27 RX ORDER — KETOROLAC TROMETHAMINE 30 MG/ML
INJECTION, SOLUTION INTRAMUSCULAR; INTRAVENOUS
Status: DISCONTINUED | OUTPATIENT
Start: 2025-08-27 | End: 2025-08-27 | Stop reason: HOSPADM

## 2025-08-27 RX ORDER — SODIUM CHLORIDE 9 MG/ML
INJECTION, SOLUTION INTRAMUSCULAR; INTRAVENOUS; SUBCUTANEOUS
Status: DISCONTINUED | OUTPATIENT
Start: 2025-08-27 | End: 2025-08-27 | Stop reason: HOSPADM

## 2025-08-27 RX ORDER — KETOROLAC TROMETHAMINE 10 MG/1
10 TABLET, FILM COATED ORAL EVERY 6 HOURS
Status: DISCONTINUED | OUTPATIENT
Start: 2025-08-27 | End: 2025-08-27

## 2025-08-27 RX ORDER — SODIUM CHLORIDE 0.9 % (FLUSH) 0.9 %
SYRINGE (ML) INJECTION
Status: DISPENSED
Start: 2025-08-27 | End: 2025-08-27

## 2025-08-27 RX ORDER — LIDOCAINE HYDROCHLORIDE 20 MG/ML
INJECTION INTRAVENOUS
Status: DISCONTINUED | OUTPATIENT
Start: 2025-08-27 | End: 2025-08-27

## 2025-08-27 RX ORDER — TALC
6 POWDER (GRAM) TOPICAL NIGHTLY PRN
Status: DISCONTINUED | OUTPATIENT
Start: 2025-08-27 | End: 2025-08-29 | Stop reason: HOSPADM

## 2025-08-27 RX ORDER — LIDOCAINE HYDROCHLORIDE 10 MG/ML
1 INJECTION, SOLUTION EPIDURAL; INFILTRATION; INTRACAUDAL; PERINEURAL ONCE
Status: DISCONTINUED | OUTPATIENT
Start: 2025-08-27 | End: 2025-08-28

## 2025-08-27 RX ORDER — KETOROLAC TROMETHAMINE 30 MG/ML
30 INJECTION, SOLUTION INTRAMUSCULAR; INTRAVENOUS ONCE
Status: COMPLETED | OUTPATIENT
Start: 2025-08-27 | End: 2025-08-27

## 2025-08-27 RX ORDER — AMLODIPINE BESYLATE 5 MG/1
5 TABLET ORAL DAILY
Status: DISCONTINUED | OUTPATIENT
Start: 2025-08-28 | End: 2025-08-29 | Stop reason: HOSPADM

## 2025-08-27 RX ORDER — VANCOMYCIN HYDROCHLORIDE 1 G/20ML
INJECTION, POWDER, LYOPHILIZED, FOR SOLUTION INTRAVENOUS
Status: DISCONTINUED | OUTPATIENT
Start: 2025-08-27 | End: 2025-08-27 | Stop reason: HOSPADM

## 2025-08-27 RX ORDER — VANCOMYCIN HYDROCHLORIDE 1 G/20ML
INJECTION, POWDER, LYOPHILIZED, FOR SOLUTION INTRAVENOUS
Status: DISPENSED
Start: 2025-08-27 | End: 2025-08-27

## 2025-08-27 RX ORDER — TAMSULOSIN HYDROCHLORIDE 0.4 MG/1
0.4 CAPSULE ORAL
Status: DISCONTINUED | OUTPATIENT
Start: 2025-08-27 | End: 2025-08-28

## 2025-08-27 RX ORDER — SODIUM CHLORIDE, SODIUM GLUCONATE, SODIUM ACETATE, POTASSIUM CHLORIDE AND MAGNESIUM CHLORIDE 30; 37; 368; 526; 502 MG/100ML; MG/100ML; MG/100ML; MG/100ML; MG/100ML
INJECTION, SOLUTION INTRAVENOUS CONTINUOUS
Status: DISCONTINUED | OUTPATIENT
Start: 2025-08-27 | End: 2025-08-27

## 2025-08-27 RX ORDER — AMOXICILLIN 250 MG
2 CAPSULE ORAL 2 TIMES DAILY
Status: DISCONTINUED | OUTPATIENT
Start: 2025-08-27 | End: 2025-08-29 | Stop reason: HOSPADM

## 2025-08-27 RX ORDER — ACETAMINOPHEN 10 MG/ML
1000 INJECTION, SOLUTION INTRAVENOUS ONCE
Status: COMPLETED | OUTPATIENT
Start: 2025-08-27 | End: 2025-08-27

## 2025-08-27 RX ORDER — HYDROCODONE BITARTRATE AND ACETAMINOPHEN 5; 325 MG/1; MG/1
1 TABLET ORAL EVERY 4 HOURS PRN
Refills: 0 | Status: DISCONTINUED | OUTPATIENT
Start: 2025-08-27 | End: 2025-08-28

## 2025-08-27 RX ORDER — ACETAMINOPHEN 10 MG/ML
1000 INJECTION, SOLUTION INTRAVENOUS ONCE
Status: DISCONTINUED | OUTPATIENT
Start: 2025-08-27 | End: 2025-08-27 | Stop reason: HOSPADM

## 2025-08-27 RX ORDER — CEFAZOLIN SODIUM 1 G/3ML
INJECTION, POWDER, FOR SOLUTION INTRAMUSCULAR; INTRAVENOUS
Status: DISCONTINUED | OUTPATIENT
Start: 2025-08-27 | End: 2025-08-27

## 2025-08-27 RX ORDER — ADHESIVE BANDAGE
30 BANDAGE TOPICAL DAILY PRN
Status: DISCONTINUED | OUTPATIENT
Start: 2025-08-27 | End: 2025-08-29 | Stop reason: HOSPADM

## 2025-08-27 RX ORDER — GABAPENTIN 300 MG/1
300 CAPSULE ORAL NIGHTLY
Status: DISCONTINUED | OUTPATIENT
Start: 2025-08-27 | End: 2025-08-28

## 2025-08-27 RX ORDER — MORPHINE SULFATE 10 MG/ML
INJECTION INTRAMUSCULAR; INTRAVENOUS; SUBCUTANEOUS
Status: DISCONTINUED | OUTPATIENT
Start: 2025-08-27 | End: 2025-08-27 | Stop reason: HOSPADM

## 2025-08-27 RX ORDER — ACETAMINOPHEN 500 MG
500 TABLET ORAL EVERY 4 HOURS
Status: DISCONTINUED | OUTPATIENT
Start: 2025-08-28 | End: 2025-08-28

## 2025-08-27 RX ORDER — DIPHENHYDRAMINE HYDROCHLORIDE 50 MG/ML
25 INJECTION, SOLUTION INTRAMUSCULAR; INTRAVENOUS EVERY 6 HOURS PRN
Status: DISCONTINUED | OUTPATIENT
Start: 2025-08-27 | End: 2025-08-27 | Stop reason: HOSPADM

## 2025-08-27 RX ORDER — SODIUM CHLORIDE, SODIUM GLUCONATE, SODIUM ACETATE, POTASSIUM CHLORIDE AND MAGNESIUM CHLORIDE 30; 37; 368; 526; 502 MG/100ML; MG/100ML; MG/100ML; MG/100ML; MG/100ML
INJECTION, SOLUTION INTRAVENOUS CONTINUOUS
Status: DISCONTINUED | OUTPATIENT
Start: 2025-08-27 | End: 2025-08-28

## 2025-08-27 RX ORDER — MIDAZOLAM HYDROCHLORIDE 2 MG/2ML
2 INJECTION, SOLUTION INTRAMUSCULAR; INTRAVENOUS ONCE AS NEEDED
Status: DISCONTINUED | OUTPATIENT
Start: 2025-08-27 | End: 2025-08-28

## 2025-08-27 RX ORDER — ACETAMINOPHEN 500 MG
1000 TABLET ORAL
Status: COMPLETED | OUTPATIENT
Start: 2025-08-27 | End: 2025-08-27

## 2025-08-27 RX ORDER — POLYETHYLENE GLYCOL 3350 17 G/17G
17 POWDER, FOR SOLUTION ORAL NIGHTLY
Status: DISCONTINUED | OUTPATIENT
Start: 2025-08-27 | End: 2025-08-29 | Stop reason: HOSPADM

## 2025-08-27 RX ORDER — HYDROXYCHLOROQUINE SULFATE 200 MG/1
200 TABLET, FILM COATED ORAL 2 TIMES DAILY
Status: DISCONTINUED | OUTPATIENT
Start: 2025-08-27 | End: 2025-08-29 | Stop reason: HOSPADM

## 2025-08-27 RX ORDER — CLINDAMYCIN PHOSPHATE 900 MG/50ML
900 INJECTION, SOLUTION INTRAVENOUS
Status: COMPLETED | OUTPATIENT
Start: 2025-08-27 | End: 2025-08-27

## 2025-08-27 RX ORDER — TRANEXAMIC ACID 650 MG/1
1950 TABLET ORAL
Status: COMPLETED | OUTPATIENT
Start: 2025-08-27 | End: 2025-08-27

## 2025-08-27 RX ORDER — GLYCOPYRROLATE 0.2 MG/ML
INJECTION INTRAMUSCULAR; INTRAVENOUS
Status: DISCONTINUED | OUTPATIENT
Start: 2025-08-27 | End: 2025-08-27

## 2025-08-27 RX ORDER — HYDROCODONE BITARTRATE AND ACETAMINOPHEN 5; 325 MG/1; MG/1
1 TABLET ORAL EVERY 4 HOURS PRN
Status: DISCONTINUED | OUTPATIENT
Start: 2025-08-27 | End: 2025-08-27

## 2025-08-27 RX ORDER — ONDANSETRON HYDROCHLORIDE 2 MG/ML
4 INJECTION, SOLUTION INTRAVENOUS EVERY 6 HOURS PRN
Status: DISCONTINUED | OUTPATIENT
Start: 2025-08-27 | End: 2025-08-29 | Stop reason: HOSPADM

## 2025-08-27 RX ORDER — ROPIVACAINE HYDROCHLORIDE 5 MG/ML
INJECTION, SOLUTION EPIDURAL; INFILTRATION; PERINEURAL
Status: DISCONTINUED | OUTPATIENT
Start: 2025-08-27 | End: 2025-08-27 | Stop reason: HOSPADM

## 2025-08-27 RX ORDER — CEFAZOLIN 2 G/1
2 INJECTION, POWDER, FOR SOLUTION INTRAMUSCULAR; INTRAVENOUS
Status: COMPLETED | OUTPATIENT
Start: 2025-08-27 | End: 2025-08-28

## 2025-08-27 RX ORDER — EPINEPHRINE 1 MG/ML
INJECTION, SOLUTION, CONCENTRATE INTRAVENOUS
Status: DISCONTINUED | OUTPATIENT
Start: 2025-08-27 | End: 2025-08-27 | Stop reason: HOSPADM

## 2025-08-27 RX ORDER — GLUCAGON 1 MG
1 KIT INJECTION
Status: DISCONTINUED | OUTPATIENT
Start: 2025-08-27 | End: 2025-08-27 | Stop reason: HOSPADM

## 2025-08-27 RX ORDER — PROPOFOL 10 MG/ML
VIAL (ML) INTRAVENOUS CONTINUOUS PRN
Status: DISCONTINUED | OUTPATIENT
Start: 2025-08-27 | End: 2025-08-27

## 2025-08-27 RX ORDER — NAPROXEN SODIUM 220 MG/1
81 TABLET, FILM COATED ORAL 2 TIMES DAILY
Status: DISCONTINUED | OUTPATIENT
Start: 2025-08-28 | End: 2025-08-28

## 2025-08-27 RX ORDER — DEXAMETHASONE SODIUM PHOSPHATE 4 MG/ML
INJECTION, SOLUTION INTRA-ARTICULAR; INTRALESIONAL; INTRAMUSCULAR; INTRAVENOUS; SOFT TISSUE
Status: DISCONTINUED | OUTPATIENT
Start: 2025-08-27 | End: 2025-08-27

## 2025-08-27 RX ORDER — BISACODYL 10 MG/1
10 SUPPOSITORY RECTAL DAILY
Status: DISCONTINUED | OUTPATIENT
Start: 2025-08-30 | End: 2025-08-29 | Stop reason: HOSPADM

## 2025-08-27 RX ORDER — HYDROCODONE BITARTRATE AND ACETAMINOPHEN 7.5; 325 MG/1; MG/1
1 TABLET ORAL EVERY 4 HOURS PRN
Refills: 0 | Status: DISCONTINUED | OUTPATIENT
Start: 2025-08-27 | End: 2025-08-28

## 2025-08-27 RX ORDER — KETOROLAC TROMETHAMINE 10 MG/1
10 TABLET, FILM COATED ORAL
Status: DISCONTINUED | OUTPATIENT
Start: 2025-08-27 | End: 2025-08-28

## 2025-08-27 RX ORDER — KETOROLAC TROMETHAMINE 30 MG/ML
INJECTION, SOLUTION INTRAMUSCULAR; INTRAVENOUS
Status: DISPENSED
Start: 2025-08-27 | End: 2025-08-27

## 2025-08-27 RX ORDER — BUPIVACAINE 13.3 MG/ML
INJECTION, SUSPENSION, LIPOSOMAL INFILTRATION
Status: DISPENSED
Start: 2025-08-27 | End: 2025-08-27

## 2025-08-27 RX ORDER — SCOPOLAMINE 1 MG/3D
1 PATCH, EXTENDED RELEASE TRANSDERMAL ONCE AS NEEDED
Status: DISCONTINUED | OUTPATIENT
Start: 2025-08-27 | End: 2025-08-27 | Stop reason: HOSPADM

## 2025-08-27 RX ORDER — HYDROCODONE BITARTRATE AND ACETAMINOPHEN 10; 325 MG/1; MG/1
1 TABLET ORAL EVERY 6 HOURS PRN
Refills: 0 | Status: DISCONTINUED | OUTPATIENT
Start: 2025-08-27 | End: 2025-08-28

## 2025-08-27 RX ORDER — FAMOTIDINE 20 MG/1
20 TABLET, FILM COATED ORAL
Status: DISCONTINUED | OUTPATIENT
Start: 2025-08-28 | End: 2025-08-29 | Stop reason: HOSPADM

## 2025-08-27 RX ORDER — SODIUM CHLORIDE 9 MG/ML
INJECTION, SOLUTION INTRAVENOUS CONTINUOUS
Status: ACTIVE | OUTPATIENT
Start: 2025-08-27 | End: 2025-08-28

## 2025-08-27 RX ORDER — ALUMINUM HYDROXIDE, MAGNESIUM HYDROXIDE, AND SIMETHICONE 1200; 120; 1200 MG/30ML; MG/30ML; MG/30ML
30 SUSPENSION ORAL EVERY 6 HOURS PRN
Status: DISCONTINUED | OUTPATIENT
Start: 2025-08-27 | End: 2025-08-29 | Stop reason: HOSPADM

## 2025-08-27 RX ADMIN — HYDROXYCHLOROQUINE SULFATE 200 MG: 200 TABLET ORAL at 08:08

## 2025-08-27 RX ADMIN — GABAPENTIN 300 MG: 300 CAPSULE ORAL at 08:08

## 2025-08-27 RX ADMIN — HYDROCODONE BITARTRATE AND ACETAMINOPHEN 1 TABLET: 5; 325 TABLET ORAL at 11:08

## 2025-08-27 RX ADMIN — CEFAZOLIN 2 G: 2 INJECTION, POWDER, FOR SOLUTION INTRAMUSCULAR; INTRAVENOUS at 03:08

## 2025-08-27 RX ADMIN — HYDROCODONE BITARTRATE AND ACETAMINOPHEN 1 TABLET: 7.5; 325 TABLET ORAL at 08:08

## 2025-08-27 RX ADMIN — CLINDAMYCIN PHOSPHATE 900 MG: 900 INJECTION, SOLUTION INTRAVENOUS at 08:08

## 2025-08-27 RX ADMIN — PROPOFOL 100 MCG/KG/MIN: 10 INJECTION, EMULSION INTRAVENOUS at 08:08

## 2025-08-27 RX ADMIN — SODIUM CHLORIDE: 9 INJECTION, SOLUTION INTRAVENOUS at 11:08

## 2025-08-27 RX ADMIN — MIDAZOLAM 1 MG: 1 INJECTION INTRAMUSCULAR; INTRAVENOUS at 08:08

## 2025-08-27 RX ADMIN — ACETAMINOPHEN 1000 MG: 10 INJECTION, SOLUTION INTRAVENOUS at 05:08

## 2025-08-27 RX ADMIN — GLYCOPYRROLATE 0.2 MG: 0.2 INJECTION INTRAMUSCULAR; INTRAVENOUS at 08:08

## 2025-08-27 RX ADMIN — ACETAMINOPHEN 1000 MG: 500 TABLET ORAL at 07:08

## 2025-08-27 RX ADMIN — HYDROCODONE BITARTRATE AND ACETAMINOPHEN 1 TABLET: 7.5; 325 TABLET ORAL at 03:08

## 2025-08-27 RX ADMIN — SENNOSIDES AND DOCUSATE SODIUM 2 TABLET: 50; 8.6 TABLET ORAL at 08:08

## 2025-08-27 RX ADMIN — DEXAMETHASONE SODIUM PHOSPHATE 4 MG: 4 INJECTION, SOLUTION INTRA-ARTICULAR; INTRALESIONAL; INTRAMUSCULAR; INTRAVENOUS; SOFT TISSUE at 09:08

## 2025-08-27 RX ADMIN — METOCLOPRAMIDE 10 MG: 5 INJECTION, SOLUTION INTRAMUSCULAR; INTRAVENOUS at 01:08

## 2025-08-27 RX ADMIN — LIDOCAINE HYDROCHLORIDE 50 MG: 20 INJECTION INTRAVENOUS at 08:08

## 2025-08-27 RX ADMIN — GABAPENTIN 300 MG: 300 CAPSULE ORAL at 07:08

## 2025-08-27 RX ADMIN — ONDANSETRON 4 MG: 4 TABLET, ORALLY DISINTEGRATING ORAL at 07:08

## 2025-08-27 RX ADMIN — CEFAZOLIN 2 MG: 330 INJECTION, POWDER, FOR SOLUTION INTRAMUSCULAR; INTRAVENOUS at 09:08

## 2025-08-27 RX ADMIN — KETOROLAC TROMETHAMINE 30 MG: 30 INJECTION, SOLUTION INTRAMUSCULAR; INTRAVENOUS at 01:08

## 2025-08-27 RX ADMIN — TRANEXAMIC ACID 1950 MG: 650 TABLET ORAL at 07:08

## 2025-08-27 RX ADMIN — PHENYLEPHRINE HYDROCHLORIDE 25 MCG/MIN: 10 INJECTION INTRAVENOUS at 09:08

## 2025-08-27 RX ADMIN — BUPIVACAINE HYDROCHLORIDE 1.7 ML: 7.5 INJECTION, SOLUTION EPIDURAL; RETROBULBAR at 08:08

## 2025-08-27 RX ADMIN — CEFAZOLIN 2 G: 2 INJECTION, POWDER, FOR SOLUTION INTRAMUSCULAR; INTRAVENOUS at 09:08

## 2025-08-27 RX ADMIN — SODIUM CHLORIDE, SODIUM GLUCONATE, SODIUM ACETATE, POTASSIUM CHLORIDE AND MAGNESIUM CHLORIDE: 526; 502; 368; 37; 30 INJECTION, SOLUTION INTRAVENOUS at 06:08

## 2025-08-27 RX ADMIN — METHOCARBAMOL 750 MG: 750 TABLET ORAL at 01:08

## 2025-08-27 RX ADMIN — METHOCARBAMOL 750 MG: 750 TABLET ORAL at 09:08

## 2025-08-27 RX ADMIN — METOCLOPRAMIDE 10 MG: 5 INJECTION, SOLUTION INTRAMUSCULAR; INTRAVENOUS at 08:08

## 2025-08-27 RX ADMIN — POLYETHYLENE GLYCOL 3350 17 G: 17 POWDER, FOR SOLUTION ORAL at 08:08

## 2025-08-27 RX ADMIN — KETOROLAC TROMETHAMINE 10 MG: 10 TABLET, FILM COATED ORAL at 08:08

## 2025-08-28 LAB
ANION GAP SERPL CALC-SCNC: 8 MEQ/L
BUN SERPL-MCNC: 10.3 MG/DL (ref 9.8–20.1)
CALCIUM SERPL-MCNC: 8.6 MG/DL (ref 8.4–10.2)
CHLORIDE SERPL-SCNC: 109 MMOL/L (ref 98–107)
CO2 SERPL-SCNC: 21 MMOL/L (ref 23–31)
CREAT SERPL-MCNC: 0.62 MG/DL (ref 0.55–1.02)
CREAT/UREA NIT SERPL: 17
ERYTHROCYTE [DISTWIDTH] IN BLOOD BY AUTOMATED COUNT: 13.3 % (ref 11.5–17)
GFR SERPLBLD CREATININE-BSD FMLA CKD-EPI: >60 ML/MIN/1.73/M2
GLUCOSE SERPL-MCNC: 162 MG/DL (ref 82–115)
HCT VFR BLD AUTO: 35.7 % (ref 37–47)
HGB BLD-MCNC: 11.4 G/DL (ref 12–16)
MCH RBC QN AUTO: 29.2 PG (ref 27–31)
MCHC RBC AUTO-ENTMCNC: 31.9 G/DL (ref 33–36)
MCV RBC AUTO: 91.5 FL (ref 80–94)
NRBC BLD AUTO-RTO: 0 %
PLATELET # BLD AUTO: 227 X10(3)/MCL (ref 130–400)
PMV BLD AUTO: 10.1 FL (ref 7.4–10.4)
POTASSIUM SERPL-SCNC: 3 MMOL/L (ref 3.5–5.1)
RBC # BLD AUTO: 3.9 X10(6)/MCL (ref 4.2–5.4)
SODIUM SERPL-SCNC: 138 MMOL/L (ref 136–145)
WBC # BLD AUTO: 15.82 X10(3)/MCL (ref 4.5–11.5)

## 2025-08-28 PROCEDURE — 97110 THERAPEUTIC EXERCISES: CPT | Mod: CQ

## 2025-08-28 PROCEDURE — 99900031 HC PATIENT EDUCATION (STAT)

## 2025-08-28 PROCEDURE — 25000003 PHARM REV CODE 250: Performed by: ORTHOPAEDIC SURGERY

## 2025-08-28 PROCEDURE — 94799 UNLISTED PULMONARY SVC/PX: CPT | Mod: XB

## 2025-08-28 PROCEDURE — 97116 GAIT TRAINING THERAPY: CPT

## 2025-08-28 PROCEDURE — G0378 HOSPITAL OBSERVATION PER HR: HCPCS

## 2025-08-28 PROCEDURE — 94761 N-INVAS EAR/PLS OXIMETRY MLT: CPT

## 2025-08-28 PROCEDURE — 36415 COLL VENOUS BLD VENIPUNCTURE: CPT | Performed by: ORTHOPAEDIC SURGERY

## 2025-08-28 PROCEDURE — 97530 THERAPEUTIC ACTIVITIES: CPT

## 2025-08-28 PROCEDURE — 85027 COMPLETE CBC AUTOMATED: CPT | Performed by: ORTHOPAEDIC SURGERY

## 2025-08-28 PROCEDURE — 63600175 PHARM REV CODE 636 W HCPCS: Mod: JZ,TB | Performed by: NURSE PRACTITIONER

## 2025-08-28 PROCEDURE — 63600175 PHARM REV CODE 636 W HCPCS: Performed by: ORTHOPAEDIC SURGERY

## 2025-08-28 PROCEDURE — 25000003 PHARM REV CODE 250: Performed by: NURSE PRACTITIONER

## 2025-08-28 PROCEDURE — 80048 BASIC METABOLIC PNL TOTAL CA: CPT | Performed by: ORTHOPAEDIC SURGERY

## 2025-08-28 RX ORDER — POTASSIUM CHLORIDE 20 MEQ/1
40 TABLET, EXTENDED RELEASE ORAL ONCE
Status: COMPLETED | OUTPATIENT
Start: 2025-08-28 | End: 2025-08-28

## 2025-08-28 RX ORDER — METHOCARBAMOL 750 MG/1
750 TABLET, FILM COATED ORAL EVERY 6 HOURS PRN
Qty: 56 TABLET | Refills: 0 | Status: SHIPPED | OUTPATIENT
Start: 2025-08-28 | End: 2025-09-11

## 2025-08-28 RX ORDER — HYDROCODONE BITARTRATE AND ACETAMINOPHEN 5; 325 MG/1; MG/1
1 TABLET ORAL EVERY 4 HOURS PRN
Refills: 0 | Status: DISCONTINUED | OUTPATIENT
Start: 2025-08-28 | End: 2025-08-29 | Stop reason: HOSPADM

## 2025-08-28 RX ORDER — HYDROCODONE BITARTRATE AND ACETAMINOPHEN 7.5; 325 MG/1; MG/1
1 TABLET ORAL EVERY 4 HOURS PRN
Qty: 42 TABLET | Refills: 0 | Status: SHIPPED | OUTPATIENT
Start: 2025-08-28 | End: 2025-09-04

## 2025-08-28 RX ORDER — ASPIRIN 81 MG/1
81 TABLET ORAL DAILY
Start: 2025-08-28

## 2025-08-28 RX ORDER — HYDROCODONE BITARTRATE AND ACETAMINOPHEN 7.5; 325 MG/1; MG/1
1 TABLET ORAL EVERY 4 HOURS PRN
Refills: 0 | Status: DISCONTINUED | OUTPATIENT
Start: 2025-08-28 | End: 2025-08-29 | Stop reason: HOSPADM

## 2025-08-28 RX ORDER — ASPIRIN 81 MG/1
81 TABLET ORAL DAILY
Status: DISCONTINUED | OUTPATIENT
Start: 2025-08-28 | End: 2025-08-28

## 2025-08-28 RX ORDER — KETOROLAC TROMETHAMINE 10 MG/1
10 TABLET, FILM COATED ORAL EVERY 8 HOURS
Status: DISCONTINUED | OUTPATIENT
Start: 2025-08-28 | End: 2025-08-29 | Stop reason: HOSPADM

## 2025-08-28 RX ORDER — ASPIRIN 81 MG/1
81 TABLET ORAL 2 TIMES DAILY
Status: DISCONTINUED | OUTPATIENT
Start: 2025-08-28 | End: 2025-08-29 | Stop reason: HOSPADM

## 2025-08-28 RX ORDER — KETOROLAC TROMETHAMINE 30 MG/ML
30 INJECTION, SOLUTION INTRAMUSCULAR; INTRAVENOUS ONCE
Status: COMPLETED | OUTPATIENT
Start: 2025-08-28 | End: 2025-08-28

## 2025-08-28 RX ORDER — DOXYCYCLINE 100 MG/1
100 CAPSULE ORAL EVERY 12 HOURS
Qty: 28 CAPSULE | Refills: 0 | Status: SHIPPED | OUTPATIENT
Start: 2025-08-28 | End: 2025-09-11

## 2025-08-28 RX ORDER — AMOXICILLIN 250 MG
2 CAPSULE ORAL 2 TIMES DAILY
Qty: 56 TABLET | Refills: 0 | Status: SHIPPED | OUTPATIENT
Start: 2025-08-28 | End: 2025-09-11

## 2025-08-28 RX ADMIN — HYDROCODONE BITARTRATE AND ACETAMINOPHEN 1 TABLET: 7.5; 325 TABLET ORAL at 09:08

## 2025-08-28 RX ADMIN — POLYETHYLENE GLYCOL 3350 17 G: 17 POWDER, FOR SOLUTION ORAL at 08:08

## 2025-08-28 RX ADMIN — HYDROCODONE BITARTRATE AND ACETAMINOPHEN 1 TABLET: 7.5; 325 TABLET ORAL at 12:08

## 2025-08-28 RX ADMIN — KETOROLAC TROMETHAMINE 10 MG: 10 TABLET, FILM COATED ORAL at 09:08

## 2025-08-28 RX ADMIN — HYDROCODONE BITARTRATE AND ACETAMINOPHEN 1 TABLET: 5; 325 TABLET ORAL at 08:08

## 2025-08-28 RX ADMIN — METHOCARBAMOL 750 MG: 750 TABLET ORAL at 10:08

## 2025-08-28 RX ADMIN — HYDROCODONE BITARTRATE AND ACETAMINOPHEN 1 TABLET: 7.5; 325 TABLET ORAL at 05:08

## 2025-08-28 RX ADMIN — METHOCARBAMOL 750 MG: 750 TABLET ORAL at 03:08

## 2025-08-28 RX ADMIN — KETOROLAC TROMETHAMINE 30 MG: 30 INJECTION, SOLUTION INTRAMUSCULAR; INTRAVENOUS at 03:08

## 2025-08-28 RX ADMIN — AMLODIPINE BESYLATE 5 MG: 5 TABLET ORAL at 12:08

## 2025-08-28 RX ADMIN — HYDROCODONE BITARTRATE AND ACETAMINOPHEN 1 TABLET: 5; 325 TABLET ORAL at 05:08

## 2025-08-28 RX ADMIN — METOCLOPRAMIDE 10 MG: 5 INJECTION, SOLUTION INTRAMUSCULAR; INTRAVENOUS at 03:08

## 2025-08-28 RX ADMIN — POTASSIUM CHLORIDE 40 MEQ: 20 TABLET, EXTENDED RELEASE ORAL at 09:08

## 2025-08-28 RX ADMIN — DOCUSATE SODIUM 200 MG: 100 CAPSULE, LIQUID FILLED ORAL at 05:08

## 2025-08-28 RX ADMIN — HYDROCODONE BITARTRATE AND ACETAMINOPHEN 1 TABLET: 5; 325 TABLET ORAL at 12:08

## 2025-08-28 RX ADMIN — KETOROLAC TROMETHAMINE 10 MG: 10 TABLET, FILM COATED ORAL at 01:08

## 2025-08-28 RX ADMIN — FAMOTIDINE 20 MG: 20 TABLET, FILM COATED ORAL at 05:08

## 2025-08-28 RX ADMIN — SODIUM CHLORIDE 250 ML: 9 INJECTION, SOLUTION INTRAVENOUS at 09:08

## 2025-08-28 RX ADMIN — METOCLOPRAMIDE 10 MG: 5 INJECTION, SOLUTION INTRAMUSCULAR; INTRAVENOUS at 09:08

## 2025-08-28 RX ADMIN — SENNOSIDES AND DOCUSATE SODIUM 2 TABLET: 50; 8.6 TABLET ORAL at 08:08

## 2025-08-28 RX ADMIN — CEFAZOLIN 2 G: 2 INJECTION, POWDER, FOR SOLUTION INTRAMUSCULAR; INTRAVENOUS at 05:08

## 2025-08-28 RX ADMIN — METOCLOPRAMIDE 10 MG: 5 INJECTION, SOLUTION INTRAMUSCULAR; INTRAVENOUS at 01:08

## 2025-08-28 RX ADMIN — HYDROXYCHLOROQUINE SULFATE 200 MG: 200 TABLET ORAL at 08:08

## 2025-08-28 RX ADMIN — HYDROXYCHLOROQUINE SULFATE 200 MG: 200 TABLET ORAL at 09:08

## 2025-08-28 RX ADMIN — SENNOSIDES AND DOCUSATE SODIUM 2 TABLET: 50; 8.6 TABLET ORAL at 09:08

## 2025-08-28 RX ADMIN — ASPIRIN 81 MG: 81 TABLET, CHEWABLE ORAL at 09:08

## 2025-08-28 RX ADMIN — METOCLOPRAMIDE 10 MG: 5 INJECTION, SOLUTION INTRAMUSCULAR; INTRAVENOUS at 08:08

## 2025-08-28 RX ADMIN — ASPIRIN 81 MG: 81 TABLET, DELAYED RELEASE ORAL at 08:08

## 2025-08-29 VITALS
DIASTOLIC BLOOD PRESSURE: 70 MMHG | SYSTOLIC BLOOD PRESSURE: 125 MMHG | RESPIRATION RATE: 18 BRPM | BODY MASS INDEX: 35.35 KG/M2 | TEMPERATURE: 98 F | OXYGEN SATURATION: 96 % | HEART RATE: 81 BPM | HEIGHT: 63 IN | WEIGHT: 199.5 LBS

## 2025-08-29 LAB
ANION GAP SERPL CALC-SCNC: 8 MEQ/L
BUN SERPL-MCNC: 10.5 MG/DL (ref 9.8–20.1)
CALCIUM SERPL-MCNC: 8.8 MG/DL (ref 8.4–10.2)
CHLORIDE SERPL-SCNC: 107 MMOL/L (ref 98–107)
CO2 SERPL-SCNC: 22 MMOL/L (ref 23–31)
CREAT SERPL-MCNC: 0.58 MG/DL (ref 0.55–1.02)
CREAT/UREA NIT SERPL: 18
ERYTHROCYTE [DISTWIDTH] IN BLOOD BY AUTOMATED COUNT: 13.6 % (ref 11.5–17)
GFR SERPLBLD CREATININE-BSD FMLA CKD-EPI: >60 ML/MIN/1.73/M2
GLUCOSE SERPL-MCNC: 105 MG/DL (ref 82–115)
HCT VFR BLD AUTO: 34.5 % (ref 37–47)
HGB BLD-MCNC: 11.4 G/DL (ref 12–16)
MCH RBC QN AUTO: 30.2 PG (ref 27–31)
MCHC RBC AUTO-ENTMCNC: 33 G/DL (ref 33–36)
MCV RBC AUTO: 91.3 FL (ref 80–94)
NRBC BLD AUTO-RTO: 0 %
PLATELET # BLD AUTO: 185 X10(3)/MCL (ref 130–400)
PMV BLD AUTO: 10.8 FL (ref 7.4–10.4)
POTASSIUM SERPL-SCNC: 3.7 MMOL/L (ref 3.5–5.1)
RBC # BLD AUTO: 3.78 X10(6)/MCL (ref 4.2–5.4)
SODIUM SERPL-SCNC: 137 MMOL/L (ref 136–145)
WBC # BLD AUTO: 21.3 X10(3)/MCL (ref 4.5–11.5)

## 2025-08-29 PROCEDURE — 97535 SELF CARE MNGMENT TRAINING: CPT

## 2025-08-29 PROCEDURE — 85027 COMPLETE CBC AUTOMATED: CPT | Performed by: ORTHOPAEDIC SURGERY

## 2025-08-29 PROCEDURE — 97530 THERAPEUTIC ACTIVITIES: CPT

## 2025-08-29 PROCEDURE — 97116 GAIT TRAINING THERAPY: CPT

## 2025-08-29 PROCEDURE — 80048 BASIC METABOLIC PNL TOTAL CA: CPT | Performed by: ORTHOPAEDIC SURGERY

## 2025-08-29 PROCEDURE — 97166 OT EVAL MOD COMPLEX 45 MIN: CPT

## 2025-08-29 PROCEDURE — 25000003 PHARM REV CODE 250: Performed by: ORTHOPAEDIC SURGERY

## 2025-08-29 PROCEDURE — 36415 COLL VENOUS BLD VENIPUNCTURE: CPT | Performed by: ORTHOPAEDIC SURGERY

## 2025-08-29 PROCEDURE — 97110 THERAPEUTIC EXERCISES: CPT

## 2025-08-29 PROCEDURE — G0378 HOSPITAL OBSERVATION PER HR: HCPCS

## 2025-08-29 PROCEDURE — 25000003 PHARM REV CODE 250: Performed by: NURSE PRACTITIONER

## 2025-08-29 RX ADMIN — HYDROCODONE BITARTRATE AND ACETAMINOPHEN 1 TABLET: 5; 325 TABLET ORAL at 01:08

## 2025-08-29 RX ADMIN — ASPIRIN 81 MG: 81 TABLET, DELAYED RELEASE ORAL at 08:08

## 2025-08-29 RX ADMIN — FAMOTIDINE 20 MG: 20 TABLET, FILM COATED ORAL at 05:08

## 2025-08-29 RX ADMIN — HYDROCODONE BITARTRATE AND ACETAMINOPHEN 1 TABLET: 5; 325 TABLET ORAL at 09:08

## 2025-08-29 RX ADMIN — HYDROCODONE BITARTRATE AND ACETAMINOPHEN 1 TABLET: 5; 325 TABLET ORAL at 05:08

## 2025-08-29 RX ADMIN — KETOROLAC TROMETHAMINE 10 MG: 10 TABLET, FILM COATED ORAL at 01:08

## 2025-08-29 RX ADMIN — KETOROLAC TROMETHAMINE 10 MG: 10 TABLET, FILM COATED ORAL at 05:08

## 2025-08-29 RX ADMIN — HYDROXYCHLOROQUINE SULFATE 200 MG: 200 TABLET ORAL at 08:08

## 2025-08-29 RX ADMIN — DOCUSATE SODIUM 200 MG: 100 CAPSULE, LIQUID FILLED ORAL at 05:08

## 2025-08-29 RX ADMIN — SENNOSIDES AND DOCUSATE SODIUM 2 TABLET: 50; 8.6 TABLET ORAL at 08:08

## 2025-08-29 RX ADMIN — METHOCARBAMOL 750 MG: 750 TABLET ORAL at 04:08

## 2025-08-29 RX ADMIN — AMLODIPINE BESYLATE 5 MG: 5 TABLET ORAL at 08:08

## 2025-08-29 RX ADMIN — METHOCARBAMOL 750 MG: 750 TABLET ORAL at 12:08

## 2025-09-02 ENCOUNTER — PATIENT OUTREACH (OUTPATIENT)
Dept: ADMINISTRATIVE | Facility: CLINIC | Age: 70
End: 2025-09-02
Payer: MEDICARE

## (undated) DEVICE — SUT MCRYL PLUS 2-0 CT-1 36IN

## (undated) DEVICE — BLADE SAG DUAL CUT 18X90X1.35

## (undated) DEVICE — GLOVE 8.5 PROTEXIS PI BLUE

## (undated) DEVICE — PAD ABDOMINAL STERILE 8X10IN

## (undated) DEVICE — DRAPE MEDIUM SHEET 40X70IN

## (undated) DEVICE — CONTAINER SPECIMEN SCREW 4OZ

## (undated) DEVICE — KIT CHECKPOINT MAKO

## (undated) DEVICE — APPLICATOR CHLORAPREP ORN 26ML

## (undated) DEVICE — PIN BONE 3.2X110MM
Type: IMPLANTABLE DEVICE | Site: KNEE | Status: NON-FUNCTIONAL
Removed: 2025-08-27

## (undated) DEVICE — CUSHION  WC FOAM 20X20X.75IN

## (undated) DEVICE — KIT VIZADISC KNEE TRACKING

## (undated) DEVICE — DRAPE FULL SHEET 70X100IN

## (undated) DEVICE — Device

## (undated) DEVICE — GLOVE PROTEXIS HYDROGEL SZ8.5

## (undated) DEVICE — SOL NACL IRR 1000ML BTL

## (undated) DEVICE — GLOVE 6.5 PROTEXIS PI BLUE

## (undated) DEVICE — COVER TABLE HVY DTY 60X90IN

## (undated) DEVICE — CUFF ATS 2 PORT SNGL BLDR 34IN

## (undated) DEVICE — TAPE SILK 3IN

## (undated) DEVICE — DRAPE STERI U-SHAPED 47X51IN

## (undated) DEVICE — KIT SURGICAL TURNOVER

## (undated) DEVICE — KIT DRAPE RIO ONE PIECE W/POCK

## (undated) DEVICE — PIN FIXATION BONE 140X3.2MM
Type: IMPLANTABLE DEVICE | Site: KNEE | Status: NON-FUNCTIONAL
Removed: 2025-08-27

## (undated) DEVICE — CORD SILICONE RETRACTOR

## (undated) DEVICE — BLADE MAKO STANDARD

## (undated) DEVICE — KIT TRIATHLON CR TIB PREP SZ3

## (undated) DEVICE — SUT VICRYL BR 1 GEN 27 CT-1

## (undated) DEVICE — SUT MONOCRYL 3-0 PS-2 UND

## (undated) DEVICE — SOL NORMAL USPCA 0.9%

## (undated) DEVICE — KIT TRIATHLON CR FEM PREP SZ3

## (undated) DEVICE — WRAP DEMAYO LEG STERILE

## (undated) DEVICE — ELECTRODE PATIENT RETURN DISP

## (undated) DEVICE — SOL POVIDONE IODINE PCH 3/4OZ

## (undated) DEVICE — DRESSING XEROFORM NONADH 1X8IN

## (undated) DEVICE — PADDING WYTEX UNDRCST 6INX4YD

## (undated) DEVICE — GOWN ECLIPSE REINF L4 XLNG XXL

## (undated) DEVICE — SYR 10CC LUER LOCK

## (undated) DEVICE — SPONGE COTTON TRAY 4X4IN

## (undated) DEVICE — GLOVE PROTEXIS PI SYN SURG 6.5

## (undated) DEVICE — DEVICE STRATAFIX SYMMETRIC +